# Patient Record
Sex: FEMALE | Race: WHITE | Employment: FULL TIME | URBAN - METROPOLITAN AREA
[De-identification: names, ages, dates, MRNs, and addresses within clinical notes are randomized per-mention and may not be internally consistent; named-entity substitution may affect disease eponyms.]

---

## 2018-09-21 ENCOUNTER — HOSPITAL ENCOUNTER (EMERGENCY)
Age: 48
Discharge: HOME | End: 2018-09-21
Attending: EMERGENCY MEDICINE
Payer: COMMERCIAL

## 2018-09-21 ENCOUNTER — APPOINTMENT (OUTPATIENT)
Dept: GENERAL RADIOLOGY | Age: 48
End: 2018-09-21
Payer: COMMERCIAL

## 2018-09-21 VITALS
RESPIRATION RATE: 12 BRPM | SYSTOLIC BLOOD PRESSURE: 136 MMHG | HEART RATE: 95 BPM | OXYGEN SATURATION: 100 % | HEIGHT: 62 IN | WEIGHT: 119.4 LBS | TEMPERATURE: 98.1 F | DIASTOLIC BLOOD PRESSURE: 88 MMHG | BODY MASS INDEX: 21.97 KG/M2

## 2018-09-21 DIAGNOSIS — N63.0 BREAST LUMP IN FEMALE: ICD-10-CM

## 2018-09-21 DIAGNOSIS — J20.9 ACUTE BRONCHITIS, UNSPECIFIED ORGANISM: Primary | ICD-10-CM

## 2018-09-21 PROCEDURE — 94640 AIRWAY INHALATION TREATMENT: CPT

## 2018-09-21 PROCEDURE — 71046 X-RAY EXAM CHEST 2 VIEWS: CPT

## 2018-09-21 PROCEDURE — 94664 DEMO&/EVAL PT USE INHALER: CPT

## 2018-09-21 PROCEDURE — 94761 N-INVAS EAR/PLS OXIMETRY MLT: CPT

## 2018-09-21 PROCEDURE — 99283 EMERGENCY DEPT VISIT LOW MDM: CPT

## 2018-09-21 PROCEDURE — 6370000000 HC RX 637 (ALT 250 FOR IP): Performed by: EMERGENCY MEDICINE

## 2018-09-21 RX ORDER — IPRATROPIUM BROMIDE AND ALBUTEROL SULFATE 2.5; .5 MG/3ML; MG/3ML
1 SOLUTION RESPIRATORY (INHALATION) ONCE
Status: COMPLETED | OUTPATIENT
Start: 2018-09-21 | End: 2018-09-21

## 2018-09-21 RX ORDER — AZITHROMYCIN 250 MG/1
TABLET, FILM COATED ORAL
Qty: 1 PACKET | Refills: 0 | Status: SHIPPED | OUTPATIENT
Start: 2018-09-21 | End: 2019-01-01 | Stop reason: ALTCHOICE

## 2018-09-21 RX ORDER — BENZONATATE 100 MG/1
100 CAPSULE ORAL 3 TIMES DAILY PRN
Qty: 30 CAPSULE | Refills: 0 | Status: SHIPPED | OUTPATIENT
Start: 2018-09-21 | End: 2018-09-28

## 2018-09-21 RX ORDER — PREDNISONE 20 MG/1
60 TABLET ORAL ONCE
Status: COMPLETED | OUTPATIENT
Start: 2018-09-21 | End: 2018-09-21

## 2018-09-21 RX ORDER — IPRATROPIUM BROMIDE AND ALBUTEROL SULFATE 2.5; .5 MG/3ML; MG/3ML
SOLUTION RESPIRATORY (INHALATION)
Status: DISCONTINUED
Start: 2018-09-21 | End: 2018-09-22 | Stop reason: HOSPADM

## 2018-09-21 RX ORDER — ALBUTEROL SULFATE 90 UG/1
2 AEROSOL, METERED RESPIRATORY (INHALATION) EVERY 4 HOURS PRN
Qty: 1 INHALER | Refills: 0 | Status: SHIPPED | OUTPATIENT
Start: 2018-09-21 | End: 2019-04-15

## 2018-09-21 RX ORDER — PREDNISONE 20 MG/1
60 TABLET ORAL DAILY
Qty: 12 TABLET | Refills: 0 | Status: SHIPPED | OUTPATIENT
Start: 2018-09-21 | End: 2019-01-01 | Stop reason: ALTCHOICE

## 2018-09-21 RX ADMIN — PREDNISONE 60 MG: 20 TABLET ORAL at 22:30

## 2018-09-21 RX ADMIN — IPRATROPIUM BROMIDE AND ALBUTEROL SULFATE 1 AMPULE: .5; 3 SOLUTION RESPIRATORY (INHALATION) at 22:12

## 2018-09-21 ASSESSMENT — PAIN SCALES - GENERAL: PAINLEVEL_OUTOF10: 3

## 2018-09-21 ASSESSMENT — PAIN DESCRIPTION - PAIN TYPE: TYPE: ACUTE PAIN

## 2018-09-21 ASSESSMENT — PAIN DESCRIPTION - ONSET: ONSET: ON-GOING

## 2018-09-21 ASSESSMENT — PAIN DESCRIPTION - LOCATION: LOCATION: CHEST

## 2018-09-21 ASSESSMENT — PAIN DESCRIPTION - FREQUENCY: FREQUENCY: INTERMITTENT

## 2018-09-21 ASSESSMENT — PAIN DESCRIPTION - DESCRIPTORS: DESCRIPTORS: BURNING;PRESSURE

## 2018-09-21 ASSESSMENT — PAIN DESCRIPTION - ORIENTATION: ORIENTATION: MID

## 2018-09-21 ASSESSMENT — PAIN DESCRIPTION - PROGRESSION: CLINICAL_PROGRESSION: NOT CHANGED

## 2018-09-22 NOTE — ED PROVIDER NOTES
[facility] Emergency Department    CHIEF COMPLAINT  Cough (pt states she started with what was her allergies, then it turned to a cough with sinus drainage of yellow cough, eyes are itching.)      HISTORY OF PRESENT ILLNESS  Francesco Reza is a 53 y.o. female presenting to the ER with predominant complaint of cough for the past 2 days. She says the cough is occasionally productive of yellow sputum. Patient feels slightly short of breath with coughing. Patient is a smoker. She also has some concerns about a lump on her right breast.  The lump is painful to touch No other complaints, modifying factors or associated symptoms. I have reviewed the following from the nursing documentation. Past Medical History:   Diagnosis Date    Hepatitis C without hepatic coma     Hx of blood clots     DEEP VEIN THROMBOSIS FROM IV DRUG USE    IV drug abuse     None for 5 mos    Methadone use [facility])      Past Surgical History:   Procedure Laterality Date    BREAST BIOPSY Right 2015    Excisional     SECTION      TUBAL LIGATION       History reviewed. No pertinent family history. Social History     Social History    Marital status: Single     Spouse name: N/A    Number of children: N/A    Years of education: N/A     Occupational History    Not on file. Social History Main Topics    Smoking status: Current Every Day Smoker     Packs/day: 1.00     Years: 20.00     Types: Cigarettes    Smokeless tobacco: Never Used    Alcohol use No    Drug use: No      Comment: pt states clean x 3 yrs.  Sexual activity: Not on file     Other Topics Concern    Not on file     Social History Narrative    No narrative on file     No current facility-administered medications for this encounter.       Current Outpatient Prescriptions   Medication Sig Dispense Refill    benzonatate (TESSALON PERLES) 100 MG capsule Take 1 capsule by mouth 3 times daily as needed for Cough 30 capsule 0    azithromycin

## 2018-09-22 NOTE — ED NOTES
Patient states she is worried about this lump she has noticed over the last few weeks in right axillary/breast region. Patient has a hx of benign tissue being removed in the past from the right breast. Will continue to monitor.       Neelima Graff RN  09/21/18 3536

## 2018-09-22 NOTE — ED TRIAGE NOTES
pt states she started with what was her allergies, then it turned to a cough with sinus drainage of yellow cough, eyes are itching.

## 2018-11-01 ENCOUNTER — APPOINTMENT (OUTPATIENT)
Dept: GENERAL RADIOLOGY | Age: 48
End: 2018-11-01
Payer: COMMERCIAL

## 2018-11-01 ENCOUNTER — HOSPITAL ENCOUNTER (EMERGENCY)
Age: 48
Discharge: HOME | End: 2018-11-01
Attending: EMERGENCY MEDICINE
Payer: COMMERCIAL

## 2018-11-01 ENCOUNTER — APPOINTMENT (OUTPATIENT)
Dept: CT IMAGING | Age: 48
End: 2018-11-01
Payer: COMMERCIAL

## 2018-11-01 VITALS
WEIGHT: 125.2 LBS | DIASTOLIC BLOOD PRESSURE: 100 MMHG | HEIGHT: 62 IN | HEART RATE: 76 BPM | OXYGEN SATURATION: 99 % | TEMPERATURE: 97.9 F | SYSTOLIC BLOOD PRESSURE: 135 MMHG | RESPIRATION RATE: 17 BRPM | BODY MASS INDEX: 23.04 KG/M2

## 2018-11-01 DIAGNOSIS — S20.219A CONTUSION OF CHEST WALL, UNSPECIFIED LATERALITY, INITIAL ENCOUNTER: ICD-10-CM
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
## 2018-11-01 DIAGNOSIS — S46.812A TRAPEZIUS STRAIN, LEFT, INITIAL ENCOUNTER: ICD-10-CM

## 2018-11-01 DIAGNOSIS — M54.2 NECK PAIN: ICD-10-CM

## 2018-11-01 PROCEDURE — 6370000000 HC RX 637 (ALT 250 FOR IP): Performed by: EMERGENCY MEDICINE

## 2018-11-01 PROCEDURE — 72125 CT NECK SPINE W/O DYE: CPT

## 2018-11-01 PROCEDURE — 99284 EMERGENCY DEPT VISIT MOD MDM: CPT

## 2018-11-01 PROCEDURE — 71046 X-RAY EXAM CHEST 2 VIEWS: CPT

## 2018-11-01 RX ORDER — IBUPROFEN 400 MG/1
800 TABLET ORAL ONCE
Status: COMPLETED | OUTPATIENT
Start: 2018-11-01 | End: 2018-11-01

## 2018-11-01 RX ADMIN — IBUPROFEN 800 MG: 400 TABLET, FILM COATED ORAL at 19:01

## 2018-11-01 ASSESSMENT — PAIN DESCRIPTION - LOCATION: LOCATION: BACK;NECK;CHEST

## 2018-11-01 ASSESSMENT — PAIN SCALES - GENERAL
PAINLEVEL_OUTOF10: 6
PAINLEVEL_OUTOF10: 5
PAINLEVEL_OUTOF10: 6

## 2018-11-01 ASSESSMENT — PAIN DESCRIPTION - DESCRIPTORS: DESCRIPTORS: SHARP

## 2018-11-01 ASSESSMENT — PAIN DESCRIPTION - FREQUENCY: FREQUENCY: CONTINUOUS

## 2018-11-01 ASSESSMENT — PAIN DESCRIPTION - PAIN TYPE: TYPE: ACUTE PAIN
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
## 2019-01-01 ENCOUNTER — APPOINTMENT (OUTPATIENT)
Dept: CT IMAGING | Age: 49
End: 2019-01-01
Payer: COMMERCIAL

## 2019-01-01 ENCOUNTER — APPOINTMENT (OUTPATIENT)
Dept: GENERAL RADIOLOGY | Age: 49
End: 2019-01-01
Payer: COMMERCIAL

## 2019-01-01 ENCOUNTER — HOSPITAL ENCOUNTER (EMERGENCY)
Age: 49
Discharge: HOME | End: 2019-01-02
Attending: EMERGENCY MEDICINE
Payer: COMMERCIAL

## 2019-01-01 DIAGNOSIS — R10.32 LEFT LOWER QUADRANT PAIN: Primary | ICD-10-CM

## 2019-01-01 DIAGNOSIS — N83.202 CYST OF LEFT OVARY: ICD-10-CM

## 2019-01-01 LAB
BILIRUBIN URINE: NEGATIVE
BLOOD, URINE: NEGATIVE
CLARITY: CLEAR
COLOR: YELLOW
GLUCOSE URINE: NEGATIVE MG/DL
HCG(URINE) PREGNANCY TEST: NEGATIVE
KETONES, URINE: 15 MG/DL
LEUKOCYTE ESTERASE, URINE: NEGATIVE
MICROSCOPIC EXAMINATION: ABNORMAL
NITRITE, URINE: NEGATIVE
PH UA: 5.5
PROTEIN UA: NEGATIVE MG/DL
SPECIFIC GRAVITY UA: >=1.03
URINE REFLEX TO CULTURE: ABNORMAL
URINE TYPE: ABNORMAL
UROBILINOGEN, URINE: 0.2 E.U./DL

## 2019-01-01 PROCEDURE — 99284 EMERGENCY DEPT VISIT MOD MDM: CPT

## 2019-01-01 PROCEDURE — 96372 THER/PROPH/DIAG INJ SC/IM: CPT

## 2019-01-01 PROCEDURE — 74176 CT ABD & PELVIS W/O CONTRAST: CPT

## 2019-01-01 PROCEDURE — 84703 CHORIONIC GONADOTROPIN ASSAY: CPT

## 2019-01-01 PROCEDURE — 71046 X-RAY EXAM CHEST 2 VIEWS: CPT

## 2019-01-01 PROCEDURE — 81003 URINALYSIS AUTO W/O SCOPE: CPT

## 2019-01-01 PROCEDURE — 6360000002 HC RX W HCPCS: Performed by: EMERGENCY MEDICINE

## 2019-01-01 RX ORDER — KETOROLAC TROMETHAMINE 30 MG/ML
30 INJECTION, SOLUTION INTRAMUSCULAR; INTRAVENOUS ONCE
Status: COMPLETED | OUTPATIENT
Start: 2019-01-01 | End: 2019-01-01

## 2019-01-01 RX ORDER — NAPROXEN 500 MG/1
500 TABLET ORAL 2 TIMES DAILY PRN
Qty: 20 TABLET | Refills: 0 | Status: SHIPPED | OUTPATIENT
Start: 2019-01-01 | End: 2019-01-11

## 2019-01-01 RX ADMIN — KETOROLAC TROMETHAMINE 30 MG: 30 INJECTION, SOLUTION INTRAMUSCULAR at 22:17

## 2019-01-01 ASSESSMENT — PAIN DESCRIPTION - PAIN TYPE
TYPE: ACUTE PAIN

## 2019-01-01 ASSESSMENT — PAIN SCALES - GENERAL
PAINLEVEL_OUTOF10: 9
PAINLEVEL_OUTOF10: 9
PAINLEVEL_OUTOF10: 7
PAINLEVEL_OUTOF10: 7

## 2019-01-01 ASSESSMENT — PAIN DESCRIPTION - LOCATION
LOCATION: ABDOMEN

## 2019-01-01 ASSESSMENT — PAIN DESCRIPTION - ORIENTATION
ORIENTATION: LEFT
ORIENTATION: LEFT;LOWER
ORIENTATION: LEFT

## 2019-01-01 ASSESSMENT — PAIN DESCRIPTION - FREQUENCY: FREQUENCY: CONTINUOUS

## 2019-01-01 ASSESSMENT — PAIN DESCRIPTION - DESCRIPTORS
DESCRIPTORS: SHARP
DESCRIPTORS: SHARP

## 2019-01-02 VITALS
HEART RATE: 78 BPM | DIASTOLIC BLOOD PRESSURE: 70 MMHG | SYSTOLIC BLOOD PRESSURE: 128 MMHG | RESPIRATION RATE: 16 BRPM | WEIGHT: 123.2 LBS | HEIGHT: 62 IN | TEMPERATURE: 98.2 F | BODY MASS INDEX: 22.67 KG/M2 | OXYGEN SATURATION: 98 %

## 2019-01-02 ASSESSMENT — PAIN DESCRIPTION - LOCATION: LOCATION: ABDOMEN

## 2019-01-02 ASSESSMENT — PAIN - FUNCTIONAL ASSESSMENT: PAIN_FUNCTIONAL_ASSESSMENT: 0-10

## 2019-01-02 ASSESSMENT — PAIN DESCRIPTION - FREQUENCY: FREQUENCY: CONTINUOUS

## 2019-01-02 ASSESSMENT — PAIN SCALES - GENERAL: PAINLEVEL_OUTOF10: 8

## 2019-01-02 ASSESSMENT — PAIN DESCRIPTION - DESCRIPTORS: DESCRIPTORS: ACHING

## 2019-01-02 ASSESSMENT — PAIN DESCRIPTION - PAIN TYPE: TYPE: ACUTE PAIN

## 2019-01-02 ASSESSMENT — PAIN DESCRIPTION - ORIENTATION: ORIENTATION: LEFT;LOWER

## 2019-04-15 ENCOUNTER — HOSPITAL ENCOUNTER (EMERGENCY)
Age: 49
Discharge: HOME | End: 2019-04-15
Attending: EMERGENCY MEDICINE
Payer: COMMERCIAL

## 2019-04-15 VITALS
RESPIRATION RATE: 17 BRPM | DIASTOLIC BLOOD PRESSURE: 93 MMHG | SYSTOLIC BLOOD PRESSURE: 120 MMHG | BODY MASS INDEX: 22.41 KG/M2 | TEMPERATURE: 98 F | HEIGHT: 62 IN | HEART RATE: 109 BPM | WEIGHT: 121.8 LBS | OXYGEN SATURATION: 98 %

## 2019-04-15 DIAGNOSIS — J40 BRONCHITIS: Primary | ICD-10-CM

## 2019-04-15 DIAGNOSIS — R03.0 ELEVATED BLOOD PRESSURE READING: ICD-10-CM

## 2019-04-15 PROCEDURE — 99283 EMERGENCY DEPT VISIT LOW MDM: CPT

## 2019-04-15 RX ORDER — PREDNISONE 20 MG/1
40 TABLET ORAL DAILY
Qty: 14 TABLET | Refills: 0 | Status: SHIPPED | OUTPATIENT
Start: 2019-04-15 | End: 2019-04-22

## 2019-04-15 RX ORDER — ALBUTEROL SULFATE 90 UG/1
2 AEROSOL, METERED RESPIRATORY (INHALATION) 4 TIMES DAILY PRN
Qty: 1 INHALER | Refills: 0 | Status: SHIPPED | OUTPATIENT
Start: 2019-04-15 | End: 2020-07-13

## 2019-04-15 RX ORDER — DOXYCYCLINE HYCLATE 100 MG
100 TABLET ORAL 2 TIMES DAILY
Qty: 14 TABLET | Refills: 0 | Status: SHIPPED | OUTPATIENT
Start: 2019-04-15 | End: 2019-04-22

## 2019-04-15 ASSESSMENT — PAIN SCALES - GENERAL: PAINLEVEL_OUTOF10: 6

## 2019-04-15 ASSESSMENT — PAIN DESCRIPTION - DESCRIPTORS: DESCRIPTORS: SQUEEZING

## 2019-04-15 ASSESSMENT — PAIN DESCRIPTION - PAIN TYPE: TYPE: ACUTE PAIN

## 2019-04-15 ASSESSMENT — PAIN DESCRIPTION - FREQUENCY: FREQUENCY: CONTINUOUS

## 2019-04-15 ASSESSMENT — PAIN DESCRIPTION - LOCATION: LOCATION: RIB CAGE

## 2019-04-15 NOTE — ED NOTES
Patient verbalized understanding of d/c instructions. Patient given scripts x 3. Patient left the ED and instructed on follow up.         Prince Mary Jo RN  04/15/19 9339

## 2019-04-15 NOTE — ED PROVIDER NOTES
Attends meetings of clubs or organizations: Not on file     Relationship status: Not on file    Intimate partner violence:     Fear of current or ex partner: Not on file     Emotionally abused: Not on file     Physically abused: Not on file     Forced sexual activity: Not on file   Other Topics Concern    Not on file   Social History Narrative    Not on file     No current facility-administered medications for this encounter. Current Outpatient Medications   Medication Sig Dispense Refill    doxycycline hyclate (VIBRA-TABS) 100 MG tablet Take 1 tablet by mouth 2 times daily for 7 days 14 tablet 0    albuterol sulfate  (90 Base) MCG/ACT inhaler Inhale 2 puffs into the lungs 4 times daily as needed for Wheezing 1 Inhaler 0    predniSONE (DELTASONE) 20 MG tablet Take 2 tablets by mouth daily for 7 days 14 tablet 0    naproxen (NAPROSYN) 500 MG tablet Take 1 tablet by mouth 2 times daily as needed for Pain 20 tablet 0     Allergies   Allergen Reactions    Pcn [Penicillins] Hives          PHYSICAL EXAM  BP (!) 120/93   Pulse 109   Temp 98 °F (36.7 °C) (Oral)   Resp 17   Ht 5' 2\" (1.575 m)   Wt 55.2 kg (121 lb 12.8 oz)   LMP 04/01/2019   SpO2 98%   BMI 22.28 kg/m²   Physical Exam   GENERAL APPEARANCE: Awake and alert. Cooperative. In no acute distress. EYES: PERRL. Corneas clear. Sclera non icteric. No conjunctival injection  ENT: Oropharynx clear. Airway patent. No stridor. No asymmetry. NECK: Supple  LUNGS: Clear. Equal breath sounds bilaterally. CARDIOVASCULAR: RRR. No murmurs rubs or gallops. ABDOMEN: Soft non tender. No guarding or rebound. EXTREMITIES:  Moves all extremities equally. SKIN: Warm and dry. NEURO: Alert and oriented x3. Strength 5/5 throughout.          LABORATORY STUDIES:   Labs Reviewed - No data to display     RADIOLOGY  No orders to display       If EKG done, EKG was interpreted independently by me    PROCEDURES  Procedures    EDCOURSE/MDM  Patient seen and evaluated. Old records selectively reviewed if pertinent. Labs and imaging reviewed and results discussed with patient. Patient has been treated in the past with bronchodilators and antibiotics for similar symptoms with relief. I considered influenza-like illness, reactive airway disease, upper respiratory infection, including sinusitis, bronchitis. Low likelihood for pneumonia    If Sandra Singh is discharged, I discussed with Sandra Singh and/or family the exam results, diagnosis, care, prognosis, reasons to return and the importance of follow up. Patient and/or family is in agreement with plan and all questions have been answered. Specific discharge instructions explained, including reasons to return to the emergency department. If discharged, patient was given scripts for the following medications. Discharge Medication List as of 4/15/2019  3:07 PM      START taking these medications    Details   doxycycline hyclate (VIBRA-TABS) 100 MG tablet Take 1 tablet by mouth 2 times daily for 7 days, Disp-14 tablet, R-0Print      predniSONE (DELTASONE) 20 MG tablet Take 2 tablets by mouth daily for 7 days, Disp-14 tablet, R-0Print             CLINICAL IMPRESSION  1. Bronchitis    2. Elevated blood pressure reading        BP (!) 120/93   Pulse 109   Temp 98 °F (36.7 °C) (Oral)   Resp 17   Ht 5' 2\" (1.575 m)   Wt 55.2 kg (121 lb 12.8 oz)   LMP 04/01/2019   SpO2 98%   BMI 22.28 kg/m²     DISPOSITION  Sandra Singh was discharged in stable condition.                    Giovanny Steve MD  04/18/19 Evelyn Weller [id number], MD  04/18/19 [id number]

## 2019-11-15 ENCOUNTER — HOSPITAL ENCOUNTER (EMERGENCY)
Age: 49
Discharge: HOME | End: 2019-11-16
Attending: EMERGENCY MEDICINE

## 2019-11-15 DIAGNOSIS — R11.2 NON-INTRACTABLE VOMITING WITH NAUSEA, UNSPECIFIED VOMITING TYPE: ICD-10-CM

## 2019-11-15 DIAGNOSIS — R10.12 ABDOMINAL PAIN, LEFT UPPER QUADRANT: Primary | ICD-10-CM

## 2019-11-15 PROCEDURE — 99284 EMERGENCY DEPT VISIT MOD MDM: CPT

## 2019-11-15 RX ORDER — ONDANSETRON 2 MG/ML
4 INJECTION INTRAMUSCULAR; INTRAVENOUS
Status: DISCONTINUED | OUTPATIENT
Start: 2019-11-15 | End: 2019-11-16 | Stop reason: HOSPADM

## 2019-11-15 RX ORDER — 0.9 % SODIUM CHLORIDE 0.9 %
1000 INTRAVENOUS SOLUTION INTRAVENOUS ONCE
Status: COMPLETED | OUTPATIENT
Start: 2019-11-15 | End: 2019-11-16

## 2019-11-15 ASSESSMENT — PAIN DESCRIPTION - DESCRIPTORS: DESCRIPTORS: SHARP;CONSTANT

## 2019-11-15 ASSESSMENT — PAIN SCALES - GENERAL: PAINLEVEL_OUTOF10: 7

## 2019-11-15 ASSESSMENT — PAIN DESCRIPTION - LOCATION: LOCATION: BACK;ABDOMEN

## 2019-11-15 ASSESSMENT — PAIN DESCRIPTION - FREQUENCY: FREQUENCY: CONTINUOUS

## 2019-11-15 ASSESSMENT — PAIN DESCRIPTION - PAIN TYPE: TYPE: ACUTE PAIN

## 2019-11-16 VITALS
WEIGHT: 128.31 LBS | RESPIRATION RATE: 16 BRPM | SYSTOLIC BLOOD PRESSURE: 135 MMHG | BODY MASS INDEX: 23.61 KG/M2 | HEART RATE: 92 BPM | DIASTOLIC BLOOD PRESSURE: 78 MMHG | HEIGHT: 62 IN | OXYGEN SATURATION: 98 % | TEMPERATURE: 98.1 F

## 2019-11-16 LAB
ALBUMIN SERPL-MCNC: 3.8 G/DL (ref 3.4–5)
ALP BLD-CCNC: 82 U/L (ref 40–129)
ALT SERPL-CCNC: 20 U/L (ref 10–40)
ANION GAP SERPL CALCULATED.3IONS-SCNC: 15 MMOL/L (ref 3–16)
AST SERPL-CCNC: 33 U/L (ref 15–37)
BASOPHILS ABSOLUTE: 0.1 K/UL (ref 0–0.2)
BASOPHILS RELATIVE PERCENT: 0.7 %
BILIRUB SERPL-MCNC: 0.8 MG/DL (ref 0–1)
BILIRUBIN DIRECT: 0.3 MG/DL (ref 0–0.3)
BILIRUBIN, INDIRECT: 0.5 MG/DL (ref 0–1)
BUN BLDV-MCNC: 10 MG/DL (ref 7–20)
CALCIUM SERPL-MCNC: 9.2 MG/DL (ref 8.3–10.6)
CHLORIDE BLD-SCNC: 92 MMOL/L (ref 99–110)
CO2: 24 MMOL/L (ref 21–32)
CREAT SERPL-MCNC: 1.1 MG/DL (ref 0.6–1.1)
EOSINOPHILS ABSOLUTE: 0 K/UL (ref 0–0.6)
EOSINOPHILS RELATIVE PERCENT: 0.3 %
GFR AFRICAN AMERICAN: >60
GFR NON-AFRICAN AMERICAN: 53
GLUCOSE BLD-MCNC: 123 MG/DL (ref 70–99)
HCT VFR BLD CALC: 40.1 % (ref 36–48)
HEMOGLOBIN: 14.1 G/DL (ref 12–16)
LIPASE: 39 U/L (ref 13–60)
LYMPHOCYTES ABSOLUTE: 4.4 K/UL (ref 1–5.1)
LYMPHOCYTES RELATIVE PERCENT: 42.8 %
MCH RBC QN AUTO: 31.8 PG (ref 26–34)
MCHC RBC AUTO-ENTMCNC: 35.2 G/DL (ref 31–36)
MCV RBC AUTO: 90.3 FL (ref 80–100)
MONOCYTES ABSOLUTE: 0.8 K/UL (ref 0–1.3)
MONOCYTES RELATIVE PERCENT: 7.9 %
NEUTROPHILS ABSOLUTE: 5 K/UL (ref 1.7–7.7)
NEUTROPHILS RELATIVE PERCENT: 48.3 %
PDW BLD-RTO: 12.4 % (ref 12.4–15.4)
PLATELET # BLD: 273 K/UL (ref 135–450)
PMV BLD AUTO: 8.5 FL (ref 5–10.5)
POTASSIUM REFLEX MAGNESIUM: 3.7 MMOL/L (ref 3.5–5.1)
RBC # BLD: 4.45 M/UL (ref 4–5.2)
SODIUM BLD-SCNC: 131 MMOL/L (ref 136–145)
TOTAL PROTEIN: 7.4 G/DL (ref 6.4–8.2)
WBC # BLD: 10.3 K/UL (ref 4–11)

## 2019-11-16 PROCEDURE — 80076 HEPATIC FUNCTION PANEL: CPT

## 2019-11-16 PROCEDURE — 2580000003 HC RX 258: Performed by: EMERGENCY MEDICINE

## 2019-11-16 PROCEDURE — 80048 BASIC METABOLIC PNL TOTAL CA: CPT

## 2019-11-16 PROCEDURE — 2500000003 HC RX 250 WO HCPCS: Performed by: EMERGENCY MEDICINE

## 2019-11-16 PROCEDURE — 83690 ASSAY OF LIPASE: CPT

## 2019-11-16 PROCEDURE — 96375 TX/PRO/DX INJ NEW DRUG ADDON: CPT

## 2019-11-16 PROCEDURE — 96374 THER/PROPH/DIAG INJ IV PUSH: CPT

## 2019-11-16 PROCEDURE — 96361 HYDRATE IV INFUSION ADD-ON: CPT

## 2019-11-16 PROCEDURE — 85025 COMPLETE CBC W/AUTO DIFF WBC: CPT

## 2019-11-16 PROCEDURE — 6360000002 HC RX W HCPCS: Performed by: EMERGENCY MEDICINE

## 2019-11-16 RX ORDER — ONDANSETRON 4 MG/1
4 TABLET, ORALLY DISINTEGRATING ORAL EVERY 8 HOURS PRN
Qty: 20 TABLET | Refills: 0 | Status: SHIPPED | OUTPATIENT
Start: 2019-11-16 | End: 2020-07-13

## 2019-11-16 RX ORDER — FAMOTIDINE 20 MG/1
20 TABLET, FILM COATED ORAL 2 TIMES DAILY
Qty: 30 TABLET | Refills: 1 | Status: SHIPPED | OUTPATIENT
Start: 2019-11-16 | End: 2020-07-13

## 2019-11-16 RX ADMIN — ONDANSETRON 4 MG: 2 INJECTION INTRAMUSCULAR; INTRAVENOUS at 00:45

## 2019-11-16 RX ADMIN — FAMOTIDINE 20 MG: 10 INJECTION, SOLUTION INTRAVENOUS at 00:45

## 2019-11-16 RX ADMIN — SODIUM CHLORIDE 1000 ML: 9 INJECTION, SOLUTION INTRAVENOUS at 00:45

## 2020-07-13 ENCOUNTER — HOSPITAL ENCOUNTER (EMERGENCY)
Age: 50
Discharge: HOME | End: 2020-07-13
Attending: EMERGENCY MEDICINE

## 2020-07-13 VITALS
SYSTOLIC BLOOD PRESSURE: 149 MMHG | RESPIRATION RATE: 22 BRPM | HEART RATE: 95 BPM | TEMPERATURE: 97.8 F | OXYGEN SATURATION: 99 % | WEIGHT: 118.4 LBS | BODY MASS INDEX: 21.79 KG/M2 | HEIGHT: 62 IN | DIASTOLIC BLOOD PRESSURE: 91 MMHG

## 2020-07-13 PROCEDURE — 99282 EMERGENCY DEPT VISIT SF MDM: CPT

## 2020-07-13 PROCEDURE — 90715 TDAP VACCINE 7 YRS/> IM: CPT | Performed by: EMERGENCY MEDICINE

## 2020-07-13 PROCEDURE — 90471 IMMUNIZATION ADMIN: CPT | Performed by: EMERGENCY MEDICINE

## 2020-07-13 PROCEDURE — 2500000003 HC RX 250 WO HCPCS: Performed by: EMERGENCY MEDICINE

## 2020-07-13 PROCEDURE — 6370000000 HC RX 637 (ALT 250 FOR IP): Performed by: EMERGENCY MEDICINE

## 2020-07-13 PROCEDURE — 6360000002 HC RX W HCPCS: Performed by: EMERGENCY MEDICINE

## 2020-07-13 RX ORDER — LIDOCAINE HYDROCHLORIDE AND EPINEPHRINE 10; 10 MG/ML; UG/ML
20 INJECTION, SOLUTION INFILTRATION; PERINEURAL ONCE
Status: COMPLETED | OUTPATIENT
Start: 2020-07-13 | End: 2020-07-13

## 2020-07-13 RX ADMIN — LIDOCAINE HYDROCHLORIDE,EPINEPHRINE BITARTRATE 20 ML: 10; .01 INJECTION, SOLUTION INFILTRATION; PERINEURAL at 16:34

## 2020-07-13 RX ADMIN — TETANUS TOXOID, REDUCED DIPHTHERIA TOXOID AND ACELLULAR PERTUSSIS VACCINE, ADSORBED 0.5 ML: 5; 2.5; 8; 8; 2.5 SUSPENSION INTRAMUSCULAR at 16:43

## 2020-07-13 RX ADMIN — GELATIN ABSORBABLE SPONGE 12-7 MM 1 EACH: 12-7 MISC at 16:20

## 2020-07-13 ASSESSMENT — PAIN SCALES - GENERAL
PAINLEVEL_OUTOF10: 10
PAINLEVEL_OUTOF10: 10

## 2020-07-13 ASSESSMENT — PAIN DESCRIPTION - PAIN TYPE: TYPE: ACUTE PAIN

## 2020-07-13 ASSESSMENT — PAIN DESCRIPTION - DESCRIPTORS: DESCRIPTORS: PATIENT UNABLE TO DESCRIBE

## 2020-07-13 ASSESSMENT — PAIN DESCRIPTION - ORIENTATION: ORIENTATION: LEFT

## 2020-07-13 ASSESSMENT — PAIN DESCRIPTION - LOCATION: LOCATION: HAND

## 2020-07-13 NOTE — ED NOTES
Reviewed d/c instructions with pt. Patient discharged home with scripts  X2 . Gait steady. No complications.      Earline Araya RN  07/13/20 0556

## 2020-07-13 NOTE — ED PROVIDER NOTES
EMERGENCY DEPARTMENT PHYSICIAN DOCUMENTATION      CHIEF COMPLAINT  LACERATION  Patient information was obtained from patient. History/Exam limitations: none. HISTORY OF PRESENT ILLNESS  Judah De Leon is a 53 y.o. female with complaint of LACERATION. Injured area L 2nd digit  Occurred just pta  Mechanism sharp knife. Pain is sharp, worse with touching and moving, no sig radiation. Tdap is unkown    REVIEW OF SYSTEMS  A full 10 point Review of Systems was performed and is negative aside from pertinent positives mentioned in HPI    ALLERGIES:  Allergies   Allergen Reactions    Pcn [Penicillins] Hives       PAST HISTORY  Past Medical History:   Diagnosis Date    Hepatitis C without hepatic coma     Hx of blood clots 2007    DEEP VEIN THROMBOSIS FROM IV DRUG USE    IV drug abuse ([facility] [address]     None for 5 mos    Methadone use ([facility] [address]        History reviewed. No pertinent family history. No current facility-administered medications on file prior to encounter. Current Outpatient Medications on File Prior to Encounter   Medication Sig Dispense Refill    naproxen (NAPROSYN) 500 MG tablet Take 1 tablet by mouth 2 times daily as needed for Pain 20 tablet 0       Social History     Tobacco Use    Smoking status: Current Every Day Smoker     Packs/day: 1.00     Years: 20.00     Pack years: 20.00     Types: Cigarettes    Smokeless tobacco: Never Used   Substance Use Topics    Alcohol use: Yes    Drug use: No     Comment: pt states clean x 3 yrs.          EXAM:   Presentation Vital Signs: BP (!) 149/91   Pulse 95   Temp 97.8 °F (36.6 °C) (Oral)   Resp 22   Ht 5' 2\" (1.575 m)   Wt 118 lb 6.4 oz (53.7 kg)   SpO2 99%   BMI 21.66 kg/m²     General: Well nourished, no acute distress  Head: No traumatic injury  ENT: MMM, no facial asymmetry, no nasal discharge  Eyes: EOM  Lungs: No respiratory distress  Skin: L distal phalanx avulsion approx 2-3mm on tip/volar surface, bleeding  Extremities: Normal ROM of bilateral upper extremities at shoulders, elbows, wrists; normal ROM of bilateral LE at hips and knees. Neurologic: Alert, oriented x 3. No focal deficits upon moving arms and legs  Psychiatric: Appropriate demeanor without agitation or internal stimulation      MEDICAL DECISION MAKING  Pt with clean avulsion of L finger tip, relatively superficial and down to dermis on exam but no bone protruding. Not involving nail, more on tip/volar surface than dorsal.  Injected wound with lidocaine with epi to help control bleeding, irrigated with saline by myself, and gelfoam material applied with dressing. Will observe for hemostasis and proceed from there. Admin tdap as well. 451pm: hemostatic, dc home    DISPOSITION  Home    IMPRESSION:  L 2nd digit tip avulsion    This medical chart used with aid of transcription software. As such, there may be inadvertent errors in transcription of spellings and words despite physician's attempts to correct all possible errors.          Jessica Martinez MD  07/13/20 6284

## 2022-10-29 ENCOUNTER — HOSPITAL ENCOUNTER (EMERGENCY)
Age: 52
Discharge: LEFT BEFORE DISCHARGE | End: 2022-10-29
Attending: EMERGENCY MEDICINE
Payer: COMMERCIAL

## 2022-10-29 ENCOUNTER — APPOINTMENT (OUTPATIENT)
Dept: CT IMAGING | Age: 52
End: 2022-10-29
Payer: COMMERCIAL

## 2022-10-29 VITALS
WEIGHT: 113.54 LBS | OXYGEN SATURATION: 99 % | HEIGHT: 62 IN | TEMPERATURE: 98.1 F | BODY MASS INDEX: 20.89 KG/M2 | DIASTOLIC BLOOD PRESSURE: 97 MMHG | SYSTOLIC BLOOD PRESSURE: 161 MMHG | HEART RATE: 88 BPM | RESPIRATION RATE: 18 BRPM

## 2022-10-29 DIAGNOSIS — L02.91 ABSCESS: Primary | ICD-10-CM

## 2022-10-29 DIAGNOSIS — F19.90 IV DRUG USER: ICD-10-CM

## 2022-10-29 PROCEDURE — 99281 EMR DPT VST MAYX REQ PHY/QHP: CPT

## 2022-10-29 RX ORDER — KETOROLAC TROMETHAMINE 30 MG/ML
15 INJECTION, SOLUTION INTRAMUSCULAR; INTRAVENOUS ONCE
Status: DISCONTINUED | OUTPATIENT
Start: 2022-10-29 | End: 2022-10-29 | Stop reason: HOSPADM

## 2022-10-29 ASSESSMENT — LIFESTYLE VARIABLES: HOW OFTEN DO YOU HAVE A DRINK CONTAINING ALCOHOL: NEVER

## 2022-10-29 ASSESSMENT — PAIN DESCRIPTION - FREQUENCY: FREQUENCY: CONTINUOUS

## 2022-10-29 ASSESSMENT — PAIN DESCRIPTION - LOCATION: LOCATION: GROIN

## 2022-10-29 ASSESSMENT — ENCOUNTER SYMPTOMS
ABDOMINAL PAIN: 0
COUGH: 0
TROUBLE SWALLOWING: 0
PHOTOPHOBIA: 0
SHORTNESS OF BREATH: 0
COLOR CHANGE: 1
VOMITING: 0

## 2022-10-29 ASSESSMENT — PAIN SCALES - GENERAL: PAINLEVEL_OUTOF10: 9

## 2022-10-29 ASSESSMENT — PAIN DESCRIPTION - DESCRIPTORS: DESCRIPTORS: THROBBING

## 2022-10-29 ASSESSMENT — PAIN - FUNCTIONAL ASSESSMENT: PAIN_FUNCTIONAL_ASSESSMENT: 0-10

## 2022-10-29 ASSESSMENT — PAIN DESCRIPTION - ORIENTATION: ORIENTATION: LEFT

## 2022-10-29 NOTE — ED PROVIDER NOTES
[facility]  EMERGENCY DEPARTMENTENCOUNTER      Pt Name: Abdulaziz Marie  MRN: [id number]  [place] 1970  Date ofevaluation: 10/29/2022  Provider: Kaila Nolasco MD    CHIEF COMPLAINT       Chief Complaint   Patient presents with    Abscess     To left groin         HISTORY OF PRESENT ILLNESS   (Location/Symptom, Timing/Onset,Context/Setting, Quality, Duration, Modifying Factors, Severity)  Note limiting factors. Abdulaziz Marie is a 47 y.o. female  who  has a past medical history of Hepatitis C without hepatic coma, Hx of blood clots, IV drug abuse ([facility] [address] and Methadone use. who presents to the emergency department for pain and swelling to the left inguinal region. Patient reports that she intermittently uses IV drugs and has been injecting into her left groin region. States that she uses the area infrequently but there is noted thickening of the skin in a dimpled area. She states that she been having some drainage and worsening pain and swelling to the area. States that she only injects cocaine. Denies chest pains or shortness of breath. States that she does have a friend visiting from [facility] and has been using more frequently, and that she wants to go to a concert that will be in [place] in the near future. HPI    NursingNotes were reviewed. REVIEW OF SYSTEMS    (2-9 systems for level 4, 10 or more for level 5)     Review of Systems   Constitutional:  Negative for activity change, fatigue and fever. HENT:  Negative for congestion, mouth sores and trouble swallowing. Eyes:  Negative for photophobia and visual disturbance. Respiratory:  Negative for cough and shortness of breath. Cardiovascular:  Negative for chest pain and palpitations. Gastrointestinal:  Negative for abdominal pain and vomiting. Genitourinary:  Negative for difficulty urinating and frequency. Musculoskeletal:  Negative for gait problem and neck pain.    Skin:  Positive for color change and wound. Negative for rash. Neurological:  Negative for dizziness, light-headedness and headaches. Psychiatric/Behavioral:  Negative for confusion. The patient is not nervous/anxious. All other systems reviewed and are negative. Except as noted above the remainder of the review of systems was reviewed and negative. PAST MEDICAL HISTORY     Past Medical History:   Diagnosis Date    Hepatitis C without hepatic coma     Hx of blood clots     DEEP VEIN THROMBOSIS FROM IV DRUG USE    IV drug abuse ([facility] [address]     None for 5 mos    Methadone use          SURGICALHISTORY       Past Surgical History:   Procedure Laterality Date    BREAST BIOPSY Right 2015    Excisional     SECTION      TUBAL LIGATION           CURRENT MEDICATIONS       Previous Medications    NAPROXEN (NAPROSYN) 500 MG TABLET    Take 1 tablet by mouth 2 times daily as needed for Pain            Pcn [penicillins]    FAMILY HISTORY     History reviewed. No pertinent family history.        SOCIAL HISTORY       Social History     Socioeconomic History    Marital status: Single     Spouse name: None    Number of children: None    Years of education: None    Highest education level: None   Tobacco Use    Smoking status: Every Day     Packs/day: 1.00     Years: 20.00     Pack years: 20.00     Types: Cigarettes    Smokeless tobacco: Never   Substance and Sexual Activity    Alcohol use: Yes    Drug use: Yes     Frequency: 1.0 times per week     Types: IV, Cocaine    Sexual activity: Yes     Partners: Male       SCREENINGS    [place] Coma Scale  Eye Opening: Spontaneous  Best Verbal Response: Oriented  Best Motor Response: Obeys commands  [place] Coma Scale Score: 15        PHYSICAL EXAM    (up to 7 for level 4, 8 or more for level 5)     ED Triage Vitals [10/29/22 0321]   BP Temp Temp Source Heart Rate Resp SpO2 Height Weight   (!) 160/103 98.1 °F (36.7 °C) Oral 91 16 99 % 5' 2\" (1.575 m) 113 lb 8.6 oz (51.5 kg)       Physical Exam  Vitals reviewed. Constitutional:       Appearance: She is well-developed. HENT:      Head: Normocephalic and atraumatic. Mouth/Throat:      Mouth: Mucous membranes are moist.   Eyes:      Extraocular Movements: Extraocular movements intact. Conjunctiva/sclera: Conjunctivae normal.      Pupils: Pupils are equal, round, and reactive to light. Neck:      Trachea: No tracheal deviation. Cardiovascular:      Rate and Rhythm: Normal rate and regular rhythm. Heart sounds: Normal heart sounds. Pulmonary:      Effort: Pulmonary effort is normal.      Breath sounds: Normal breath sounds. Abdominal:      General: There is no distension. Palpations: Abdomen is soft. Tenderness: There is no abdominal tenderness. Musculoskeletal:         General: Swelling and tenderness present. Normal range of motion. Cervical back: Normal range of motion. Skin:     General: Skin is warm and dry. Findings: Erythema present. Neurological:      Mental Status: She is alert. RESULTS     EKG: All EKG's are interpreted by the Emergency Department Physician who either signs or Co-signsthis chart in the absence of a cardiologist.        RADIOLOGY:   Kaa[employer] Camera such as CT, Ultrasound and MRI are read by the radiologist. Plain radiographic images are visualized and preliminarily interpreted by the emergency physician with the below findings:      Interpretation per the Radiologist below, if available at the time ofthis note:    CT ABDOMEN PELVIS W IV CONTRAST Additional Contrast? None    (Results Pending)         ED BEDSIDE ULTRASOUND:   Performed by ED Physician - none    LABS:  Labs Reviewed   CULTURE, BLOOD 1   CBC WITH AUTO DIFFERENTIAL   COMPREHENSIVE METABOLIC PANEL W/ REFLEX TO MG FOR LOW K   LIPASE       All other labs were within normal range or not returned as of this dictation.     EMERGENCY DEPARTMENT COURSE and DIFFERENTIAL DIAGNOSIS/MDM:   Vitals:    Vitals: 10/29/22 0321 10/29/22 0430 10/29/22 0500   BP: (!) 160/103 (!) 140/94 (!) 161/97   Pulse: 91 86 88   Resp: 16 16 18   Temp: 98.1 °F (36.7 °C)     TempSrc: Oral     SpO2: 99% 100% 99%   Weight: 113 lb 8.6 oz (51.5 kg)     Height: 5' 2\" (1.575 m)         Patient was given thefollowing medications:  Medications   ketorolac (TORADOL) injection 15 mg (has no administration in time range)   iopamidol (ISOVUE-370) 76 % injection 75 mL (has no administration in time range)       ED COURSE & MEDICAL DECISION MAKING    Pertinent Labs & Imaging studies reviewed. (See chart for details)   -  Patient seen and evaluated in the emergency department. -  Triage and nursing notes reviewed and incorporated. -  Old chart records reviewed and incorporated. -  Differential diagnosis includes: Differential diagnosis: necrotizing fasciitis, deep space soft tissue bacterial skin infection, viral rash, systemic infectious rash, aseptic cyst, malignancy, other    -  Work-up included:  See above  -  ED treatment included: See above  -  Results discussed with patient. Old female with a history of IV drug use presents to the ED for evaluation of pain and swelling to the left inguinal region. Reports that she has been injecting in the area for prolonged time. There appear to be calluses of the skin and there is an area of indentation which looks like it that he is chronically. Area surrounding is indurated and fluctuant concerning for abscess which may involve the vasculature. Patient informed that this require IV work-up and imaging studies and likely admission to the hospital for IV antibiotics as she appears to be injecting into the central venous system. Very somnolent and states that she is unsure if she wants to stay in the hospital.  She states that she has a friend visiting and they and she is planning on going to a concert. Patient strongly advised that she should remain in the hospital for definitive care management.   Patient states that she wanted to talk to her friend before deciding whether or not she wanted undergo work-up with for evaluation potential admission. Patient initially elected to have work-up performed. Prior to nursing staff being able to establish IV access and draw blood patient had eloped from the emergency department. She remained afebrile with normal mentation. Is this patient to be included in the SEP-1 Core Measure due to severe sepsis or septic shock? No   Exclusion criteria - the patient is NOT to be included for SEP-1 Core Measure due to:  2+ SIRS criteria are not met      REASSESSMENT          CRITICAL CARE TIME   Total Critical Care time was 20 minutes, excluding separately reportable procedures. There was a high probability of clinically significant/life threatening deterioration in the patient's condition which required my urgent intervention. CONSULTS:  None    PROCEDURES:  Unless otherwise noted below, none     Procedures    FINAL IMPRESSION      1. Abscess    2. IV drug user          DISPOSITION/PLAN   DISPOSITION        PATIENT REFERREDTO:  No follow-up provider specified.     DISCHARGEMEDICATIONS:  New Prescriptions    No medications on file          (Please note that portions of this note were completed with a voice recognition program.  Efforts were made to edit the dictations but occasionally words are mis-transcribed.)    Angela Mata MD (electronically signed)  Attending Emergency Physician          Angela Mata MD  10/31/22 2809

## 2022-11-03 PROCEDURE — 99285 EMERGENCY DEPT VISIT HI MDM: CPT

## 2022-11-04 ENCOUNTER — APPOINTMENT (OUTPATIENT)
Dept: CT IMAGING | Age: 52
DRG: 181 | End: 2022-11-04
Payer: COMMERCIAL

## 2022-11-04 ENCOUNTER — HOSPITAL ENCOUNTER (INPATIENT)
Age: 52
LOS: 10 days | Discharge: HOME | DRG: 181 | End: 2022-11-14
Attending: EMERGENCY MEDICINE | Admitting: STUDENT IN AN ORGANIZED HEALTH CARE EDUCATION/TRAINING PROGRAM
Payer: COMMERCIAL

## 2022-11-04 DIAGNOSIS — I72.4 FEMORAL ARTERY PSEUDO-ANEURYSM, LEFT (HCC): ICD-10-CM

## 2022-11-04 DIAGNOSIS — L03.90 CELLULITIS, UNSPECIFIED CELLULITIS SITE: Primary | ICD-10-CM

## 2022-11-04 PROBLEM — I72.9 PSEUDOANEURYSM (HCC): Status: ACTIVE | Noted: 2022-11-04

## 2022-11-04 LAB
A/G RATIO: 0.8 (ref 1.1–2.2)
ALBUMIN SERPL-MCNC: 3.6 G/DL (ref 3.4–5)
ALBUMIN SERPL-MCNC: 4.1 G/DL (ref 3.4–5)
ALP BLD-CCNC: 113 U/L (ref 40–129)
ALT SERPL-CCNC: 13 U/L (ref 10–40)
AMPHETAMINE SCREEN, URINE: POSITIVE
ANION GAP SERPL CALCULATED.3IONS-SCNC: 10 MMOL/L (ref 3–16)
ANION GAP SERPL CALCULATED.3IONS-SCNC: 14 MMOL/L (ref 3–16)
AST SERPL-CCNC: 21 U/L (ref 15–37)
BARBITURATE SCREEN URINE: ABNORMAL
BASOPHILS ABSOLUTE: 0.1 K/UL (ref 0–0.2)
BASOPHILS ABSOLUTE: 0.1 K/UL (ref 0–0.2)
BASOPHILS RELATIVE PERCENT: 0.9 %
BASOPHILS RELATIVE PERCENT: 1.1 %
BENZODIAZEPINE SCREEN, URINE: ABNORMAL
BILIRUB SERPL-MCNC: <0.2 MG/DL (ref 0–1)
BUN BLDV-MCNC: 11 MG/DL (ref 7–20)
BUN BLDV-MCNC: 14 MG/DL (ref 7–20)
CALCIUM SERPL-MCNC: 9.4 MG/DL (ref 8.3–10.6)
CALCIUM SERPL-MCNC: 9.9 MG/DL (ref 8.3–10.6)
CANNABINOID SCREEN URINE: ABNORMAL
CHLORIDE BLD-SCNC: 101 MMOL/L (ref 99–110)
CHLORIDE BLD-SCNC: 99 MMOL/L (ref 99–110)
CO2: 23 MMOL/L (ref 21–32)
CO2: 27 MMOL/L (ref 21–32)
COCAINE METABOLITE SCREEN URINE: POSITIVE
CREAT SERPL-MCNC: 0.7 MG/DL (ref 0.6–1.1)
CREAT SERPL-MCNC: 0.9 MG/DL (ref 0.6–1.1)
EOSINOPHILS ABSOLUTE: 0.2 K/UL (ref 0–0.6)
EOSINOPHILS ABSOLUTE: 0.3 K/UL (ref 0–0.6)
EOSINOPHILS RELATIVE PERCENT: 1.9 %
EOSINOPHILS RELATIVE PERCENT: 3.4 %
FENTANYL SCREEN, URINE: ABNORMAL
GFR SERPL CREATININE-BSD FRML MDRD: >60 ML/MIN/{1.73_M2}
GFR SERPL CREATININE-BSD FRML MDRD: >60 ML/MIN/{1.73_M2}
GLUCOSE BLD-MCNC: 116 MG/DL (ref 70–99)
GLUCOSE BLD-MCNC: 91 MG/DL (ref 70–99)
HCT VFR BLD CALC: 34.4 % (ref 36–48)
HCT VFR BLD CALC: 36.3 % (ref 36–48)
HEMOGLOBIN: 11.1 G/DL (ref 12–16)
HEMOGLOBIN: 11.7 G/DL (ref 12–16)
LACTIC ACID: 1.1 MMOL/L (ref 0.4–2)
LYMPHOCYTES ABSOLUTE: 2.2 K/UL (ref 1–5.1)
LYMPHOCYTES ABSOLUTE: 2.4 K/UL (ref 1–5.1)
LYMPHOCYTES RELATIVE PERCENT: 22.8 %
LYMPHOCYTES RELATIVE PERCENT: 26.8 %
Lab: ABNORMAL
MAGNESIUM: 2.1 MG/DL (ref 1.8–2.4)
MCH RBC QN AUTO: 28.2 PG (ref 26–34)
MCH RBC QN AUTO: 28.3 PG (ref 26–34)
MCHC RBC AUTO-ENTMCNC: 32.3 G/DL (ref 31–36)
MCHC RBC AUTO-ENTMCNC: 32.4 G/DL (ref 31–36)
MCV RBC AUTO: 87.3 FL (ref 80–100)
MCV RBC AUTO: 87.7 FL (ref 80–100)
METHADONE SCREEN, URINE: ABNORMAL
MONOCYTES ABSOLUTE: 0.5 K/UL (ref 0–1.3)
MONOCYTES ABSOLUTE: 0.6 K/UL (ref 0–1.3)
MONOCYTES RELATIVE PERCENT: 5.1 %
MONOCYTES RELATIVE PERCENT: 7.2 %
NEUTROPHILS ABSOLUTE: 5.1 K/UL (ref 1.7–7.7)
NEUTROPHILS ABSOLUTE: 7.3 K/UL (ref 1.7–7.7)
NEUTROPHILS RELATIVE PERCENT: 61.7 %
NEUTROPHILS RELATIVE PERCENT: 69.1 %
OPIATE SCREEN URINE: ABNORMAL
OXYCODONE URINE: ABNORMAL
PDW BLD-RTO: 13.8 % (ref 12.4–15.4)
PDW BLD-RTO: 14 % (ref 12.4–15.4)
PH UA: 6.5
PHENCYCLIDINE SCREEN URINE: ABNORMAL
PHOSPHORUS: 3.6 MG/DL (ref 2.5–4.9)
PLATELET # BLD: 580 K/UL (ref 135–450)
PLATELET # BLD: 595 K/UL (ref 135–450)
PMV BLD AUTO: 7.1 FL (ref 5–10.5)
PMV BLD AUTO: 7.2 FL (ref 5–10.5)
POTASSIUM SERPL-SCNC: 3.8 MMOL/L (ref 3.5–5.1)
POTASSIUM SERPL-SCNC: 4.9 MMOL/L (ref 3.5–5.1)
RBC # BLD: 3.93 M/UL (ref 4–5.2)
RBC # BLD: 4.16 M/UL (ref 4–5.2)
SODIUM BLD-SCNC: 136 MMOL/L (ref 136–145)
SODIUM BLD-SCNC: 138 MMOL/L (ref 136–145)
TOTAL PROTEIN: 9 G/DL (ref 6.4–8.2)
WBC # BLD: 10.5 K/UL (ref 4–11)
WBC # BLD: 8.3 K/UL (ref 4–11)

## 2022-11-04 PROCEDURE — 2580000003 HC RX 258: Performed by: PHYSICIAN ASSISTANT

## 2022-11-04 PROCEDURE — 85025 COMPLETE CBC W/AUTO DIFF WBC: CPT

## 2022-11-04 PROCEDURE — 02HV33Z INSERTION OF INFUSION DEVICE INTO SUPERIOR VENA CAVA, PERCUTANEOUS APPROACH: ICD-10-PCS | Performed by: INTERNAL MEDICINE

## 2022-11-04 PROCEDURE — 87040 BLOOD CULTURE FOR BACTERIA: CPT

## 2022-11-04 PROCEDURE — 99255 IP/OBS CONSLTJ NEW/EST HI 80: CPT | Performed by: INTERNAL MEDICINE

## 2022-11-04 PROCEDURE — 73706 CT ANGIO LWR EXTR W/O&W/DYE: CPT

## 2022-11-04 PROCEDURE — 36569 INSJ PICC 5 YR+ W/O IMAGING: CPT

## 2022-11-04 PROCEDURE — 2580000003 HC RX 258: Performed by: STUDENT IN AN ORGANIZED HEALTH CARE EDUCATION/TRAINING PROGRAM

## 2022-11-04 PROCEDURE — C1751 CATH, INF, PER/CENT/MIDLINE: HCPCS

## 2022-11-04 PROCEDURE — 96365 THER/PROPH/DIAG IV INF INIT: CPT

## 2022-11-04 PROCEDURE — 6370000000 HC RX 637 (ALT 250 FOR IP): Performed by: PHYSICIAN ASSISTANT

## 2022-11-04 PROCEDURE — 6370000000 HC RX 637 (ALT 250 FOR IP): Performed by: INTERNAL MEDICINE

## 2022-11-04 PROCEDURE — 83735 ASSAY OF MAGNESIUM: CPT

## 2022-11-04 PROCEDURE — 6360000002 HC RX W HCPCS: Performed by: STUDENT IN AN ORGANIZED HEALTH CARE EDUCATION/TRAINING PROGRAM

## 2022-11-04 PROCEDURE — 6360000004 HC RX CONTRAST MEDICATION: Performed by: EMERGENCY MEDICINE

## 2022-11-04 PROCEDURE — APPNB30 APP NON BILLABLE TIME 0-30 MINS: Performed by: NURSE PRACTITIONER

## 2022-11-04 PROCEDURE — 83605 ASSAY OF LACTIC ACID: CPT

## 2022-11-04 PROCEDURE — 36415 COLL VENOUS BLD VENIPUNCTURE: CPT

## 2022-11-04 PROCEDURE — 80053 COMPREHEN METABOLIC PANEL: CPT

## 2022-11-04 PROCEDURE — 86905 BLOOD TYPING RBC ANTIGENS: CPT

## 2022-11-04 PROCEDURE — 86870 RBC ANTIBODY IDENTIFICATION: CPT

## 2022-11-04 PROCEDURE — 76937 US GUIDE VASCULAR ACCESS: CPT

## 2022-11-04 PROCEDURE — 96367 TX/PROPH/DG ADDL SEQ IV INF: CPT

## 2022-11-04 PROCEDURE — 6370000000 HC RX 637 (ALT 250 FOR IP): Performed by: STUDENT IN AN ORGANIZED HEALTH CARE EDUCATION/TRAINING PROGRAM

## 2022-11-04 PROCEDURE — 1200000000 HC SEMI PRIVATE

## 2022-11-04 PROCEDURE — 86900 BLOOD TYPING SEROLOGIC ABO: CPT

## 2022-11-04 PROCEDURE — 6360000002 HC RX W HCPCS: Performed by: PHYSICIAN ASSISTANT

## 2022-11-04 PROCEDURE — 86901 BLOOD TYPING SEROLOGIC RH(D): CPT

## 2022-11-04 PROCEDURE — 80307 DRUG TEST PRSMV CHEM ANLYZR: CPT

## 2022-11-04 PROCEDURE — 86850 RBC ANTIBODY SCREEN: CPT

## 2022-11-04 PROCEDURE — 86880 COOMBS TEST DIRECT: CPT

## 2022-11-04 PROCEDURE — 94760 N-INVAS EAR/PLS OXIMETRY 1: CPT

## 2022-11-04 PROCEDURE — 99222 1ST HOSP IP/OBS MODERATE 55: CPT | Performed by: STUDENT IN AN ORGANIZED HEALTH CARE EDUCATION/TRAINING PROGRAM

## 2022-11-04 RX ORDER — LIDOCAINE HYDROCHLORIDE 10 MG/ML
5 INJECTION, SOLUTION EPIDURAL; INFILTRATION; INTRACAUDAL; PERINEURAL ONCE
Status: DISCONTINUED | OUTPATIENT
Start: 2022-11-04 | End: 2022-11-14 | Stop reason: HOSPADM

## 2022-11-04 RX ORDER — SODIUM CHLORIDE 0.9 % (FLUSH) 0.9 %
5-40 SYRINGE (ML) INJECTION PRN
Status: DISCONTINUED | OUTPATIENT
Start: 2022-11-04 | End: 2022-11-14 | Stop reason: HOSPADM

## 2022-11-04 RX ORDER — GABAPENTIN 100 MG/1
100 CAPSULE ORAL ONCE
Status: COMPLETED | OUTPATIENT
Start: 2022-11-04 | End: 2022-11-04

## 2022-11-04 RX ORDER — SODIUM CHLORIDE 0.9 % (FLUSH) 0.9 %
5-40 SYRINGE (ML) INJECTION EVERY 12 HOURS SCHEDULED
Status: DISCONTINUED | OUTPATIENT
Start: 2022-11-04 | End: 2022-11-04 | Stop reason: SDUPTHER

## 2022-11-04 RX ORDER — SODIUM CHLORIDE 9 MG/ML
INJECTION, SOLUTION INTRAVENOUS PRN
Status: DISCONTINUED | OUTPATIENT
Start: 2022-11-04 | End: 2022-11-14 | Stop reason: HOSPADM

## 2022-11-04 RX ORDER — IBUPROFEN 200 MG
800 TABLET ORAL 4 TIMES DAILY
Status: ON HOLD | COMMUNITY
End: 2022-11-14 | Stop reason: HOSPADM

## 2022-11-04 RX ORDER — NICOTINE 21 MG/24HR
1 PATCH, TRANSDERMAL 24 HOURS TRANSDERMAL DAILY
Status: DISCONTINUED | OUTPATIENT
Start: 2022-11-04 | End: 2022-11-14 | Stop reason: HOSPADM

## 2022-11-04 RX ORDER — ACETAMINOPHEN 650 MG/1
650 SUPPOSITORY RECTAL EVERY 6 HOURS PRN
Status: DISCONTINUED | OUTPATIENT
Start: 2022-11-04 | End: 2022-11-14 | Stop reason: HOSPADM

## 2022-11-04 RX ORDER — SODIUM CHLORIDE 0.9 % (FLUSH) 0.9 %
5-40 SYRINGE (ML) INJECTION EVERY 12 HOURS SCHEDULED
Status: DISCONTINUED | OUTPATIENT
Start: 2022-11-04 | End: 2022-11-14 | Stop reason: HOSPADM

## 2022-11-04 RX ORDER — ACETAMINOPHEN 500 MG
TABLET ORAL 4 TIMES DAILY
COMMUNITY

## 2022-11-04 RX ORDER — ACETAMINOPHEN 325 MG/1
650 TABLET ORAL EVERY 6 HOURS PRN
Status: DISCONTINUED | OUTPATIENT
Start: 2022-11-04 | End: 2022-11-14 | Stop reason: HOSPADM

## 2022-11-04 RX ORDER — ONDANSETRON 2 MG/ML
4 INJECTION INTRAMUSCULAR; INTRAVENOUS EVERY 6 HOURS PRN
Status: DISCONTINUED | OUTPATIENT
Start: 2022-11-04 | End: 2022-11-14 | Stop reason: HOSPADM

## 2022-11-04 RX ORDER — SODIUM CHLORIDE 0.9 % (FLUSH) 0.9 %
5-40 SYRINGE (ML) INJECTION PRN
Status: DISCONTINUED | OUTPATIENT
Start: 2022-11-04 | End: 2022-11-04 | Stop reason: SDUPTHER

## 2022-11-04 RX ORDER — POLYETHYLENE GLYCOL 3350 17 G/17G
17 POWDER, FOR SOLUTION ORAL DAILY PRN
Status: DISCONTINUED | OUTPATIENT
Start: 2022-11-04 | End: 2022-11-10

## 2022-11-04 RX ORDER — ENOXAPARIN SODIUM 100 MG/ML
30 INJECTION SUBCUTANEOUS DAILY
Status: DISCONTINUED | OUTPATIENT
Start: 2022-11-04 | End: 2022-11-08

## 2022-11-04 RX ORDER — OXYCODONE HYDROCHLORIDE 5 MG/1
5 TABLET ORAL EVERY 4 HOURS PRN
Status: DISCONTINUED | OUTPATIENT
Start: 2022-11-04 | End: 2022-11-14 | Stop reason: HOSPADM

## 2022-11-04 RX ORDER — SODIUM CHLORIDE 9 MG/ML
25 INJECTION, SOLUTION INTRAVENOUS PRN
Status: DISCONTINUED | OUTPATIENT
Start: 2022-11-04 | End: 2022-11-04 | Stop reason: SDUPTHER

## 2022-11-04 RX ORDER — ACETAMINOPHEN 325 MG/1
650 TABLET ORAL ONCE
Status: COMPLETED | OUTPATIENT
Start: 2022-11-04 | End: 2022-11-04

## 2022-11-04 RX ORDER — OXYCODONE HYDROCHLORIDE 10 MG/1
10 TABLET ORAL EVERY 4 HOURS PRN
Status: DISCONTINUED | OUTPATIENT
Start: 2022-11-04 | End: 2022-11-14 | Stop reason: HOSPADM

## 2022-11-04 RX ORDER — TRAMADOL HYDROCHLORIDE 50 MG/1
50 TABLET ORAL EVERY 6 HOURS PRN
Status: DISCONTINUED | OUTPATIENT
Start: 2022-11-04 | End: 2022-11-04

## 2022-11-04 RX ADMIN — GABAPENTIN 100 MG: 100 CAPSULE ORAL at 16:39

## 2022-11-04 RX ADMIN — ENOXAPARIN SODIUM 30 MG: 100 INJECTION SUBCUTANEOUS at 08:51

## 2022-11-04 RX ADMIN — TRAMADOL HYDROCHLORIDE 50 MG: 50 TABLET, COATED ORAL at 08:47

## 2022-11-04 RX ADMIN — OXYCODONE HYDROCHLORIDE 10 MG: 10 TABLET ORAL at 21:28

## 2022-11-04 RX ADMIN — SODIUM CHLORIDE 5 ML/HR: 9 INJECTION, SOLUTION INTRAVENOUS at 20:10

## 2022-11-04 RX ADMIN — TRAMADOL HYDROCHLORIDE 50 MG: 50 TABLET, COATED ORAL at 15:09

## 2022-11-04 RX ADMIN — ACETAMINOPHEN 650 MG: 325 TABLET ORAL at 23:04

## 2022-11-04 RX ADMIN — VANCOMYCIN HYDROCHLORIDE 1000 MG: 1 INJECTION, POWDER, LYOPHILIZED, FOR SOLUTION INTRAVENOUS at 03:37

## 2022-11-04 RX ADMIN — ACETAMINOPHEN 650 MG: 325 TABLET ORAL at 01:10

## 2022-11-04 RX ADMIN — ACETAMINOPHEN 650 MG: 325 TABLET ORAL at 16:39

## 2022-11-04 RX ADMIN — IOPAMIDOL 75 ML: 755 INJECTION, SOLUTION INTRAVENOUS at 03:16

## 2022-11-04 RX ADMIN — SODIUM CHLORIDE, PRESERVATIVE FREE 10 ML: 5 INJECTION INTRAVENOUS at 20:12

## 2022-11-04 RX ADMIN — OXYCODONE HYDROCHLORIDE 10 MG: 10 TABLET ORAL at 17:37

## 2022-11-04 RX ADMIN — VANCOMYCIN HYDROCHLORIDE 750 MG: 750 INJECTION, POWDER, LYOPHILIZED, FOR SOLUTION INTRAVENOUS at 20:12

## 2022-11-04 RX ADMIN — CEFEPIME 2000 MG: 2 INJECTION, POWDER, FOR SOLUTION INTRAVENOUS at 15:18

## 2022-11-04 RX ADMIN — CEFEPIME 2000 MG: 2 INJECTION, POWDER, FOR SOLUTION INTRAVENOUS at 02:54

## 2022-11-04 RX ADMIN — SODIUM CHLORIDE, PRESERVATIVE FREE 10 ML: 5 INJECTION INTRAVENOUS at 08:46

## 2022-11-04 ASSESSMENT — PAIN DESCRIPTION - ORIENTATION
ORIENTATION: LEFT
ORIENTATION: LEFT
ORIENTATION: LEFT;RIGHT
ORIENTATION: LEFT

## 2022-11-04 ASSESSMENT — PAIN DESCRIPTION - LOCATION
LOCATION: GENERALIZED
LOCATION: GROIN;LEG
LOCATION: GROIN
LOCATION: GROIN

## 2022-11-04 ASSESSMENT — PAIN DESCRIPTION - DESCRIPTORS
DESCRIPTORS: ACHING;DISCOMFORT
DESCRIPTORS: ACHING;DISCOMFORT;PRESSURE
DESCRIPTORS: ACHING
DESCRIPTORS: DISCOMFORT
DESCRIPTORS: ACHING;DISCOMFORT
DESCRIPTORS: ACHING

## 2022-11-04 ASSESSMENT — PAIN SCALES - GENERAL
PAINLEVEL_OUTOF10: 10
PAINLEVEL_OUTOF10: 0
PAINLEVEL_OUTOF10: 10
PAINLEVEL_OUTOF10: 0
PAINLEVEL_OUTOF10: 10

## 2022-11-04 ASSESSMENT — PAIN - FUNCTIONAL ASSESSMENT
PAIN_FUNCTIONAL_ASSESSMENT: PREVENTS OR INTERFERES SOME ACTIVE ACTIVITIES AND ADLS
PAIN_FUNCTIONAL_ASSESSMENT: PREVENTS OR INTERFERES SOME ACTIVE ACTIVITIES AND ADLS
PAIN_FUNCTIONAL_ASSESSMENT: ACTIVITIES ARE NOT PREVENTED
PAIN_FUNCTIONAL_ASSESSMENT: PREVENTS OR INTERFERES SOME ACTIVE ACTIVITIES AND ADLS

## 2022-11-04 ASSESSMENT — ENCOUNTER SYMPTOMS
SHORTNESS OF BREATH: 0
COLOR CHANGE: 1

## 2022-11-04 NOTE — CONSULTS
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 History Obtained From:  Epic and Patient        CHIEF COMPLAINT:     Chief Complaint   Patient presents with    Abscess     Started 5 days ago left side of groin         HISTORY OF PRESENT ILLNESS:  47 y.o. with a past history significant for hep C, IV drug abuse, history of DVT in the past, not on anticoagulation currently admitted to the hospital secondary to left groin pain and localized swelling with associated lymphadenopathy. Patient has been injecting drugs for a long time she been using all the veins she admits to injecting into the left groin she missed her vein and hit her artery in the past.  She was using opiates in the past and now started using cocaine and Amphetamines her last use was before coming in to  hospital.  Patient has been using profanity during consultation and very irritable. She was already evaluated by vascular surgery. Blood cultures are obtained are in process. CTA of the left lower extremity in the ED indicated left distal femoral artery pseudoaneurysm with underlying local changes also associated adenopathy likely reactive lymph nodes in the groin. She was seen in the ED on 10/1/22 for the pain in the groin but she eloped from the ED before being evaluated. Labs indicate creatinine 1.1, UDS positive for cocaine amphetamine, WBC 10.5 hemoglobin 11.1 platelets 372.   Location :   Left groin swelling pain localized adenopathy  Quality : Aching      Severity : 10/10    Duration :  1 week     Timing : constant   Context :  IVDA   Modifying factors :None   Associated signs and symptoms: no FEVERS, no chills local groin pain and swelling+         Past Medical History:    Past Medical History:   Diagnosis Date    Hepatitis C without hepatic coma     Hx of blood clots     DEEP VEIN THROMBOSIS FROM IV DRUG USE    IV drug abuse ([facility] [place] [address]     None for 5 mos    Methadone use        Past Surgical History:    Past Surgical History:   Procedure Laterality Date    BREAST BIOPSY Right 2015    Excisional     SECTION      TUBAL LIGATION         Current Medications:    Outpatient Medications Marked as Taking for the 22 encounter [facility] Encounter)   Medication Sig Dispense Refill    ibuprofen (ADVIL;MOTRIN) 200 MG tablet Take 800 mg by mouth in the morning, at noon, in the evening, and at bedtime      acetaminophen (TYLENOL) 500 MG tablet Take 1,000-2,000 mg by mouth in the morning, at noon, in the evening, and at bedtime         Allergies:  Pcn [penicillins]    Immunizations :   Immunization History   Administered Date(s) Administered    COVID-19, PFIZER PURPLE top, DILUTE for use, (age 15 y+), 30mcg/0.3mL 2021    Tdap (Boostrix, Adacel) 2020         Social History:     Social History     Tobacco Use    Smoking status: Every Day     Packs/day: 1.00     Years: 20.00     Pack years: 20.00     Types: Cigarettes    Smokeless tobacco: Never   Substance Use Topics    Alcohol use: Yes    Drug use: Yes     Frequency: 1.0 times per week     Types: IV, Cocaine     Social History     Tobacco Use   Smoking Status Every Day    Packs/day: 1.00    Years: 20.00    Pack years: 20.00    Types: Cigarettes   Smokeless Tobacco Never      Family HISTORY : NO DVT NO COPD         REVIEW OF SYSTEMS:      Constitutional:  negative for fevers, chills, night sweats  Eyes:  negative for blurred vision, eye discharge, visual disturbance   HEENT:  negative for hearing loss, ear drainage,nasal congestion  Respiratory:  negative for cough, shortness of breath or hemoptysis   Cardiovascular:  negative for chest pain, palpitations, syncope  Gastrointestinal:  negative for nausea, vomiting, diarrhea, constipation, abdominal pain  Genitourinary:  negative for frequency, dysuria, urinary incontinence, hematuria  Hematologic/Lymphatic:  negative for easy bruising, bleeding and lymphadenopathy  Allergic/Immunologic:  negative for recurrent infections, angioedema, anaphylaxis   Endocrine:  negative for weight changes, polyuria, polydipsia and polyphagia  Musculoskeletal:  Left groin pain+ swelling+ and local skin changes+ scar+   pain, swelling, decreased range of motion  Integumentary: No rashes, skin lesions  Neurological:  negative for headaches, slurred speech, unilateral weakness  Psychiatric: negative for hallucinations,confusion,agitation.      PHYSICAL EXAM:      Vitals:    BP (!) 159/72   Pulse 84   Temp 97.9 °F (36.6 °C) (Oral)   Resp 16   Ht 5' 2\" (1.575 m)   Wt 112 lb 14 oz (51.2 kg)   SpO2 93%   BMI 20.65 kg/m²     General Appearance: alert,in some  acute distress, no pallor, no icterus  needle marks+ un cooperative   Skin: warm and dry, no rash or erythema  Head: normocephalic and atraumatic  Eyes: pupils equal, round, and reactive to light, conjunctivae normal  ENT: tympanic membrane, external ear and ear canal normal bilaterally, nose without deformity, nasal mucosa and turbinates normal without polyps  Neck: supple and non-tender without mass, no thyromegaly  no cervical lymphadenopathy  Pulmonary/Chest: clear to auscultation bilaterally- no wheezes, rales or rhonchi, normal air movement, no respiratory distress  Cardiovascular: normal rate, regular rhythm, normal S1 and S2, no murmurs, rubs, clicks, or gallops, no carotid bruits  Abdomen: soft, non-tender, non-distended, normal bowel sounds, no masses or organomegaly  Extremities: no cyanosis, clubbing or edema  Musculoskeletal: normal range of motion, no joint swelling, deformity or tenderness  Integumentary: No rashes, no abnormal skin lesions, no petechiae  Neurologic: reflexes normal and symmetric, no cranial nerve deficit  Psych:  Orientation, sensorium, mood irritable    Lines: IV  Left groin aneurysm + local swelling and scar+ no sinus tract adenopathy not much local cellulitis noted     DATA:    CBC:   Lab Results   Component Value Date    WBC 10.5 11/04/2022    HGB 11.1 (L) 11/04/2022    HCT 34.4 (L) 11/04/2022    MCV 87.7 11/04/2022     (H) 11/04/2022     RENAL:   Lab Results   Component Value Date    CREATININE 0.9 11/04/2022    BUN 14 11/04/2022     11/04/2022    K 3.8 11/04/2022    CL 99 11/04/2022    CO2 23 11/04/2022     SED RATE: No results found for: SEDRATE  CK: No results found for: CKTOTAL  CRP: No results found for: CRP  Hepatic Function Panel:   Lab Results   Component Value Date/Time    ALKPHOS 113 11/04/2022 01:16 AM    ALT 13 11/04/2022 01:16 AM    AST 21 11/04/2022 01:16 AM    PROT 9.0 11/04/2022 01:16 AM    BILITOT <0.2 11/04/2022 01:16 AM    BILIDIR 0.3 11/16/2019 12:58 AM    IBILI 0.5 11/16/2019 12:58 AM    LABALBU 4.1 11/04/2022 01:16 AM     UA:  Lab Results   Component Value Date/Time    COLORU Yellow 01/01/2019 09:39 PM    CLARITYU Clear 01/01/2019 09:39 PM    GLUCOSEU Negative 01/01/2019 09:39 PM    BILIRUBINUR Negative 01/01/2019 09:39 PM    KETUA 15 01/01/2019 09:39 PM    SPECGRAV >=1.030 01/01/2019 09:39 PM    BLOODU Negative 01/01/2019 09:39 PM    PHUR 5.5 01/01/2019 09:39 PM    PROTEINU Negative 01/01/2019 09:39 PM    UROBILINOGEN 0.2 01/01/2019 09:39 PM    NITRU Negative 01/01/2019 09:39 PM    LEUKOCYTESUR Negative 01/01/2019 09:39 PM    LABMICR Not Indicated 01/01/2019 09:39 PM    URINETYPE Not Specified 01/01/2019 09:39 PM      Urine Microscopic: No results found for: LABCAST, BACTERIA, COMU, HYALCAST, WBCUA, RBCUA, EPIU  Urine Reflex to Culture:   Lab Results   Component Value Date/Time    URRFLXCULT Not Indicated 01/01/2019 09:39 PM        Ref Range & Units 11/04/22 1020   Amphetamine Screen, Urine Negative <1000ng/mL POSITIVE Abnormal     Comment: High concentrations of ephedrine/pseudoephedrine or   phenylpropanolamine may cause false positive results   for amphetamine. Therefore, confirmatory testing for   amphetamine should be considered if clinically indicated. Barbiturate Screen, Ur Negative <200 ng/mL Neg    Benzodiazepine Screen, Urine Negative <200 ng/mL Neg    Cannabinoid Scrn, Ur Negative <50 ng/mL Neg    Cocaine Metabolite Screen, Urine Negative <300 ng/mL POSITIVE Abnormal     Opiate Scrn, Ur Negative <300 ng/mL Neg    Comment: \"Therapeutic levels of pain medication, especially oxycontin and synthetic   opioids, may not be detected by this Methodology. Pain management screen   panel  Drug panel-PM-Hi Res Ur, Interp (PAIN) should be considered for drug   monitoring \". MICRO: cultures reviewed and updated by me       Blood Culture: No results found for: [facility], BLOODCULT2  Procedure Component Value Units Date/Time   Culture, Blood 1 [[id number]] Collected: 11/04/22 0250   Order Status: Sent Specimen: Blood Updated: 11/04/22 0259   Culture, Blood 2 [[id number]]    Order Status: Sent Specimen: Blood      Viral Culture:    No results found for: COVID19  Urine Culture: No results for input(s): Dee Poole in the last 72 hours. Scheduled Meds:   sodium chloride flush  5-40 mL IntraVENous 2 times per day    enoxaparin  30 mg SubCUTAneous Daily    cefepime  2,000 mg IntraVENous Q12H    vancomycin (VANCOCIN) intermittent dosing (placeholder)   Other RX Placeholder    vancomycin  750 mg IntraVENous Q12H    nicotine  1 patch TransDERmal Daily       Continuous Infusions:   sodium chloride         PRN Meds:  sodium chloride flush, sodium chloride, polyethylene glycol, acetaminophen **OR** acetaminophen, ondansetron, traMADol    Imaging:   CTA LOWER EXTREMITY LEFT W CONTRAST   Final Result   1. Large pseudoaneurysm of the distal left femoral artery likely correlating   to history of IV drug abuse. 2. There is interval thickening and inflammation along the femoral artery and   the proximal superficial femoral artery suggesting vasculitis in association   with above.    3. Cellulitis of the left groin with multiple reactive enlarged lymph nodes. All pertinent images and reports for the current Hospitalization were reviewed by me. 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+  IVDA on going  H/o Direct Needle injection into Left groin  Cocaine abuse  Amphetamine abuse  Hep C+Ve  Left inguinal adenopathy  BMI at  20   CTA very abnormal involving the Left distal femoral artery Pseudo aneurysm       Unfortunately major complication from direct needle injury to Leti femoral artery and IVDA has been using Cocaine and Amphetamine - seen by vascular team concerned about rupture and bleed - will need IV abx given the presentation concern for local infection she has no fever or reactive WBC so no direct bacterial invasion yet - possible local reaction risk for seeding and systemic spread        Pt not very cooperative during exam has been using profanity       Labs, Microbiology, Radiology and pertinent results from current hospitalization and care every where were reviewed by me as a part of the consultation. PLAN :   Cont IV Vancomycin  x 750 mg q 12 HRS  IV Cefepime x 2 gm q 12 HR  Blood cx in process  ESR, CRP  HIV screen   Vascular surgery following   Watch for Withdrawal       Discussed with patient/Family and Nursing   Risk of Complications/Morbidity: High      Illness(es)/ Infection present that pose threat to bodily function. There is potential for severe exacerbation of infection/side effects of treatment. Therapy requires intensive monitoring for antimicrobial agent toxicity. Thanks for allowing me to participate in your patient's care please call me with any questions or concerns.     Dr. Eliot Oconnell MD  [facility]  Phone: [phone]   Fax : [phone]

## 2022-11-04 NOTE — ED PROVIDER NOTES
I PERSONALLY SAW THE PATIENT AND PERFORMED A SUBSTANTIVE PORTION OF THE VISIT INCLUDING ALL ASPECTS OF THE MEDICAL DECISION MAKING PROCESS. [address]      Pt Name: Hemalatha Hardy  MRN: [id number]  Reena 1970  Date of evaluation: 11/3/2022  Provider: Deirdre Chapin MD    CHIEF COMPLAINT       Chief Complaint   Patient presents with    Abscess     Started 5 days ago left side of groin       HISTORY OF PRESENT ILLNESS    Hemalatha Hardy is a 47 y.o. female who presents to the emergency department with cellulitis. Patient with 5-day history of left groin cellulitis. Positive for infection. Saw  recently and was told she needed admitted for antibiotics. Does have history of drug abuse. No chest pain or shortness of breath. 4-10 achy pain. Worse with movement. Better with rest.  Started spontaneously. Constant in nature. No other associated symptoms. Nursing Notes were reviewed. Including nursing noted for FM, Surgical History, Past Medical History, Social History, vitals, and allergies; agree with all. REVIEW OF SYSTEMS       Review of Systems    Except as noted above the remainder of the review of systems was reviewed and negative.      PAST MEDICAL HISTORY     Past Medical History:   Diagnosis Date    Hepatitis C without hepatic coma     Hx of blood clots     DEEP VEIN THROMBOSIS FROM IV DRUG USE    IV drug abuse ([facility] [address]     None for 5 mos    Methadone use        SURGICAL HISTORY       Past Surgical History:   Procedure Laterality Date    BREAST BIOPSY Right 2015    Excisional     SECTION      TUBAL LIGATION         CURRENT MEDICATIONS       Current Discharge Medication List        CONTINUE these medications which have NOT CHANGED    Details   naproxen (NAPROSYN) 500 MG tablet Take 1 tablet by mouth 2 times daily as needed for Pain  Qty: 20 tablet, Refills: 0             ALLERGIES     Pcn [penicillins]    FAMILY HISTORY      No family history on file. SOCIAL HISTORY       Social History     Socioeconomic History    Marital status: Single   Tobacco Use    Smoking status: Every Day     Packs/day: 1.00     Years: 20.00     Pack years: 20.00     Types: Cigarettes    Smokeless tobacco: Never   Substance and Sexual Activity    Alcohol use: Yes    Drug use: Yes     Frequency: 1.0 times per week     Types: IV, Cocaine    Sexual activity: Yes     Partners: Male       PHYSICAL EXAM       ED Triage Vitals [11/04/22 0021]   BP Temp Temp Source Heart Rate Resp SpO2 Height Weight   (!) 166/103 97.2 °F (36.2 °C) Oral 97 18 100 % 5' 2\" (1.575 m) 111 lb 1.8 oz (50.4 kg)       Physical Exam  Vitals and nursing note reviewed. Constitutional:       General: She is not in acute distress. Appearance: She is well-developed. She is not ill-appearing, toxic-appearing or diaphoretic. HENT:      Head: Normocephalic and atraumatic. Right Ear: External ear normal.      Left Ear: External ear normal.   Eyes:      General:         Right eye: No discharge. Left eye: No discharge. Conjunctiva/sclera: Conjunctivae normal.      Pupils: Pupils are equal, round, and reactive to light. Cardiovascular:      Rate and Rhythm: Normal rate and regular rhythm. Heart sounds: No murmur heard. Pulmonary:      Effort: Pulmonary effort is normal. No respiratory distress. Breath sounds: Normal breath sounds. No wheezing or rales. Abdominal:      General: Bowel sounds are normal. There is no distension. Palpations: Abdomen is soft. There is no mass. Tenderness: There is no abdominal tenderness. There is no guarding or rebound. Genitourinary:     Comments: Deferred  Musculoskeletal:         General: No deformity. Normal range of motion. Cervical back: Normal range of motion and neck supple. Skin:     General: Skin is warm. Findings: Erythema present. No rash.    Neurological:      Mental Status: She is alert and oriented to person, place, and time. She is not disoriented. Cranial Nerves: No cranial nerve deficit. Motor: No atrophy or abnormal muscle tone. Coordination: Coordination normal.   Psychiatric:         Behavior: Behavior normal.         Thought Content: Thought content normal.       DIAGNOSTIC RESULTS     RADIOLOGY:   Non-plain film images such as CT, Ultrasoundand MRI are read by the radiologist. Plain radiographic images are visualized and preliminarily interpreted by the emergency physician with the below findings:    Impression   1. Large pseudoaneurysm of the distal left femoral artery likely correlating   to history of IV drug abuse. 2. There is interval thickening and inflammation along the femoral artery and   the proximal superficial femoral artery suggesting vasculitis in association   with above. 3. Cellulitis of the left groin with multiple reactive enlarged lymph nodes. ED BEDSIDE ULTRASOUND:   Performed by ED Physician - none    LABS:  Labs Reviewed   CBC WITH AUTO DIFFERENTIAL - Abnormal; Notable for the following components:       Result Value    RBC 3.93 (*)     Hemoglobin 11.1 (*)     Hematocrit 34.4 (*)     Platelets 684 (*)     All other components within normal limits   COMPREHENSIVE METABOLIC PANEL - Abnormal; Notable for the following components:    Glucose 116 (*)     Total Protein 9.0 (*)     Albumin/Globulin Ratio 0.8 (*)     All other components within normal limits   CULTURE, BLOOD 2   CULTURE, BLOOD 1   LACTIC ACID   URINALYSIS WITH REFLEX TO CULTURE   CBC WITH AUTO DIFFERENTIAL   RENAL FUNCTION PANEL   MAGNESIUM       All other labs were withinnormal range or not returned as of this dictation.     EMERGENCY DEPARTMENT COURSE and DIFFERENTIAL DIAGNOSIS/MDM:     PMH, Surgical Hx, FH, Social Hx reviewed by myself (ETOH usage, Tobacco usage, Drug usage reviewed by myself, no pertinent Hx)- No Pertinent Hx     Old records were reviewed by me     MDM 52-year-old with cellulitis from IV drug abuse. Antibiotics initiated. Also concern for inflammation to the blood vessels. No chest pain or shortness of breath. Admission. Labs-   Diagnostic findings  Medications  Consults  Disposition Admission    I PERSONALLY SAW THE PATIENT AND PERFORMED A SUBSTANTIVE PORTION OF THE VISIT INCLUDING ALL ASPECTS OF THE MEDICAL DECISION MAKING PROCESS. The primary clinician of record [employer]   Total Critical Caretime was 49 minutes, excluding separately reportable procedures. There was a high probability of clinically significant/life threatening deterioration in the patient's condition which required my urgent intervention. CRITICAL CARE  I personally saw the patient and independently provided 49 minutes of non-concurrent critical care out of the total shared critical care time provided. This excludes seperately billable procedures. Critical care time was provided for patient as above that required close evaluation and/or intervention with concern for potential patient decompensation. PROCEDURES:  Unlessotherwise noted below, none    FINAL IMPRESSION      1.  Cellulitis, unspecified cellulitis site          DISPOSITION/PLAN   DISPOSITION Admitted 11/04/2022 04:13:20 AM      (Please note that portions ofthis note were completed with a voice recognition program.  Efforts were made to edit the dictations but occasionally words are mis-transcribed.)    Julius Lugo MD(electronically signed)  Attending Emergency Physician        Julius Lugo MD  11/04/22 0012

## 2022-11-04 NOTE — PROGRESS NOTES
4 Eyes Skin Assessment     NAME:  Kenny Tate  YOB: 1970  MEDICAL RECORD NUMBER:  [id number]    The patient is being assess for  Admission    I agree that 2 RN's have performed a thorough Head to Toe Skin Assessment on the patient. ALL assessment sites listed below have been assessed. Areas assessed by both nurses:    Head, Face, Ears, Shoulders, Back, Chest, Arms, Elbows, Hands, Sacrum. Buttock, Coccyx, Ischium, and Legs. Feet and Heels        Does the Patient have a Wound?  No noted wound(s)       Herb Prevention initiated:  No   Wound Care Orders initiated:  No    Pressure Injury (Stage 3,4, Unstageable, DTI, NWPT, and Complex wounds) if present place referral/consult order under [de-identified] No    New and Established Ostomies if present place consult order under : No      Nurse 1 eSignature: Electronically signed by Gómez Mccracken RN on 11/4/22 at 5:08 AM EDT    **SHARE this note so that the co-signing nurse is able to place an eSignature**    Nurse 2 eSignature: Electronically signed by Nanic Nguyen RN on 11/4/22 at 5:09 AM EDT

## 2022-11-04 NOTE — PROGRESS NOTES
Upon arrival to place PICC line assessed chart for issues related to picc placement, check for consent, and did time out with HIGINIO Amato. Pt. Tolerated PICC placement well, no difficulty accessing basilic vein and 3CG technology used to verify PICC tip placement. Positive P wave with no negative deflection. Printed wave form and placed In chart.  Reported off to [employer]

## 2022-11-04 NOTE — PLAN OF CARE
Problem: Discharge Planning  Goal: Discharge to home or other facility with appropriate resources  Outcome: Progressing  Flowsheets (Taken 11/4/2022 9190)  Discharge to home or other facility with appropriate resources: Identify barriers to discharge with patient and caregiver     Problem: Safety - Adult  Goal: Free from fall injury  Outcome: Progressing

## 2022-11-04 NOTE — PROGRESS NOTES
Medication Reconciliation    List of medications patient is currently taking is complete. Source of information: 1. Conversation with patient at bedside                                      2. EPIC records      Allergies  Pcn [penicillins]     Notes regarding home medications:   1. Patient has been taking a significant amount of acetaminophen (1471-3315 mg QID) and ibuprofen 800 mg QID for the past week for pain  2. Patient recently purchased gabapentin from a friend and has been taking for the past week  3.  Patient obtains flurazepam from [facility] and uses daily for anxiety

## 2022-11-04 NOTE — H&P
Hospital Medicine History & Physical      PCP: No primary care provider on file. Date of Admission: 2022    Date of Service: Pt seen/examined on 2022 and Admitted to Inpatient with expected LOS greater than two midnights due to medical therapy. Chief Complaint: Groin pain      History Of Present Illness:      47 y.o. female who presented to [facility] with worsening left groin pain, patient had a 5 days history of chronic cellulitis, to emergency department she is having pain in her left groin up to 10 out of 10 intensity worse with movement sharp pressure-like, shooting to her legs, no obvious alleviating factors, no obvious trauma, came to emergency department, found to have cellulitis with possible abscess being admitted for further management and treatment. Past Medical History:          Diagnosis Date    Hepatitis C without hepatic coma     Hx of blood clots     DEEP VEIN THROMBOSIS FROM IV DRUG USE    IV drug abuse ([facility] [address]     None for 5 mos    Methadone use        Past Surgical History:          Procedure Laterality Date    BREAST BIOPSY Right 2015    Excisional     SECTION      TUBAL LIGATION         Medications Prior to Admission:      Prior to Admission medications    Medication Sig Start Date End Date Taking? Authorizing Provider   ibuprofen (ADVIL;MOTRIN) 200 MG tablet Take 800 mg by mouth in the morning, at noon, in the evening, and at bedtime   Yes Historical Provider, MD   acetaminophen (TYLENOL) 500 MG tablet Take 1,000-2,000 mg by mouth in the morning, at noon, in the evening, and at bedtime   Yes Historical Provider, MD       Allergies:  Pcn [penicillins]    Social History:      The patient currently lives     TOBACCO:   reports that she has been smoking cigarettes. She has a 20.00 pack-year smoking history. She has never used smokeless tobacco.  ETOH:   reports current alcohol use.   E-cigarette/Vaping       Questions Responses    E-cigarette/Vaping Use Start Date     Passive Exposure     Quit Date     Counseling Given     Comments               Family History:      Reviewed and negative in regards to presenting illness/complaint. No family history on file. REVIEW OF SYSTEMS COMPLETED:   Pertinent positives as noted in the HPI. All other systems reviewed and negative. PHYSICAL EXAM PERFORMED:    BP (!) 159/72   Pulse 84   Temp 97.9 °F (36.6 °C) (Oral)   Resp 16   Ht 5' 2\" (1.575 m)   Wt 112 lb 14 oz (51.2 kg)   SpO2 93%   BMI 20.65 kg/m²     General appearance:  No apparent distress  HEENT:  Normal cephalic  Neck: Supple  Respiratory:  Normal respiratory effort. Clear to auscultation, bilaterally without Rales/Wheezes/Rhonchi. Cardiovascular:  Regular rate and rhythm with normal S1/S2 without murmurs, rubs or gallops. Abdomen: Soft, non-tender, non-distended   Musculoskeletal:  No clubbing, cyanosis   Skin: Skin color, texture, turgor normal.  No rashes or lesions. Neurologic:  No focal drifting   Psychiatric:  Alert and oriented  Capillary Refill: Brisk,3 seconds, normal  Peripheral Pulses: +2 palpable, equal bilaterally   Groin patient examined in the presence of her nurse and after getting her permission, nodularity noted painful in the left groin. Labs:     Recent Labs     11/04/22 0116 11/04/22  1150   WBC 10.5 8.3   HGB 11.1* 11.7*   HCT 34.4* 36.3   * 580*     Recent Labs     11/04/22  0116      K 3.8   CL 99   CO2 23   BUN 14   CREATININE 0.9   CALCIUM 9.9     Recent Labs     11/04/22  0116   AST 21   ALT 13   BILITOT <0.2   ALKPHOS 113     No results for input(s): INR in the last 72 hours. No results for input(s): Pamla Clines in the last 72 hours.     Urinalysis:      Lab Results   Component Value Date/Time    NITRU Negative 01/01/2019 09:39 PM    BLOODU Negative 01/01/2019 09:39 PM    SPECGRAV >=1.030 01/01/2019 09:39 PM    GLUCOSEU Negative 01/01/2019 09:39 PM       Radiology:         CTA LOWER EXTREMITY LEFT W CONTRAST   Final Result   1. Large pseudoaneurysm of the distal left femoral artery likely correlating   to history of IV drug abuse. 2. There is interval thickening and inflammation along the femoral artery and   the proximal superficial femoral artery suggesting vasculitis in association   with above. 3. Cellulitis of the left groin with multiple reactive enlarged lymph nodes. Consults:    IP CONSULT TO INFECTIOUS DISEASES  IP CONSULT TO VASCULAR SURGERY  PHARMACY TO DOSE VANCOMYCIN    ASSESSMENT:    Active Hospital Problems    Diagnosis Date Noted    Cellulitis [L03.90] 11/04/2022     Priority: Medium    Pseudoaneurysm ([address] [I72.9] 11/04/2022     Priority: Medium         PLAN:    Cellulitis and possible abscess of her left groin, IV antibiotics, vascular surgery consulted, repeat labs in a.m. Large pseudoaneurysm of the distal left femoral artery likely correlating with history of IV drug abuse, vascular surgery consulted, potential surgical intervention, plan of care discussed with patient in details, all questions answered. Repeat CBC CMP in a.m. Urine positive for cocaine and amphetamine, IV drug use, counseled  IV drug use counseled      Diet: Diet NPO Exceptions are: Sips of Water with Meds  Code Status: Full Code        Dispo -inpatient 2 to 3 days       Lily Hooks MD    Thank you No primary care provider on file. for the opportunity to be involved in this patient's care. If you have any questions or concerns please feel free to contact me at [phone].

## 2022-11-04 NOTE — ED NOTES
Report given to romina SYLVESTER on 5w and patient taken to room 0680     Ravin Webb RN  11/04/22 9751

## 2022-11-04 NOTE — CONSULTS
[facility]  Consultation Note    11/4/2022 9:37 AM    Chief Complaint: Left Groin Pain     Reason for Consultation: Cellulitis with Left Distal Femoral Artery Pseudoaneurysm    History of Present Illness:  Patient is a 47 y.o. female who presented to the ED on 11/4/2022 with complaints of Left Groin Pain. She has a significant PMH of Hepatitis C, DVT d/t IV Drug Use (not taking AC) and IV Drug Abuse. The patient reports she has been using intravenous Cocaine and has been injecting \"anywhere she can find a vein\". She tells me she has a history of IV Opiate Drug abuse; however, she has not used Opiates since 2014 and is presently using intravenous Cocaine. She tells me the last time she used Cocaine was late last night/early this morning prior to presentation to the ED - she is not sure of what time exactly. She tells me her left groin has been painful for several days. She was actually seen in the ED on 10/31/2022 and apparently eloped prior to being fully treated. Admission for IV antibiotics was advised by the ED Physician prior to elopement on 10/31/2022. The patient tells me she \"thought she hit and artery\" and \"hates when she does that\" and describes a shooting pain down her leg when she \"misses\". A CTA LLE was performed in the ED and she was noted to have a Left distal Femoral Artery Pseudoaneurysm just proximal to bifurcation measuring 2.9 cm x 1.5 cm. She was also noted to have bulky reactive lymph nodes in her Left Groin as well. We have been consulted to evaluate the patient for Cellulitis with Left Distal Femoral Artery Pseudoaneurysm. At present, the patient is seen sitting up in bed, she is alert and oriented, she reports ongoing pain to her Left Groin, she appears to be in stable condition. Review of Systems  Review of Systems   Constitutional:  Positive for chills.         She reports difficulty with knowing if she has had a fever recently secondary to menopause and \"hot flashes\"   HENT: Negative. Respiratory:  Negative for shortness of breath. Cardiovascular:  Positive for leg swelling. Skin:  Positive for color change. Right Inner thigh with area of most recent injection. Scattered injection sites   Neurological: Negative. Psychiatric/Behavioral: Negative. Past Medical History:   Diagnosis Date    Hepatitis C without hepatic coma     Hx of blood clots     DEEP VEIN THROMBOSIS FROM IV DRUG USE    IV drug abuse ([facility] [address]     None for 5 mos    Methadone use        Past Surgical History:   Procedure Laterality Date    BREAST BIOPSY Right 2015    Excisional     SECTION      TUBAL LIGATION         Allergies   Allergen Reactions    Pcn [Penicillins] Hives       Social History     Socioeconomic History    Marital status: Single     Spouse name: Not on file    Number of children: Not on file    Years of education: Not on file    Highest education level: Not on file   Occupational History    Not on file   Tobacco Use    Smoking status: Every Day     Packs/day: 1.00     Years: 20.00     Pack years: 20.00     Types: Cigarettes    Smokeless tobacco: Never   Substance and Sexual Activity    Alcohol use: Yes    Drug use: Yes     Frequency: 1.0 times per week     Types: IV, Cocaine    Sexual activity: Yes     Partners: Male   Other Topics Concern    Not on file   Social History Narrative    Not on file     Social Determinants of Health     Financial Resource Strain: Not on file   Food Insecurity: Not on file   Transportation Needs: Not on file   Physical Activity: Not on file   Stress: Not on file   Social Connections: Not on file   Intimate Partner Violence: Not on file   Housing Stability: Not on file       No family history on file.     Vital Signs  Vitals:    22 0515 22 0626 22 0814 22 0815   BP:   (!) 152/106    Pulse:  87 84    Resp:  15 19    Temp:   98.7 °F (37.1 °C)    TempSrc:   Oral    SpO2:    91%   Weight: 112 lb 14 oz (51.2 kg)      Height:           I/O:  No intake or output data in the 24 hours ending 11/04/22 0937    Physical Exam:  General: no apparent distress, alert and oriented, afebrile  Psychiatric: mood and affect are within normal limits  Head/Eyes/Ears/Nose/Throat: normocephalic and atraumatic, face symmetric, normal hearing, nose - negative  Neck: normal appearance and no deformity, supple, trachea midline  Chest/Lungs: clear to auscultation bilaterally, non labored breathing no tachypnea, retractions or cyanosis  Cardiac: Heart regular rate and rhythm  Abdomen: soft, nondistended, active bowel sounds, and nontender  Extremities: BUE with scattered injection sites noted, +1/4 edema to BLE  -RLE - mild scattered erythema, inner thigh with area of discoloration - area of most recent IV drug injection pt reports, scattered injection sites noted  -LLE - mild scattered erythema, Left groin abscess with scar-tissue like feeling vs. palpable lymph nodes with palpation, left foot warm, scattered injection sites noted    Pulses:  -R Radial: +2/4 palpable   -L Radial: +2/4 palpable  -R Femoral: +2/4 palpable  -L Femoral: +2/4 palpable  -R DP: +2/4 palpable  -L DP: +2/4 palpable  -R PT: biphasic doppler signal present  -L PT: biphasic doppler signal present      Labs  Lab Results   Component Value Date/Time    WBC 10.5 11/04/2022 01:16 AM    HGB 11.1 11/04/2022 01:16 AM    HCT 34.4 11/04/2022 01:16 AM    MCV 87.7 11/04/2022 01:16 AM     11/04/2022 01:16 AM     Lab Results   Component Value Date/Time     11/04/2022 01:16 AM    K 3.8 11/04/2022 01:16 AM    K 3.7 11/16/2019 12:58 AM    CL 99 11/04/2022 01:16 AM    CO2 23 11/04/2022 01:16 AM    BUN 14 11/04/2022 01:16 AM    CREATININE 0.9 11/04/2022 01:16 AM      No results found for: GLU    Scheduled Meds:    sodium chloride flush  5-40 mL IntraVENous 2 times per day    enoxaparin  30 mg SubCUTAneous Daily    cefepime  2,000 mg IntraVENous Q12H    vancomycin [facility]) intermittent dosing (placeholder)   Other RX Placeholder    vancomycin  750 mg IntraVENous Q12H     Continuous Infusions:    sodium chloride         Imaging:   CTA LLE - 11/4/2022  Impression  1. Large pseudoaneurysm of the distal left femoral artery likely correlating to history of IV drug abuse. 2. There is interval thickening and inflammation along the femoral artery and the proximal superficial femoral artery suggesting vasculitis in association with above. 3. Cellulitis of the left groin with multiple reactive enlarged lymph nodes. Assessment:  -Left Distal Femoral Artery Pseudoaneurysm  -IV Drug use - Cocaine  -Left Groin Pain  -Left Foot DP palpable and PT with Biphasic doppler signal  -Mild scattered Erythema to BLE  -WBC 10.5  -Lactic 1.1    Plan:   -IV Antibiotics initiated by Hospitalist - Vancomycin and Cefepime  -ID Consulted by Hospitalist  -Will likely need surgical treatment in the near future - discussed with Dr. Cody Xiong and no Emergent plan for surgery  -Will keep NPO for now for possible surgical planning (pt did eat breakfast)  -Hold any further AC today - received Lovenox 30 mg this AM    All pertinent information and plan of care discussed with Dr. Elby Castleman and Dr. Fran Gee. All questions and concerns were addressed with the patient. I have discussed the above stated plan with the patient and the nurse. The patient verbalized understanding and agreed with the plan.     Thank you for allowing to us to participate in the care of Rajan Wilkes      Electronically signed by SANYA Ordoñez CNP on 11/4/2022 at 9:37 AM    VASCULAR STAFF    Patient evaluated at the same time in conjunction with the ACP or resident of which I performed greater than 50% of the history, physical exam, and/or medical decision making:    Patient presents with left groin pain  IV drug user, last use of cocaine appears to have been yesterday  Patient complains of left groin pain x 7 days, somewhat worse over the last 2 days  CTA performed given pulsatile groin mass which demonstrates femoral pseudoaneurysm with obliterated common femoral artery  No cellulitis or thinning of skin in the groin to suggest acute change in condition  Patient has been injecting into her groins for a long time  ID on board  Check HIV  Agree with IV antibiotics  Unfortunately conduit access for femoral artery repair is an issue  Extra anatomic bypass could be considered but it could also serve as an easy source for access for IV drugs  Will recommend direct left femoral artery replacement, versus ligation  Will plan to use a homograft if available (option1), versus contralateral femoral vein (option2) which is not ideal given the possibility of severe right leg edema/DVT  Last option is an ilio-SFA bypass via obturator canal  Will plan tentatively for surgery Tuesday unless patient acutely decompensates as this will allow time to acquire homograft and get IV abx into her system  Type and screen  Bedrest with bathroom privileges only  Very high risk of limb loss or catastrophic bleeding - discussed with patient and  at bedside  She understands the associated risks  Will follow closely    Daniela Jose DO, FACS, FSVS, [address]

## 2022-11-04 NOTE — CONSULTS
Clinical Pharmacy Note  Vancomycin Consult    Rosa Elena Rodriguez is a 47 y.o. female ordered Vancomycin for Abscess; consult received from Dr. Dayanara Gonsalez to manage therapy. Also receiving cefepime. Allergies:  Pcn [penicillins]     Temp max:  Temp (24hrs), Av.5 °F (36.4 °C), Min:97.2 °F (36.2 °C), Max:97.7 °F (36.5 °C)      Recent Labs     22  0116   WBC 10.5       Recent Labs     22  0116   BUN 14   CREATININE 0.9       No intake or output data in the 24 hours ending 22 0600    Culture Results:      Ht Readings from Last 1 Encounters:   22 5' 2\" (1.575 m)        Wt Readings from Last 1 Encounters:   22 112 lb 14 oz (51.2 kg)         Estimated Creatinine Clearance: 58 mL/min (based on SCr of 0.9 mg/dL). Assessment/Plan:  Day # 1 of vancomycin. Vancomycin 750 mg IV every 12 hours ordered. Goal -600  Predicted     Thank you for the consult.    Nawaf Chun, PharmD

## 2022-11-04 NOTE — PROGRESS NOTES
Patient arrived to room 5254 from ED. Patient alert and oriented x4, ambulates well. IV infusing. No pressure ulcers noted. Areas of swelling noted to left groin and redness to right inner thigh. Patient states she takes gabapentin. When asked what dose patient takes, patient stated she doesn't have a prescription for it but gets pills from a friend who is prescribed the medication. Patient states she takes 2-3 pills at a time. Patient safe with call light in reach.

## 2022-11-04 NOTE — PROGRESS NOTES
Pt remained in bed all of shift only getting up to use bathroom. Pt continues to c/o severe pain to the LLE/groin. Area noted to have large hardened areas. Pulses + in LLE. Pt reports that Dr. Joaquin Wise stated he would adjust pain meds- call placed to Dr. Joaquin Wise who stated he did not state this nor will he make changes per Geovanna Bran in his office. Perfect serve to Dr. Winston Noel notifying him of pt's pain and requests for med adjustment and to add gabapentin. 1 time dose of gabapentin ordered. Dr. Winston Noel also notified of pt's elevated BP. Pt has an US IV in E that is noted to be a hard push with some slight leaking. In addition, lab was unable to draw afternoon labs d/t the condition of pt's veins. Dr. Winston Noel notified and order for PICC line received.    Electronically signed by Medford Frankel, RN on 11/4/22 at 5:00 PM EDT

## 2022-11-04 NOTE — ED NOTES
Lab called and requested them to come draw blood for cultures so patient can have IV antibiotics     Antonieta Packer RN  11/04/22 5646

## 2022-11-04 NOTE — ED PROVIDER NOTES
[address]      Pt Name: Mikhail Benavides  MRN: [id number]  [place] 1970  Date of evaluation: 11/3/2022  Provider: DELILAH Ramsey    This patient was seen and evaluated by the attending physician Dr. Aryan Maya. CHIEF COMPLAINT       Chief Complaint   Patient presents with    Abscess     Started 5 days ago left side of groin       CRITICAL CARE TIME   I performed a total Critical Care time of 15 minutes, excluding separately reportable procedures. There was a high probability of clinically significant/life threatening deterioration in the patient's condition which required my urgent intervention. Not limited to multiple reexaminations, discussions with attending physician and consultants. HISTORY OF PRESENT ILLNESS  (Location/Symptom, Timing/Onset, Context/Setting, Quality, Duration, Modifying Factors, Severity.)   Mikhail Benavides is a 47 y.o. female who presents to the emergency department complaining of swelling in her left groin that has been present for about a week. She tells me that she has been using this area to inject drugs. She tells me she injects cocaine IV and last used earlier today on her right leg. She has pain in the area that is 10 out of 10 she does not feel like it is getting worse but its not gotten better since she was seen in the emergency department a couple of days ago and left against medical advice prior to treatment. She denies fevers. Denies chest pain or feeling short of    Nursing Notes were reviewed and I agree. REVIEW OF SYSTEMS    (2-9 systems for level 4, 10 or more for level 5)     Review of Systems   Constitutional:  Negative for fever. Musculoskeletal:  Positive for arthralgias. Skin:  Positive for color change and wound. Negative for rash. Neurological:  Negative for weakness and numbness. Psychiatric/Behavioral:  Negative for agitation, behavioral problems and confusion.       Except as noted above the remainder of the review of systems was reviewed and negative. PAST MEDICAL HISTORY         Diagnosis Date    Hepatitis C without hepatic coma     Hx of blood clots     DEEP VEIN THROMBOSIS FROM IV DRUG USE    IV drug abuse ([facility])     None for 5 mos    Methadone use        SURGICAL HISTORY           Procedure Laterality Date    BREAST BIOPSY Right 2015    Excisional     SECTION      TUBAL LIGATION         CURRENT MEDICATIONS       Current Discharge Medication List        CONTINUE these medications which have NOT CHANGED    Details   ibuprofen (ADVIL;MOTRIN) 200 MG tablet Take 800 mg by mouth in the morning, at noon, in the evening, and at bedtime      acetaminophen (TYLENOL) 500 MG tablet Take 1,000-2,000 mg by mouth in the morning, at noon, in the evening, and at bedtime             ALLERGIES     Pcn [penicillins]    FAMILY HISTORY     No family history on file. No family status information on file. SOCIAL HISTORY      reports that she has been smoking cigarettes. She has a 20.00 pack-year smoking history. She has never used smokeless tobacco. She reports current alcohol use. She reports current drug use. Frequency: 1.00 time per week. Drugs: IV and Cocaine. PHYSICAL EXAM    (up to 7 for level 4, 8 or more for level 5)     ED Triage Vitals [22 0021]   BP Temp Temp Source Heart Rate Resp SpO2 Height Weight   (!) 166/103 97.2 °F (36.2 °C) Oral 97 18 100 % 5' 2\" (1.575 m) 111 lb 1.8 oz (50.4 kg)       Physical Exam  Vitals and nursing note reviewed. Constitutional:       Appearance: Normal appearance. HENT:      Head: Normocephalic and atraumatic. Mouth/Throat:      Mouth: Mucous membranes are moist.   Eyes:      Pupils: Pupils are equal, round, and reactive to light. Cardiovascular:      Rate and Rhythm: Normal rate. Pulses: Normal pulses. Pulmonary:      Effort: Pulmonary effort is normal. No respiratory distress. Abdominal:      Tenderness:  There is no abdominal tenderness. Musculoskeletal:         General: Normal range of motion. Cervical back: Normal range of motion. Skin:     General: Skin is warm. Findings: Erythema present. Neurological:      General: No focal deficit present. Mental Status: She is alert and oriented to person, place, and time. Psychiatric:         Mood and Affect: Mood normal.         Behavior: Behavior normal.       DIAGNOSTIC RESULTS     RADIOLOGY:   Non-plain film images such as CT, Ultrasound and MRI are read by the radiologist. Plain radiographic images are visualized and preliminarily interpreted by DELILAH Farooq with the below findings:    Reviewed radiologist's interpretation. Interpretation per the Radiologist below, if available at the time of this note:    CTA LOWER EXTREMITY LEFT W CONTRAST   Final Result   1. Large pseudoaneurysm of the distal left femoral artery likely correlating   to history of IV drug abuse. 2. There is interval thickening and inflammation along the femoral artery and   the proximal superficial femoral artery suggesting vasculitis in association   with above. 3. Cellulitis of the left groin with multiple reactive enlarged lymph nodes.                LABS:  Labs Reviewed   CBC WITH AUTO DIFFERENTIAL - Abnormal; Notable for the following components:       Result Value    RBC 3.93 (*)     Hemoglobin 11.1 (*)     Hematocrit 34.4 (*)     Platelets 901 (*)     All other components within normal limits   COMPREHENSIVE METABOLIC PANEL - Abnormal; Notable for the following components:    Glucose 116 (*)     Total Protein 9.0 (*)     Albumin/Globulin Ratio 0.8 (*)     All other components within normal limits   CBC WITH AUTO DIFFERENTIAL - Abnormal; Notable for the following components:    Hemoglobin 11.7 (*)     Platelets 395 (*)     All other components within normal limits    Narrative:     Collection has been rescheduled by [facility] at 11/04/2022 06:16 Reason:   Patient refuse venipuncture  Collection has been rescheduled by [address]  [facility] at 11/04/2022 08:14 Reason:   Patient refuse venipuncture   URINE DRUG SCREEN - Abnormal; Notable for the following components:    Amphetamine Screen, Urine POSITIVE (*)     Cocaine Metabolite Screen, Urine POSITIVE (*)     All other components within normal limits   CULTURE, BLOOD 2   CULTURE, BLOOD 1   LACTIC ACID   RENAL FUNCTION PANEL    Narrative:     Collection has been rescheduled by [facility] at 11/04/2022 06:16 Reason:   Patient refuse venipuncture  Collection has been rescheduled by [facility] at 11/04/2022 08:14 Reason:   Patient refuse venipuncture   MAGNESIUM    Narrative:     Collection has been rescheduled by [facility] at 11/04/2022 06:16 Reason:   Patient refuse venipuncture  Collection has been rescheduled by [facility]  [place] [address] at 11/04/2022 08:14 Reason:   Patient refuse venipuncture   URINALYSIS WITH REFLEX TO CULTURE   TYPE AND SCREEN       All other labs were within normal range or not returned as of this dictation. EMERGENCY DEPARTMENT COURSE and DIFFERENTIAL DIAGNOSIS/MDM:   Vitals:    Vitals:    11/04/22 0917 11/04/22 1100 11/04/22 1539 11/04/22 1606   BP:  (!) 159/72  (!) 176/102   Pulse:    83   Resp: 18 16 16 17   Temp:  97.9 °F (36.6 °C)  97.5 °F (36.4 °C)   TempSrc:  Oral  Oral   SpO2:  93%  96%   Weight:       Height:         Is swelling in the left groin at site of IV drug injection. She tells me she is been injecting cocaine this area for \"a long time. \"  However she tells me for about a week she is noticed the swelling and is concerned for abscess. At the end of my shift she had a normal white blood cell count lactic acid and no fever but CT scan imaging was pending entirety the patient's care was transferred to the attending physician. CONSULTS:  IP CONSULT TO INFECTIOUS DISEASES  IP CONSULT TO VASCULAR SURGERY  PHARMACY TO DOSE VANCOMYCIN    PROCEDURES:  Procedures      FINAL IMPRESSION      1.  Cellulitis, unspecified cellulitis site DISPOSITION/PLAN   DISPOSITION Admitted 11/04/2022 04:13:20 AM      PATIENT REFERRED TO:  No follow-up provider specified.     DISCHARGE MEDICATIONS:  Current Discharge Medication List          (Please note that portions of this note were completed with a voice recognition program.  Efforts were made to edit the dictations but occasionally words are mis-transcribed.)    DELILAH [place]  11/04/22 5077

## 2022-11-05 LAB
ABO/RH: NORMAL
ALBUMIN SERPL-MCNC: 3.6 G/DL (ref 3.4–5)
AMPHETAMINE SCREEN, URINE: POSITIVE
ANION GAP SERPL CALCULATED.3IONS-SCNC: 7 MMOL/L (ref 3–16)
ANTIBODY IDENTIFICATION: NORMAL
ANTIBODY SCREEN: NORMAL
BARBITURATE SCREEN URINE: ABNORMAL
BASOPHILS ABSOLUTE: 0 K/UL (ref 0–0.2)
BASOPHILS RELATIVE PERCENT: 0.5 %
BENZODIAZEPINE SCREEN, URINE: ABNORMAL
BUN BLDV-MCNC: 9 MG/DL (ref 7–20)
C (LITTLE) ANTIGEN: NORMAL
C-REACTIVE PROTEIN: 8.8 MG/L (ref 0–5.1)
CALCIUM SERPL-MCNC: 9.2 MG/DL (ref 8.3–10.6)
CANNABINOID SCREEN URINE: ABNORMAL
CHLORIDE BLD-SCNC: 96 MMOL/L (ref 99–110)
CO2: 29 MMOL/L (ref 21–32)
COCAINE METABOLITE SCREEN URINE: POSITIVE
CREAT SERPL-MCNC: 0.9 MG/DL (ref 0.6–1.1)
DAT IGG CAPTURE: NORMAL
EOSINOPHILS ABSOLUTE: 0.2 K/UL (ref 0–0.6)
EOSINOPHILS RELATIVE PERCENT: 2.7 %
FENTANYL SCREEN, URINE: ABNORMAL
GFR SERPL CREATININE-BSD FRML MDRD: >60 ML/MIN/{1.73_M2}
GLUCOSE BLD-MCNC: 141 MG/DL (ref 70–99)
HCG(URINE) PREGNANCY TEST: NEGATIVE
HCT VFR BLD CALC: 32.8 % (ref 36–48)
HEMOGLOBIN: 10.7 G/DL (ref 12–16)
HIV AG/AB: NORMAL
HIV ANTIGEN: NORMAL
HIV-1 ANTIBODY: NORMAL
HIV-2 AB: NORMAL
LYMPHOCYTES ABSOLUTE: 1.4 K/UL (ref 1–5.1)
LYMPHOCYTES RELATIVE PERCENT: 18.3 %
Lab: ABNORMAL
MAGNESIUM: 1.9 MG/DL (ref 1.8–2.4)
MCH RBC QN AUTO: 28.3 PG (ref 26–34)
MCHC RBC AUTO-ENTMCNC: 32.6 G/DL (ref 31–36)
MCV RBC AUTO: 86.9 FL (ref 80–100)
METHADONE SCREEN, URINE: ABNORMAL
MONOCYTES ABSOLUTE: 0.6 K/UL (ref 0–1.3)
MONOCYTES RELATIVE PERCENT: 7.9 %
NEUTROPHILS ABSOLUTE: 5.3 K/UL (ref 1.7–7.7)
NEUTROPHILS RELATIVE PERCENT: 70.6 %
OPIATE SCREEN URINE: ABNORMAL
OXYCODONE URINE: POSITIVE
PDW BLD-RTO: 13.5 % (ref 12.4–15.4)
PH UA: 6.5
PHENCYCLIDINE SCREEN URINE: ABNORMAL
PHOSPHORUS: 3.5 MG/DL (ref 2.5–4.9)
PLATELET # BLD: 484 K/UL (ref 135–450)
PMV BLD AUTO: 6.8 FL (ref 5–10.5)
POTASSIUM SERPL-SCNC: 4.3 MMOL/L (ref 3.5–5.1)
RBC # BLD: 3.77 M/UL (ref 4–5.2)
SEDIMENTATION RATE, ERYTHROCYTE: 57 MM/HR (ref 0–30)
SODIUM BLD-SCNC: 132 MMOL/L (ref 136–145)
VANCOMYCIN TROUGH: 25.5 UG/ML (ref 10–20)
VANCOMYCIN TROUGH: 8.8 UG/ML (ref 10–20)
WBC # BLD: 7.5 K/UL (ref 4–11)

## 2022-11-05 PROCEDURE — 6370000000 HC RX 637 (ALT 250 FOR IP): Performed by: STUDENT IN AN ORGANIZED HEALTH CARE EDUCATION/TRAINING PROGRAM

## 2022-11-05 PROCEDURE — 2580000003 HC RX 258: Performed by: STUDENT IN AN ORGANIZED HEALTH CARE EDUCATION/TRAINING PROGRAM

## 2022-11-05 PROCEDURE — 85025 COMPLETE CBC W/AUTO DIFF WBC: CPT

## 2022-11-05 PROCEDURE — 85652 RBC SED RATE AUTOMATED: CPT

## 2022-11-05 PROCEDURE — 83735 ASSAY OF MAGNESIUM: CPT

## 2022-11-05 PROCEDURE — 36592 COLLECT BLOOD FROM PICC: CPT

## 2022-11-05 PROCEDURE — 80069 RENAL FUNCTION PANEL: CPT

## 2022-11-05 PROCEDURE — 86140 C-REACTIVE PROTEIN: CPT

## 2022-11-05 PROCEDURE — 87390 HIV-1 AG IA: CPT

## 2022-11-05 PROCEDURE — 80202 ASSAY OF VANCOMYCIN: CPT

## 2022-11-05 PROCEDURE — 86701 HIV-1ANTIBODY: CPT

## 2022-11-05 PROCEDURE — 6370000000 HC RX 637 (ALT 250 FOR IP): Performed by: INTERNAL MEDICINE

## 2022-11-05 PROCEDURE — 99231 SBSQ HOSP IP/OBS SF/LOW 25: CPT | Performed by: STUDENT IN AN ORGANIZED HEALTH CARE EDUCATION/TRAINING PROGRAM

## 2022-11-05 PROCEDURE — 84703 CHORIONIC GONADOTROPIN ASSAY: CPT

## 2022-11-05 PROCEDURE — 1200000000 HC SEMI PRIVATE

## 2022-11-05 PROCEDURE — 86702 HIV-2 ANTIBODY: CPT

## 2022-11-05 PROCEDURE — 6360000002 HC RX W HCPCS: Performed by: STUDENT IN AN ORGANIZED HEALTH CARE EDUCATION/TRAINING PROGRAM

## 2022-11-05 PROCEDURE — 80307 DRUG TEST PRSMV CHEM ANLYZR: CPT

## 2022-11-05 RX ADMIN — OXYCODONE HYDROCHLORIDE 10 MG: 10 TABLET ORAL at 20:21

## 2022-11-05 RX ADMIN — VANCOMYCIN HYDROCHLORIDE 750 MG: 750 INJECTION, POWDER, LYOPHILIZED, FOR SOLUTION INTRAVENOUS at 08:58

## 2022-11-05 RX ADMIN — OXYCODONE HYDROCHLORIDE 10 MG: 10 TABLET ORAL at 15:21

## 2022-11-05 RX ADMIN — VANCOMYCIN HYDROCHLORIDE 750 MG: 750 INJECTION, POWDER, LYOPHILIZED, FOR SOLUTION INTRAVENOUS at 21:59

## 2022-11-05 RX ADMIN — ENOXAPARIN SODIUM 30 MG: 100 INJECTION SUBCUTANEOUS at 08:54

## 2022-11-05 RX ADMIN — OXYCODONE HYDROCHLORIDE 10 MG: 10 TABLET ORAL at 11:25

## 2022-11-05 RX ADMIN — OXYCODONE HYDROCHLORIDE 10 MG: 10 TABLET ORAL at 01:35

## 2022-11-05 RX ADMIN — OXYCODONE HYDROCHLORIDE 10 MG: 10 TABLET ORAL at 06:30

## 2022-11-05 RX ADMIN — ACETAMINOPHEN 650 MG: 325 TABLET ORAL at 04:51

## 2022-11-05 RX ADMIN — CEFEPIME 2000 MG: 2 INJECTION, POWDER, FOR SOLUTION INTRAVENOUS at 03:06

## 2022-11-05 RX ADMIN — CEFEPIME 2000 MG: 2 INJECTION, POWDER, FOR SOLUTION INTRAVENOUS at 15:20

## 2022-11-05 RX ADMIN — SODIUM CHLORIDE, PRESERVATIVE FREE 10 ML: 5 INJECTION INTRAVENOUS at 08:59

## 2022-11-05 ASSESSMENT — PAIN DESCRIPTION - DESCRIPTORS
DESCRIPTORS: DISCOMFORT;ACHING
DESCRIPTORS: PRESSURE;ACHING
DESCRIPTORS: ACHING;DISCOMFORT
DESCRIPTORS: ACHING;THROBBING
DESCRIPTORS: ACHING;DISCOMFORT;PRESSURE
DESCRIPTORS: ACHING;DISCOMFORT;PRESSURE

## 2022-11-05 ASSESSMENT — PAIN DESCRIPTION - ORIENTATION
ORIENTATION: LEFT
ORIENTATION: RIGHT;LEFT
ORIENTATION: LEFT;RIGHT
ORIENTATION: LEFT;RIGHT
ORIENTATION: LEFT

## 2022-11-05 ASSESSMENT — PAIN DESCRIPTION - FREQUENCY: FREQUENCY: CONTINUOUS

## 2022-11-05 ASSESSMENT — PAIN DESCRIPTION - LOCATION
LOCATION: GROIN
LOCATION: GROIN;LEG
LOCATION: GROIN

## 2022-11-05 ASSESSMENT — PAIN SCALES - GENERAL
PAINLEVEL_OUTOF10: 9
PAINLEVEL_OUTOF10: 10
PAINLEVEL_OUTOF10: 10
PAINLEVEL_OUTOF10: 9
PAINLEVEL_OUTOF10: 10
PAINLEVEL_OUTOF10: 2
PAINLEVEL_OUTOF10: 10

## 2022-11-05 ASSESSMENT — PAIN - FUNCTIONAL ASSESSMENT
PAIN_FUNCTIONAL_ASSESSMENT: ACTIVITIES ARE NOT PREVENTED
PAIN_FUNCTIONAL_ASSESSMENT: PREVENTS OR INTERFERES SOME ACTIVE ACTIVITIES AND ADLS

## 2022-11-05 NOTE — PLAN OF CARE
Problem: Discharge Planning  Goal: Discharge to home or other facility with appropriate resources  11/4/2022 2104 by Florin Bain, RN  Outcome: Liana Lanre (Taken 11/4/2022 2013)  Discharge to home or other facility with appropriate resources: Identify barriers to discharge with patient and caregiver         Problem: Safety - Adult  Goal: Free from fall injury  11/4/2022 2104 by Florin Bain, RN  Outcome: Progressing

## 2022-11-05 NOTE — PROGRESS NOTES
Pt continues with enlarged area to L groin however it appears slightly smaller. Pt has been using ice on area throughout shift. Pt continues to c/o pain in area- PRN Oxy administered as ordered.    Electronically signed by Kamilah Velazquez RN on 11/5/22 at 7:48 PM EDT

## 2022-11-05 NOTE — PROGRESS NOTES
Hospitalist Progress Note      PCP: No primary care provider on file. Date of Admission: 11/4/2022        Subjective: Still having pain in her left groin, no fever chills nausea or vomiting. Nurse at bedside      Medications:  Reviewed    Infusion Medications    sodium chloride 5 mL/hr (11/04/22 2010)     Scheduled Medications    sodium chloride flush  5-40 mL IntraVENous 2 times per day    enoxaparin  30 mg SubCUTAneous Daily    cefepime  2,000 mg IntraVENous Q12H    vancomycin (VANCOCIN) intermittent dosing (placeholder)   Other RX Placeholder    vancomycin  750 mg IntraVENous Q12H    nicotine  1 patch TransDERmal Daily    lidocaine 1 % injection  5 mL IntraDERmal Once     PRN Meds: sodium chloride flush, sodium chloride, polyethylene glycol, acetaminophen **OR** acetaminophen, ondansetron, oxyCODONE **OR** oxyCODONE    No intake or output data in the 24 hours ending 11/05/22 0956    Physical Exam Performed:    BP (!) 177/104   Pulse (!) 102   Temp 98.3 °F (36.8 °C) (Oral)   Resp 20   Ht 5' 2\" (1.575 m)   Wt 115 lb 4.8 oz (52.3 kg)   SpO2 98%   BMI 21.09 kg/m²     General appearance: No apparent distress  Neck: Supple  Respiratory:  Normal respiratory effort. Clear to auscultation, bilaterally without Rales/Wheezes/Rhonchi. Cardiovascular: Regular rate and rhythm with normal S1/S2 without murmurs, rubs or gallops. Abdomen: Soft, non-tender, non-distended  Musculoskeletal: No clubbing, cyanosis  Skin: Skin color, texture, turgor normal.  No rashes or lesions.   Neurologic: Focal weakness  Psychiatric: Alert and oriented  Capillary Refill: Brisk, 3 seconds, normal   Peripheral Pulses: +2 palpable, equal bilaterally   Groin Examined after getting permission from the patient and in the presence of her nurse, less tender      Labs:   Recent Labs     11/04/22  0116 11/04/22  1150   WBC 10.5 8.3   HGB 11.1* 11.7*   HCT 34.4* 36.3   * 580*     Recent Labs     11/04/22  0116 11/04/22  1151    138   K 3.8 4.9   CL 99 101   CO2 23 27   BUN 14 11   CREATININE 0.9 0.7   CALCIUM 9.9 9.4   PHOS  --  3.6     Recent Labs     11/04/22  0116   AST 21   ALT 13   BILITOT <0.2   ALKPHOS 113     No results for input(s): INR in the last 72 hours. No results for input(s): [place] in the last 72 hours. Urinalysis:      Lab Results   Component Value Date/Time    NITRU Negative 01/01/2019 09:39 PM    BLOODU Negative 01/01/2019 09:39 PM    SPECGRAV >=1.030 01/01/2019 09:39 PM    GLUCOSEU Negative 01/01/2019 09:39 PM       Radiology:  CTA LOWER EXTREMITY LEFT W CONTRAST   Final Result   1. Large pseudoaneurysm of the distal left femoral artery likely correlating   to history of IV drug abuse. 2. There is interval thickening and inflammation along the femoral artery and   the proximal superficial femoral artery suggesting vasculitis in association   with above. 3. Cellulitis of the left groin with multiple reactive enlarged lymph nodes. Assessment/Plan:    Active Hospital Problems    Diagnosis     Cellulitis [L03.90]      Priority: Medium    Pseudoaneurysm ([address] [I72.9]      Priority: Medium    Pseudoaneurysm of femoral artery ([facility]) [I72.4]      Priority: Medium     Cellulitis and possible abscess of her left groin, IV antibiotics, vascular surgery consulted, continue vancomycin and cefepime for now, plan for surgical intervention Tuesday morning  Large pseudoaneurysm of the distal left femoral artery likely correlating with history of IV drug abuse, vascular surgery consulted, potential surgical intervention, plan for surgery Tuesday morning   urine positive for cocaine and amphetamine, IV drug use, counseled  IV drug use counseled      Diet: ADULT DIET;  Regular  Code Status: Full Code        Ozzy Resendez MD

## 2022-11-05 NOTE — PROGRESS NOTES
[facility] ([facility]) Vascular Surgery Progress Note      Chief Complaint:   Chief Complaint   Patient presents with    Abscess     Started 5 days ago left side of groin        : left femoral PSA    Brandon Staples is a 47 y.o. female patient. Subjective  No acute events  Stable pain and lateral thigh discomfort  No foot or calf symptoms    1. Cellulitis, unspecified cellulitis site      Past Medical History:   Diagnosis Date    Hepatitis C without hepatic coma     Hx of blood clots 2007    DEEP VEIN THROMBOSIS FROM IV DRUG USE    IV drug abuse ([facility] [address]     None for 5 mos    Methadone use      No past surgical history pertinent negatives on file. Scheduled Meds:   sodium chloride flush  5-40 mL IntraVENous 2 times per day    enoxaparin  30 mg SubCUTAneous Daily    cefepime  2,000 mg IntraVENous Q12H    vancomycin (VANCOCIN) intermittent dosing (placeholder)   Other RX Placeholder    vancomycin  750 mg IntraVENous Q12H    nicotine  1 patch TransDERmal Daily    lidocaine 1 % injection  5 mL IntraDERmal Once     Continuous Infusions:   sodium chloride 5 mL/hr (11/04/22 2010)     PRN Meds:sodium chloride flush, sodium chloride, polyethylene glycol, acetaminophen **OR** acetaminophen, ondansetron, oxyCODONE **OR** oxyCODONE    Active Problems:    Cellulitis    Pseudoaneurysm (HCC)    Pseudoaneurysm of femoral artery (HCC)  Resolved Problems:    * No resolved hospital problems. *    Blood pressure (!) 177/104, pulse (!) 102, temperature 98.3 °F (36.8 °C), temperature source Oral, resp. rate 20, height 5' 2\" (1.575 m), weight 115 lb 4.8 oz (52.3 kg), SpO2 98 %, not currently breastfeeding. Objective:  Vital signs (most recent): Blood pressure (!) 177/104, pulse (!) 102, temperature 98.3 °F (36.8 °C), temperature source Oral, resp. rate 20, height 5' 2\" (1.575 m), weight 115 lb 4.8 oz (52.3 kg), SpO2 98 %, not currently breastfeeding. General appearance: In pain. Lungs:  Normal effort. Heart: Normal rate. Extremities: There is normal range of motion. (Palpable tender left femoral pulsation).     Pulse exam: palp DP bilaterally    Assessment & Plan    No acute events overnight  HIV screen  Homograft for surgery Tuesday has been ordered  Planning surgery Tuesday morning  Pain medication added for comfort   Discussed and reiterated high risk for limb loss and wound complications  Continue bedrest with bathroom privelages      Zach Drain, DO, FACS, FSVS, [address] [facility] Vascular and Endovascular Surgery

## 2022-11-06 LAB
ALBUMIN SERPL-MCNC: 3.4 G/DL (ref 3.4–5)
ANION GAP SERPL CALCULATED.3IONS-SCNC: 8 MMOL/L (ref 3–16)
BASOPHILS ABSOLUTE: 0 K/UL (ref 0–0.2)
BASOPHILS RELATIVE PERCENT: 0.6 %
BUN BLDV-MCNC: 10 MG/DL (ref 7–20)
CALCIUM SERPL-MCNC: 8.7 MG/DL (ref 8.3–10.6)
CHLORIDE BLD-SCNC: 96 MMOL/L (ref 99–110)
CO2: 26 MMOL/L (ref 21–32)
CREAT SERPL-MCNC: 0.6 MG/DL (ref 0.6–1.1)
EOSINOPHILS ABSOLUTE: 0.1 K/UL (ref 0–0.6)
EOSINOPHILS RELATIVE PERCENT: 1.9 %
GFR SERPL CREATININE-BSD FRML MDRD: >60 ML/MIN/{1.73_M2}
GLUCOSE BLD-MCNC: 152 MG/DL (ref 70–99)
HCT VFR BLD CALC: 32.3 % (ref 36–48)
HEMOGLOBIN: 10.7 G/DL (ref 12–16)
LYMPHOCYTES ABSOLUTE: 0.9 K/UL (ref 1–5.1)
LYMPHOCYTES RELATIVE PERCENT: 11.3 %
MAGNESIUM: 1.8 MG/DL (ref 1.8–2.4)
MCH RBC QN AUTO: 27.7 PG (ref 26–34)
MCHC RBC AUTO-ENTMCNC: 33 G/DL (ref 31–36)
MCV RBC AUTO: 84 FL (ref 80–100)
MONOCYTES ABSOLUTE: 0.6 K/UL (ref 0–1.3)
MONOCYTES RELATIVE PERCENT: 7.8 %
NEUTROPHILS ABSOLUTE: 6 K/UL (ref 1.7–7.7)
NEUTROPHILS RELATIVE PERCENT: 78.4 %
PDW BLD-RTO: 14 % (ref 12.4–15.4)
PHOSPHORUS: 2.5 MG/DL (ref 2.5–4.9)
PLATELET # BLD: 466 K/UL (ref 135–450)
PMV BLD AUTO: 7.2 FL (ref 5–10.5)
POTASSIUM SERPL-SCNC: 4.2 MMOL/L (ref 3.5–5.1)
RBC # BLD: 3.84 M/UL (ref 4–5.2)
SODIUM BLD-SCNC: 130 MMOL/L (ref 136–145)
VANCOMYCIN TROUGH: 11.6 UG/ML (ref 10–20)
WBC # BLD: 7.6 K/UL (ref 4–11)

## 2022-11-06 PROCEDURE — 85025 COMPLETE CBC W/AUTO DIFF WBC: CPT

## 2022-11-06 PROCEDURE — 6360000002 HC RX W HCPCS: Performed by: INTERNAL MEDICINE

## 2022-11-06 PROCEDURE — 6370000000 HC RX 637 (ALT 250 FOR IP): Performed by: STUDENT IN AN ORGANIZED HEALTH CARE EDUCATION/TRAINING PROGRAM

## 2022-11-06 PROCEDURE — 1200000000 HC SEMI PRIVATE

## 2022-11-06 PROCEDURE — 6360000002 HC RX W HCPCS: Performed by: STUDENT IN AN ORGANIZED HEALTH CARE EDUCATION/TRAINING PROGRAM

## 2022-11-06 PROCEDURE — 80069 RENAL FUNCTION PANEL: CPT

## 2022-11-06 PROCEDURE — 2580000003 HC RX 258: Performed by: INTERNAL MEDICINE

## 2022-11-06 PROCEDURE — 80202 ASSAY OF VANCOMYCIN: CPT

## 2022-11-06 PROCEDURE — 2580000003 HC RX 258: Performed by: STUDENT IN AN ORGANIZED HEALTH CARE EDUCATION/TRAINING PROGRAM

## 2022-11-06 PROCEDURE — 6370000000 HC RX 637 (ALT 250 FOR IP): Performed by: INTERNAL MEDICINE

## 2022-11-06 PROCEDURE — 83735 ASSAY OF MAGNESIUM: CPT

## 2022-11-06 PROCEDURE — 99231 SBSQ HOSP IP/OBS SF/LOW 25: CPT | Performed by: STUDENT IN AN ORGANIZED HEALTH CARE EDUCATION/TRAINING PROGRAM

## 2022-11-06 RX ORDER — CALCIUM CARBONATE 200(500)MG
500 TABLET,CHEWABLE ORAL 3 TIMES DAILY PRN
Status: DISCONTINUED | OUTPATIENT
Start: 2022-11-06 | End: 2022-11-14 | Stop reason: HOSPADM

## 2022-11-06 RX ADMIN — POLYETHYLENE GLYCOL 3350 17 G: 17 POWDER, FOR SOLUTION ORAL at 08:54

## 2022-11-06 RX ADMIN — SODIUM CHLORIDE: 9 INJECTION, SOLUTION INTRAVENOUS at 18:38

## 2022-11-06 RX ADMIN — VANCOMYCIN HYDROCHLORIDE 1000 MG: 1 INJECTION, POWDER, LYOPHILIZED, FOR SOLUTION INTRAVENOUS at 22:29

## 2022-11-06 RX ADMIN — CEFEPIME 2000 MG: 2 INJECTION, POWDER, FOR SOLUTION INTRAVENOUS at 16:04

## 2022-11-06 RX ADMIN — OXYCODONE HYDROCHLORIDE 10 MG: 10 TABLET ORAL at 00:34

## 2022-11-06 RX ADMIN — OXYCODONE HYDROCHLORIDE 10 MG: 10 TABLET ORAL at 08:54

## 2022-11-06 RX ADMIN — ENOXAPARIN SODIUM 30 MG: 100 INJECTION SUBCUTANEOUS at 08:56

## 2022-11-06 RX ADMIN — VANCOMYCIN HYDROCHLORIDE 1000 MG: 1 INJECTION, POWDER, LYOPHILIZED, FOR SOLUTION INTRAVENOUS at 09:06

## 2022-11-06 RX ADMIN — OXYCODONE HYDROCHLORIDE 10 MG: 10 TABLET ORAL at 13:01

## 2022-11-06 RX ADMIN — OXYCODONE HYDROCHLORIDE 10 MG: 10 TABLET ORAL at 18:35

## 2022-11-06 RX ADMIN — CEFEPIME 2000 MG: 2 INJECTION, POWDER, FOR SOLUTION INTRAVENOUS at 02:24

## 2022-11-06 RX ADMIN — OXYCODONE HYDROCHLORIDE 10 MG: 10 TABLET ORAL at 04:32

## 2022-11-06 RX ADMIN — OXYCODONE HYDROCHLORIDE 10 MG: 10 TABLET ORAL at 22:29

## 2022-11-06 ASSESSMENT — PAIN DESCRIPTION - DESCRIPTORS
DESCRIPTORS: PRESSURE
DESCRIPTORS: PRESSURE;SHARP
DESCRIPTORS: PRESSURE
DESCRIPTORS: SHARP;PRESSURE
DESCRIPTORS: PRESSURE
DESCRIPTORS: SHARP;PRESSURE

## 2022-11-06 ASSESSMENT — PAIN DESCRIPTION - LOCATION
LOCATION: GROIN

## 2022-11-06 ASSESSMENT — PAIN SCALES - GENERAL
PAINLEVEL_OUTOF10: 9
PAINLEVEL_OUTOF10: 8
PAINLEVEL_OUTOF10: 7
PAINLEVEL_OUTOF10: 3
PAINLEVEL_OUTOF10: 8
PAINLEVEL_OUTOF10: 9
PAINLEVEL_OUTOF10: 7
PAINLEVEL_OUTOF10: 8

## 2022-11-06 ASSESSMENT — PAIN - FUNCTIONAL ASSESSMENT: PAIN_FUNCTIONAL_ASSESSMENT: ACTIVITIES ARE NOT PREVENTED

## 2022-11-06 ASSESSMENT — PAIN DESCRIPTION - ORIENTATION
ORIENTATION: LEFT

## 2022-11-06 ASSESSMENT — PAIN DESCRIPTION - FREQUENCY: FREQUENCY: CONTINUOUS

## 2022-11-06 ASSESSMENT — PAIN SCALES - WONG BAKER: WONGBAKER_NUMERICALRESPONSE: 2

## 2022-11-06 NOTE — PLAN OF CARE
Problem: Discharge Planning  Goal: Discharge to home or other facility with appropriate resources  Outcome: Progressing     Problem: Safety - Adult  Goal: Free from fall injury  Outcome: Progressing     Problem: Pain  Goal: Verbalizes/displays adequate comfort level or baseline comfort level  Outcome: Progressing [Follow-Up - Clinic] : a clinic follow-up of [Hypertension] : hypertension [Syncope] : syncope

## 2022-11-06 NOTE — PROGRESS NOTES
[facility] ([facility]) Vascular Surgery Progress Note      Chief Complaint:   Chief Complaint   Patient presents with    Abscess     Started 5 days ago left side of groin        : left femoral PSA    Kait Rosales is a 47 y.o. female patient. Subjective  No acute events  Stable pain states swelling feels less    1. Cellulitis, unspecified cellulitis site      Past Medical History:   Diagnosis Date    Hepatitis C without hepatic coma     Hx of blood clots 2007    DEEP VEIN THROMBOSIS FROM IV DRUG USE    IV drug abuse ([address]     None for 5 mos    Methadone use      No past surgical history pertinent negatives on file. Scheduled Meds:   vancomycin  1,000 mg IntraVENous Q12H    sodium chloride flush  5-40 mL IntraVENous 2 times per day    enoxaparin  30 mg SubCUTAneous Daily    cefepime  2,000 mg IntraVENous Q12H    vancomycin (VANCOCIN) intermittent dosing (placeholder)   Other RX Placeholder    nicotine  1 patch TransDERmal Daily    lidocaine 1 % injection  5 mL IntraDERmal Once     Continuous Infusions:   sodium chloride Stopped (11/06/22 0906)     PRN Meds:sodium chloride flush, sodium chloride, polyethylene glycol, acetaminophen **OR** acetaminophen, ondansetron, oxyCODONE **OR** oxyCODONE    Active Problems:    Cellulitis    Pseudoaneurysm (HCC)    Pseudoaneurysm of femoral artery (HCC)  Resolved Problems:    * No resolved hospital problems. *    Blood pressure (!) 138/97, pulse (!) 101, temperature 97.7 °F (36.5 °C), temperature source Oral, resp. rate 20, height 5' 2\" (1.575 m), weight 109 lb 2 oz (49.5 kg), SpO2 97 %, not currently breastfeeding. Objective:  Vital signs (most recent): Blood pressure (!) 138/97, pulse (!) 101, temperature 97.7 °F (36.5 °C), temperature source Oral, resp. rate 20, height 5' 2\" (1.575 m), weight 109 lb 2 oz (49.5 kg), SpO2 97 %, not currently breastfeeding. General appearance: In pain. Lungs:  Normal effort. Heart: Normal rate. Extremities:  There is normal range of motion. (Palpable tender left femoral pulsation).     Pulse exam: palp DP bilaterally    Assessment & Plan    HIV negative  Homograft ordered for surgery Tuesday   Reiterated high risk for limb loss and wound complications  Continue bedrest      Tristan Kimbrough, DO, FACS, FSVS, [address] [facility] Vascular and Endovascular Surgery

## 2022-11-06 NOTE — PROGRESS NOTES
Clinical Pharmacy Note  Vancomycin Consult    Hemalatha Hardy is a 47 y.o. female ordered Vancomycin for abscess; consult received from Dr. Sandy Macias to manage therapy. Also receiving cefepime. Allergies:  Pcn [penicillins]     Temp max:  Temp (24hrs), Av.4 °F (36.9 °C), Min:97.5 °F (36.4 °C), Max:99.2 °F (37.3 °C)      Recent Labs     22  1150 22  1255 22  0530   WBC 8.3 7.5 7.6         Recent Labs     22  1151 22  1255 22  0530   BUN 11 9 10   CREATININE 0.7 0.9 0.6           Intake/Output Summary (Last 24 hours) at 2022 1446  Last data filed at 2022 2038  Gross per 24 hour   Intake 480 ml   Output 0 ml   Net 480 ml         Culture Results:  pending    Ht Readings from Last 1 Encounters:   22 5' 2\" (1.575 m)        Wt Readings from Last 1 Encounters:   22 109 lb 2 oz (49.5 kg)         Estimated Creatinine Clearance: 87 mL/min (based on SCr of 0.6 mg/dL). Assessment:  Day # 3 of Vancomycin. Current regimen: 750 mg every 12 hours  Vancomycin level: 11.6 mg/L  Predicted AUC: 376    Plan:  Increase Vancomycin to 1,000 mg IVPB Q12H   Adjusted regimen predicts a Vancomycin trough of 12.6 ug/mL and AUC of 501    Thank you for the consult. Will continue to follow.     LESLIE Carpio [facility], PharmD, BCPS  2022 6:49 AM

## 2022-11-06 NOTE — PLAN OF CARE
Problem: Discharge Planning  Goal: Discharge to home or other facility with appropriate resources  Outcome: Progressing  Flowsheets (Taken 11/5/2022 2031)  Discharge to home or other facility with appropriate resources: Identify barriers to discharge with patient and caregiver     Problem: Safety - Adult  Goal: Free from fall injury  Outcome: Progressing     Problem: Pain  Goal: Verbalizes/displays adequate comfort level or baseline comfort level  Outcome: Progressing

## 2022-11-06 NOTE — PROGRESS NOTES
Hospitalist Progress Note      PCP: No primary care provider on file. Date of Admission: 11/4/2022        Subjective: Pain is better, no fever chills nausea or vomiting. No hallucination      Medications:  Reviewed    Infusion Medications    sodium chloride 5 mL/hr (11/04/22 2010)     Scheduled Medications    vancomycin  1,000 mg IntraVENous Q12H    sodium chloride flush  5-40 mL IntraVENous 2 times per day    enoxaparin  30 mg SubCUTAneous Daily    cefepime  2,000 mg IntraVENous Q12H    vancomycin (VANCOCIN) intermittent dosing (placeholder)   Other RX Placeholder    nicotine  1 patch TransDERmal Daily    lidocaine 1 % injection  5 mL IntraDERmal Once     PRN Meds: sodium chloride flush, sodium chloride, polyethylene glycol, acetaminophen **OR** acetaminophen, ondansetron, oxyCODONE **OR** oxyCODONE      Intake/Output Summary (Last 24 hours) at 11/6/2022 0859  Last data filed at 11/5/2022 2038  Gross per 24 hour   Intake 480 ml   Output 0 ml   Net 480 ml       Physical Exam Performed:    BP (!) 138/97   Pulse (!) 101   Temp 97.7 °F (36.5 °C) (Oral)   Resp 20   Ht 5' 2\" (1.575 m)   Wt 109 lb 2 oz (49.5 kg)   SpO2 97%   BMI 19.96 kg/m²     General appearance: No apparent distress  Neck: Supple  Respiratory:  Normal respiratory effort. Clear to auscultation, bilaterally without Rales/Wheezes/Rhonchi. Cardiovascular: Regular rate and rhythm with normal S1/S2 without murmurs, rubs or gallops. Abdomen: Soft, non-tender  Musculoskeletal: No clubbing, cyanosis  Skin: Skin color, texture, turgor normal.  No rashes or lesions. Neurologic:  No focal weakness.   Psychiatric: Alert and oriented  Capillary Refill: Brisk, 3 seconds, normal   Peripheral Pulses: +2 palpable, equal bilaterally       Labs:   Recent Labs     11/04/22  1150 11/05/22  1255 11/06/22  0530   WBC 8.3 7.5 7.6   HGB 11.7* 10.7* 10.7*   HCT 36.3 32.8* 32.3*   * 484* 466*     Recent Labs     11/04/22  1151 11/05/22  1255 11/06/22  0530    132* 130*   K 4.9 4.3 4.2    96* 96*   CO2 27 29 26   BUN 11 9 10   CREATININE 0.7 0.9 0.6   CALCIUM 9.4 9.2 8.7   PHOS 3.6 3.5 2.5     Recent Labs     11/04/22  0116   AST 21   ALT 13   BILITOT <0.2   ALKPHOS 113     No results for input(s): INR in the last 72 hours. No results for input(s): [place] in the last 72 hours. Urinalysis:      Lab Results   Component Value Date/Time    NITRU Negative 01/01/2019 09:39 PM    BLOODU Negative 01/01/2019 09:39 PM    SPECGRAV >=1.030 01/01/2019 09:39 PM    GLUCOSEU Negative 01/01/2019 09:39 PM       Radiology:  CTA LOWER EXTREMITY LEFT W CONTRAST   Final Result   1. Large pseudoaneurysm of the distal left femoral artery likely correlating   to history of IV drug abuse. 2. There is interval thickening and inflammation along the femoral artery and   the proximal superficial femoral artery suggesting vasculitis in association   with above. 3. Cellulitis of the left groin with multiple reactive enlarged lymph nodes. Assessment/Plan:    Active Hospital Problems    Diagnosis     Cellulitis [L03.90]      Priority: Medium    Pseudoaneurysm ([address] [I72.9]      Priority: Medium    Pseudoaneurysm of femoral artery (HCC) [I72.4]      Priority: Medium     Cellulitis and possible abscess of her left groin, IV antibiotics, vascular surgery consulted, continue vancomycin and cefepime for now, stay morning. Surgery following  Large pseudoaneurysm of the distal left femoral artery likely correlating with history of IV drug abuse, vascular surgery consulted, potential surgical intervention. urine positive for cocaine and amphetamine, IV drug use, counseled  IV drug use counseled  Hyponatremia mild, monitor  Anemia, appears chronic, follow-up as with signs of bleeding      Diet: ADULT DIET;  Regular  Code Status: Full Code      Mouna Goss MD

## 2022-11-06 NOTE — PROGRESS NOTES
Clinical Pharmacy Note  Vancomycin Consult    Sabas Alvarado is a 47 y.o. female ordered Vancomycin for abscess; consult received from Dr. Gianfranco Oconnell to manage therapy. Also receiving cefepime. Allergies:  Pcn [penicillins]     Temp max:  Temp (24hrs), Av.4 °F (36.9 °C), Min:98 °F (36.7 °C), Max:98.7 °F (37.1 °C)      Recent Labs     22  0116 22  1150 22  1255   WBC 10.5 8.3 7.5       Recent Labs     22  0116 22  1151 22  1255   BUN 14 11 9   CREATININE 0.9 0.7 0.9         Intake/Output Summary (Last 24 hours) at 2022  Last data filed at 2022  Gross per 24 hour   Intake 480 ml   Output 0 ml   Net 480 ml       Culture Results:      Ht Readings from Last 1 Encounters:   22 5' 2\" (1.575 m)        Wt Readings from Last 1 Encounters:   22 115 lb 4.8 oz (52.3 kg)         Estimated Creatinine Clearance: 58 mL/min (based on SCr of 0.9 mg/dL). Assessment:  Day # 2 of vancomycin. Current regimen: 750 mg every 12 hours  Vancomycin level: 8.8 mg/L  Predicted AUC: 565    Plan:  Continue current regimen. Thank you for the consult.    George Tong, PharmD

## 2022-11-06 NOTE — CARE COORDINATION
INITIAL CASE MANAGEMENT ASSESSMENT    Reviewed chart, met with patient to assess possible discharge needs. Explained Case Management role/services. Living Situation: Updated address, lives with son in a 2 level house, 3 steps w/ railing    ADLs: Independent     DME: None    PT/OT Recs: Not ordered     Active Services: None     Transportation: Friends or uses public transport     Medications: Would use [employer] in [place]     PCP: Prior PCP (Dr  located in [facility], wants PCP list to establish care on this side of [facility]. HD/PD: n/a    PLAN/COMMENTS: Patient wants to return home. Watch ID recs for IV antibiotics. Patient has history of drug use so can't do home care for IV. Vascular has homograft planned for Tuesday. SW/CM provided contact information for patient or family to call with any questions. SW/CM will follow and assist as needed.     Electronically signed by Kaitlin Mohr RN Case Management [phone] on 11/6/2022 at 3:38 PM

## 2022-11-07 ENCOUNTER — ANESTHESIA EVENT (OUTPATIENT)
Dept: OPERATING ROOM | Age: 52
DRG: 181 | End: 2022-11-07
Payer: COMMERCIAL

## 2022-11-07 PROBLEM — B18.2 HEP C W/O COMA, CHRONIC (HCC): Status: ACTIVE | Noted: 2022-11-07

## 2022-11-07 PROBLEM — R70.0 ELEVATED ERYTHROCYTE SEDIMENTATION RATE: Status: ACTIVE | Noted: 2022-11-07

## 2022-11-07 PROBLEM — R59.0 INGUINAL ADENOPATHY: Status: ACTIVE | Noted: 2022-11-07

## 2022-11-07 PROBLEM — R79.82 CRP ELEVATED: Status: ACTIVE | Noted: 2022-11-07

## 2022-11-07 PROBLEM — F19.10 IV DRUG ABUSE (HCC): Status: ACTIVE | Noted: 2022-11-07

## 2022-11-07 LAB
ABO/RH: NORMAL
ALBUMIN SERPL-MCNC: 3.6 G/DL (ref 3.4–5)
ANION GAP SERPL CALCULATED.3IONS-SCNC: 11 MMOL/L (ref 3–16)
ANTIBODY IDENTIFICATION: NORMAL
ANTIBODY SCREEN: NORMAL
BASOPHILS ABSOLUTE: 0.1 K/UL (ref 0–0.2)
BASOPHILS RELATIVE PERCENT: 0.9 %
BLOOD BANK DISPENSE STATUS: NORMAL
BLOOD BANK DISPENSE STATUS: NORMAL
BLOOD BANK PRODUCT CODE: NORMAL
BLOOD BANK PRODUCT CODE: NORMAL
BPU ID: NORMAL
BPU ID: NORMAL
BUN BLDV-MCNC: 7 MG/DL (ref 7–20)
CALCIUM SERPL-MCNC: 9.5 MG/DL (ref 8.3–10.6)
CHLORIDE BLD-SCNC: 99 MMOL/L (ref 99–110)
CO2: 25 MMOL/L (ref 21–32)
CREAT SERPL-MCNC: 0.7 MG/DL (ref 0.6–1.1)
DAT IGG CAPTURE: NORMAL
DESCRIPTION BLOOD BANK: NORMAL
DESCRIPTION BLOOD BANK: NORMAL
EOSINOPHILS ABSOLUTE: 0.3 K/UL (ref 0–0.6)
EOSINOPHILS RELATIVE PERCENT: 4.8 %
GFR SERPL CREATININE-BSD FRML MDRD: >60 ML/MIN/{1.73_M2}
GLUCOSE BLD-MCNC: 114 MG/DL (ref 70–99)
HCT VFR BLD CALC: 32.3 % (ref 36–48)
HEMOGLOBIN: 11.2 G/DL (ref 12–16)
LYMPHOCYTES ABSOLUTE: 1.3 K/UL (ref 1–5.1)
LYMPHOCYTES RELATIVE PERCENT: 20.5 %
MAGNESIUM: 1.8 MG/DL (ref 1.8–2.4)
MCH RBC QN AUTO: 29 PG (ref 26–34)
MCHC RBC AUTO-ENTMCNC: 34.6 G/DL (ref 31–36)
MCV RBC AUTO: 83.6 FL (ref 80–100)
MONOCYTES ABSOLUTE: 0.7 K/UL (ref 0–1.3)
MONOCYTES RELATIVE PERCENT: 10.8 %
NEUTROPHILS ABSOLUTE: 3.9 K/UL (ref 1.7–7.7)
NEUTROPHILS RELATIVE PERCENT: 63 %
PDW BLD-RTO: 13.9 % (ref 12.4–15.4)
PHOSPHORUS: 3.6 MG/DL (ref 2.5–4.9)
PLATELET # BLD: 452 K/UL (ref 135–450)
PMV BLD AUTO: 7.2 FL (ref 5–10.5)
POTASSIUM SERPL-SCNC: 4.5 MMOL/L (ref 3.5–5.1)
RBC # BLD: 3.86 M/UL (ref 4–5.2)
SODIUM BLD-SCNC: 135 MMOL/L (ref 136–145)
VANCOMYCIN RANDOM: 15.4 UG/ML
WBC # BLD: 6.2 K/UL (ref 4–11)

## 2022-11-07 PROCEDURE — APPNB30 APP NON BILLABLE TIME 0-30 MINS: Performed by: NURSE PRACTITIONER

## 2022-11-07 PROCEDURE — 86880 COOMBS TEST DIRECT: CPT

## 2022-11-07 PROCEDURE — 6370000000 HC RX 637 (ALT 250 FOR IP): Performed by: STUDENT IN AN ORGANIZED HEALTH CARE EDUCATION/TRAINING PROGRAM

## 2022-11-07 PROCEDURE — 99233 SBSQ HOSP IP/OBS HIGH 50: CPT | Performed by: INTERNAL MEDICINE

## 2022-11-07 PROCEDURE — 86870 RBC ANTIBODY IDENTIFICATION: CPT

## 2022-11-07 PROCEDURE — 85025 COMPLETE CBC W/AUTO DIFF WBC: CPT

## 2022-11-07 PROCEDURE — 86850 RBC ANTIBODY SCREEN: CPT

## 2022-11-07 PROCEDURE — 2580000003 HC RX 258: Performed by: STUDENT IN AN ORGANIZED HEALTH CARE EDUCATION/TRAINING PROGRAM

## 2022-11-07 PROCEDURE — 6360000002 HC RX W HCPCS: Performed by: INTERNAL MEDICINE

## 2022-11-07 PROCEDURE — 83735 ASSAY OF MAGNESIUM: CPT

## 2022-11-07 PROCEDURE — 2580000003 HC RX 258: Performed by: INTERNAL MEDICINE

## 2022-11-07 PROCEDURE — 86902 BLOOD TYPE ANTIGEN DONOR EA: CPT

## 2022-11-07 PROCEDURE — 80202 ASSAY OF VANCOMYCIN: CPT

## 2022-11-07 PROCEDURE — 6370000000 HC RX 637 (ALT 250 FOR IP): Performed by: INTERNAL MEDICINE

## 2022-11-07 PROCEDURE — 86901 BLOOD TYPING SEROLOGIC RH(D): CPT

## 2022-11-07 PROCEDURE — 86922 COMPATIBILITY TEST ANTIGLOB: CPT

## 2022-11-07 PROCEDURE — 80069 RENAL FUNCTION PANEL: CPT

## 2022-11-07 PROCEDURE — 86900 BLOOD TYPING SEROLOGIC ABO: CPT

## 2022-11-07 PROCEDURE — 6360000002 HC RX W HCPCS: Performed by: STUDENT IN AN ORGANIZED HEALTH CARE EDUCATION/TRAINING PROGRAM

## 2022-11-07 PROCEDURE — 1200000000 HC SEMI PRIVATE

## 2022-11-07 PROCEDURE — 86860 RBC ANTIBODY ELUTION: CPT

## 2022-11-07 RX ORDER — SODIUM CHLORIDE 9 MG/ML
INJECTION, SOLUTION INTRAVENOUS PRN
Status: DISCONTINUED | OUTPATIENT
Start: 2022-11-07 | End: 2022-11-07 | Stop reason: SDUPTHER

## 2022-11-07 RX ORDER — SODIUM CHLORIDE 0.9 % (FLUSH) 0.9 %
5-40 SYRINGE (ML) INJECTION PRN
Status: DISCONTINUED | OUTPATIENT
Start: 2022-11-07 | End: 2022-11-07 | Stop reason: SDUPTHER

## 2022-11-07 RX ORDER — SODIUM CHLORIDE 0.9 % (FLUSH) 0.9 %
5-40 SYRINGE (ML) INJECTION EVERY 12 HOURS SCHEDULED
Status: DISCONTINUED | OUTPATIENT
Start: 2022-11-07 | End: 2022-11-07 | Stop reason: SDUPTHER

## 2022-11-07 RX ADMIN — VANCOMYCIN HYDROCHLORIDE 750 MG: 750 INJECTION, POWDER, LYOPHILIZED, FOR SOLUTION INTRAVENOUS at 08:32

## 2022-11-07 RX ADMIN — CEFEPIME 2000 MG: 2 INJECTION, POWDER, FOR SOLUTION INTRAVENOUS at 03:24

## 2022-11-07 RX ADMIN — OXYCODONE HYDROCHLORIDE 10 MG: 10 TABLET ORAL at 20:12

## 2022-11-07 RX ADMIN — SODIUM CHLORIDE, PRESERVATIVE FREE 10 ML: 5 INJECTION INTRAVENOUS at 08:34

## 2022-11-07 RX ADMIN — OXYCODONE HYDROCHLORIDE 10 MG: 10 TABLET ORAL at 03:36

## 2022-11-07 RX ADMIN — ENOXAPARIN SODIUM 30 MG: 100 INJECTION SUBCUTANEOUS at 08:33

## 2022-11-07 RX ADMIN — CEFEPIME 2000 MG: 2 INJECTION, POWDER, FOR SOLUTION INTRAVENOUS at 14:27

## 2022-11-07 RX ADMIN — VANCOMYCIN HYDROCHLORIDE 750 MG: 750 INJECTION, POWDER, LYOPHILIZED, FOR SOLUTION INTRAVENOUS at 20:14

## 2022-11-07 RX ADMIN — OXYCODONE HYDROCHLORIDE 10 MG: 10 TABLET ORAL at 08:33

## 2022-11-07 RX ADMIN — OXYCODONE HYDROCHLORIDE 10 MG: 10 TABLET ORAL at 14:25

## 2022-11-07 ASSESSMENT — PAIN SCALES - GENERAL
PAINLEVEL_OUTOF10: 0
PAINLEVEL_OUTOF10: 8
PAINLEVEL_OUTOF10: 10
PAINLEVEL_OUTOF10: 8
PAINLEVEL_OUTOF10: 10
PAINLEVEL_OUTOF10: 3
PAINLEVEL_OUTOF10: 9
PAINLEVEL_OUTOF10: 8

## 2022-11-07 ASSESSMENT — PAIN DESCRIPTION - LOCATION
LOCATION: GROIN;LEG
LOCATION: GROIN;LEG
LOCATION: GROIN
LOCATION: GROIN

## 2022-11-07 ASSESSMENT — PAIN - FUNCTIONAL ASSESSMENT
PAIN_FUNCTIONAL_ASSESSMENT: ACTIVITIES ARE NOT PREVENTED
PAIN_FUNCTIONAL_ASSESSMENT: ACTIVITIES ARE NOT PREVENTED
PAIN_FUNCTIONAL_ASSESSMENT: PREVENTS OR INTERFERES SOME ACTIVE ACTIVITIES AND ADLS

## 2022-11-07 ASSESSMENT — PAIN DESCRIPTION - FREQUENCY
FREQUENCY: CONTINUOUS
FREQUENCY: CONTINUOUS

## 2022-11-07 ASSESSMENT — PAIN DESCRIPTION - ORIENTATION
ORIENTATION: LEFT

## 2022-11-07 ASSESSMENT — PAIN DESCRIPTION - DESCRIPTORS
DESCRIPTORS: ACHING;PRESSURE
DESCRIPTORS: PRESSURE
DESCRIPTORS: PRESSURE
DESCRIPTORS: ACHING;PRESSURE

## 2022-11-07 ASSESSMENT — PAIN DESCRIPTION - ONSET: ONSET: ON-GOING

## 2022-11-07 ASSESSMENT — PAIN SCALES - WONG BAKER: WONGBAKER_NUMERICALRESPONSE: 0

## 2022-11-07 ASSESSMENT — PAIN DESCRIPTION - PAIN TYPE: TYPE: ACUTE PAIN

## 2022-11-07 NOTE — PLAN OF CARE
Problem: Discharge Planning  Goal: Discharge to home or other facility with appropriate resources  11/7/2022 1206 by Angela Zuniga RN  Outcome: Progressing  11/6/2022 2315 by Sami Laughlin RN  Outcome: Progressing  Flowsheets (Taken 11/6/2022 2212)  Discharge to home or other facility with appropriate resources: Identify barriers to discharge with patient and caregiver     Problem: Safety - Adult  Goal: Free from fall injury  11/7/2022 1206 by Angela Zuniga RN  Outcome: Progressing  11/6/2022 2315 by Sami Laughlin RN  Outcome: Progressing     Problem: Pain  Goal: Verbalizes/displays adequate comfort level or baseline comfort level  11/7/2022 1206 by Angela Zuniga RN  Outcome: Progressing  11/6/2022 2315 by Sami Laughlin RN  Outcome: Progressing

## 2022-11-07 NOTE — PLAN OF CARE
Problem: Discharge Planning  Goal: Discharge to home or other facility with appropriate resources  11/6/2022 2315 by Lisa Gould RN  Outcome: Progressing  Flowsheets (Taken 11/6/2022 2212)  Discharge to home or other facility with appropriate resources: Identify barriers to discharge with patient and caregiver  11/6/2022 1548 by Lalitha Durand RN  Outcome: Progressing     Problem: Safety - Adult  Goal: Free from fall injury  11/6/2022 2315 by Lisa Gould RN  Outcome: Progressing  11/6/2022 1548 by Lalitha Durand RN  Outcome: Progressing     Problem: Pain  Goal: Verbalizes/displays adequate comfort level or baseline comfort level  11/6/2022 2315 by Lisa Gould RN  Outcome: Progressing  11/6/2022 1548 by Lalitha Durand RN  Outcome: Progressing

## 2022-11-07 NOTE — PROGRESS NOTES
Pt remained in bed this shift except when up to bathroom. Pt continues to c/o LLE pain- PRN pain meds administered as ordered. Continues with hardened swollen areas to L groin- pt using ice as needed. Consent obtained for surgery tomorrow. Pt aware of NPO status at midnight.   Electronically signed by Nydia Bruno RN on 11/7/22 at 6:29 PM EST

## 2022-11-07 NOTE — PROGRESS NOTES
Clinical Pharmacy Note  Vancomycin Consult    Alhaji Howard is a 47 y.o. female ordered Vancomycin for abscess; consult received from Dr. Alejandro De Los Santos to manage therapy. Also receiving cefepime. Allergies:  Pcn [penicillins]     Temp max:  Temp (24hrs), Av.9 °F (36.6 °C), Min:97.5 °F (36.4 °C), Max:98.4 °F (36.9 °C)      Recent Labs     22  1255 22  0530 22  0550   WBC 7.5 7.6 6.2         Recent Labs     22  1255 22  0530 22  0550   BUN 9 10 7   CREATININE 0.9 0.6 0.7           Intake/Output Summary (Last 24 hours) at 2022 0703  Last data filed at 2022 0085  Gross per 24 hour   Intake 2608.06 ml   Output 3 ml   Net 2605.06 ml         Culture Results:  pending    Ht Readings from Last 1 Encounters:   22 5' 2\" (1.575 m)        Wt Readings from Last 1 Encounters:   22 109 lb 9.1 oz (49.7 kg)         Estimated Creatinine Clearance: 75 mL/min (based on SCr of 0.7 mg/dL). Assessment:  Day # 4 of 7 of Vancomycin. Current regimen: 1000 mg every 12 hours  Vancomycin level: 15.4 mg/L  Predicted AUC: 559    Plan:  Given that 1000 mg Q12H provides 20 mg/kg/dose, will decrease dose to 750 mg Q12H  Predicted AUC at this time with reduced dose is 420    Thank you for the consult. Will continue to follow. Hoa Smith, SouravD.   2022  7:05 AM

## 2022-11-07 NOTE — PROGRESS NOTES
Infectious Disease Follow up Notes  Admit Date: 11/4/2022  Hospital Day: 4    Antibiotics :   IV Vancomycin   IV Cefepime     CHIEF COMPLAINT:     Left femoral artery Pseudoaneurysm  Left groin cellulitis   IVDA    Subjective interval History :  47 y.o. woman with a past history significant for hep C, IV drug abuse, history of DVT in the past, not on anticoagulation currently admitted to the hospital secondary to left groin pain and localized swelling with associated lymphadenopathy. Patient has been injecting drugs for a long time she been using all the veins she admits to injecting into the left groin she missed her vein and hit her artery in the past.  She was using opiates in the past and now started using cocaine and Amphetamines her last use was before coming in to  hospital.  Patient has been using profanity during consultation and very irritable. She was already evaluated by vascular surgery. Blood cultures are obtained are in process. CTA of the left lower extremity in the ED indicated left distal femoral artery pseudoaneurysm with underlying local changes also associated adenopathy likely reactive lymph nodes in the groin. She was seen in the ED on 10/1/22 for the pain in the groin but she eloped from the ED before being evaluated. Labs indicate creatinine 1.1, UDS positive for cocaine amphetamine, WBC 10.5 hemoglobin 11.1 platelets 610.   Location :   Left groin swelling pain localized adenopathy  Quality : Aching      Severity : 10/10    Duration :  1 week     Timing : constant   Context :  IVDA   Modifying factors :None   Associated signs and symptoms: no FEVERS, no chills local groin pain and swelling+           Interval History : no FEVER left groin pain going down and tolerating IV abx ok HIV -ve       Past Medical History:    Past Medical History:   Diagnosis Date    Hepatitis C without hepatic coma     Hx of blood clots     DEEP VEIN THROMBOSIS FROM IV DRUG USE    IV drug abuse ([facility] [address]     None for 5 mos    Methadone use        Past Surgical History:    Past Surgical History:   Procedure Laterality Date    BREAST BIOPSY Right 2015    Excisional     SECTION      TUBAL LIGATION         Current Medications:    Outpatient Medications Marked as Taking for the 22 encounter [facility] Encounter)   Medication Sig Dispense Refill    ibuprofen (ADVIL;MOTRIN) 200 MG tablet Take 800 mg by mouth in the morning, at noon, in the evening, and at bedtime      acetaminophen (TYLENOL) 500 MG tablet Take 1,000-2,000 mg by mouth in the morning, at noon, in the evening, and at bedtime         Allergies:  Pcn [penicillins]    Immunizations :   Immunization History   Administered Date(s) Administered    COVID-19, PFIZER PURPLE top, DILUTE for use, (age 15 y+), 30mcg/0.3mL 2021    Tdap (Boostrix, Adacel) 2020       Social History:      Social History     Tobacco Use    Smoking status: Every Day     Packs/day: 1.00     Years: 20.00     Pack years: 20.00     Types: Cigarettes    Smokeless tobacco: Never   Substance Use Topics    Alcohol use: Yes    Drug use: Yes     Frequency: 1.0 times per week     Types: IV, Cocaine     Social History     Tobacco Use   Smoking Status Every Day    Packs/day: 1.00    Years: 20.00    Pack years: 20.00    Types: Cigarettes   Smokeless Tobacco Never      Family History  : no DVT no COPD        REVIEW OF SYSTEMS:      Constitutional:  negative for fevers, chills, night sweats  Eyes:  negative for blurred vision, eye discharge, visual disturbance   HEENT:  negative for hearing loss, ear drainage,nasal congestion  Respiratory:  negative for cough, shortness of breath or hemoptysis   Cardiovascular:  negative for chest pain, palpitations, syncope  Gastrointestinal:  negative for nausea, vomiting, diarrhea, constipation, abdominal pain  Genitourinary:  negative for frequency, dysuria, urinary tract adenopathy not much local cellulitis noted      Data Review:    CBC:   Lab Results   Component Value Date    WBC 6.2 11/07/2022    HGB 11.2 (L) 11/07/2022    HCT 32.3 (L) 11/07/2022    MCV 83.6 11/07/2022     (H) 11/07/2022     RENAL:   Lab Results   Component Value Date    CREATININE 0.7 11/07/2022    BUN 7 11/07/2022     (L) 11/07/2022    K 4.5 11/07/2022    CL 99 11/07/2022    CO2 25 11/07/2022     SED RATE:   Lab Results   Component Value Date/Time    SEDRATE 57 11/05/2022 04:03 AM     CK: No results found for: CKTOTAL  CRP:   Lab Results   Component Value Date/Time    CRP 8.8 11/05/2022 04:03 AM     Hepatic Function Panel:   Lab Results   Component Value Date/Time    ALKPHOS 113 11/04/2022 01:16 AM    ALT 13 11/04/2022 01:16 AM    AST 21 11/04/2022 01:16 AM    PROT 9.0 11/04/2022 01:16 AM    BILITOT <0.2 11/04/2022 01:16 AM    BILIDIR 0.3 11/16/2019 12:58 AM    IBILI 0.5 11/16/2019 12:58 AM    LABALBU 3.6 11/07/2022 05:50 AM     UA:  Lab Results   Component Value Date/Time    COLORU Yellow 01/01/2019 09:39 PM    CLARITYU Clear 01/01/2019 09:39 PM    GLUCOSEU Negative 01/01/2019 09:39 PM    BILIRUBINUR Negative 01/01/2019 09:39 PM    KETUA 15 01/01/2019 09:39 PM    SPECGRAV >=1.030 01/01/2019 09:39 PM    BLOODU Negative 01/01/2019 09:39 PM    PHUR 6.5 11/05/2022 12:09 PM    PROTEINU Negative 01/01/2019 09:39 PM    UROBILINOGEN 0.2 01/01/2019 09:39 PM    NITRU Negative 01/01/2019 09:39 PM    LEUKOCYTESUR Negative 01/01/2019 09:39 PM    LABMICR Not Indicated 01/01/2019 09:39 PM    Robertha Bibles Not Specified 01/01/2019 09:39 PM      Urine Microscopic: No results found for: LABCAST, BACTERIA, COMU, HYALCAST, WBCUA, RBCUA, EPIU  Urine Reflex to Culture:   Lab Results   Component Value Date/Time    URRFLXCULT Not Indicated 01/01/2019 09:39 PM         Ref Range & Units 11/04/22 1020   Amphetamine Screen, Urine Negative <1000ng/mL POSITIVE Abnormal     Comment: High concentrations of ephedrine/pseudoephedrine or   phenylpropanolamine may cause false positive results   for amphetamine. Therefore, confirmatory testing for   amphetamine should be considered if clinically indicated. Barbiturate Screen, Ur Negative <200 ng/mL Neg    Benzodiazepine Screen, Urine Negative <200 ng/mL Neg    Cannabinoid Scrn, Ur Negative <50 ng/mL Neg    Cocaine Metabolite Screen, Urine Negative <300 ng/mL POSITIVE Abnormal       MICRO: cultures reviewed and updated by me   Blood Culture:   Lab Results   Component Value Date/Time    [facility]  11/04/2022 02:50 AM     No Growth to date. Any change in status will be called. Respiratory Culture:  No results found for: Miles Courts  AFB:No results found for: AFBSMEAR  Viral Culture:  No results found for: COVID19  Urine Culture: No results for input(s): Kellie Lucas in the last 72 hours. IMAGING:    CTA LOWER EXTREMITY LEFT W CONTRAST   Final Result   1. Large pseudoaneurysm of the distal left femoral artery likely correlating   to history of IV drug abuse. 2. There is interval thickening and inflammation along the femoral artery and   the proximal superficial femoral artery suggesting vasculitis in association   with above. 3. Cellulitis of the left groin with multiple reactive enlarged lymph nodes.            Component Ref Range & Units 11/05/22 1255   HIV Ag/Ab Non-reactive Non-Reactive    HIV-1 Antibody Non-reactive Non-Reactive    HIV ANTIGEN Non-reactive Non-Reactive    HIV-2 Ab Non-reactive Non-Reactive    Resulting Agency  [address] [facility]        Specimen Collected: 11/05/22 1255 Last Resulted: 11/05/22 1446 View Other Order Details          All the pertinent images and reports for the current Hospitalization were reviewed by me     Scheduled Meds:   vancomycin  750 mg IntraVENous Q12H    sodium chloride flush  5-40 mL IntraVENous 2 times per day    enoxaparin  30 mg SubCUTAneous Daily    cefepime  2,000 mg IntraVENous Q12H    vancomycin (VANCOCIN) intermittent dosing (placeholder)   Other RX Placeholder    nicotine  1 patch TransDERmal Daily    lidocaine 1 % injection  5 mL IntraDERmal Once       Continuous Infusions:   sodium chloride Stopped (11/06/22 1839)       PRN Meds:  calcium carbonate, sodium chloride flush, sodium chloride, polyethylene glycol, acetaminophen **OR** acetaminophen, ondansetron, oxyCODONE **OR** oxyCODONE      Assessment:     Patient Active Problem List   Diagnosis    Breast mass, right    Cellulitis    Pseudoaneurysm (HCC)    Pseudoaneurysm of femoral artery (HCC)     Left Distal Femoral artery large Pseudo aneurysm+  IVDA on going  H/o Direct Needle injection into Left groin  Cocaine abuse  Amphetamine abuse  Hep C+Ve  Left inguinal adenopathy  BMI at  21   CTA very abnormal involving the Left distal femoral artery Pseudo aneurysm         Unfortunately major complication from direct needle injury to Leti femoral artery and IVDA has been using Cocaine and Amphetamine - seen by vascular team concerned about rupture and bleed - will need IV abx given the presentation concern for local infection she has no fever or reactive WBC so no direct bacterial invasion yet - possible local reaction risk for seeding and systemic spread       Blood cx -ve and HIV screen -ve      Labs, Microbiology, Radiology and all the pertinent results from current hospitalization and  care every where were reviewed  by me as a part of the evaluation   Plan:   Cont IV Vancomycin  x 750 mg q 12 HRS  IV Cefepime x 2 gm q 12 HR  Blood cx NGTD  ESR 57 , CRP 8.8   HIV screen  -ve  Vascular surgery following   Watch for Withdrawal   Plans for surgery noted          Discussed with patient/Family and Nursing   Risk of Complications/Morbidity: High      Illness(es)/ Infection present that pose threat to bodily function. There is potential for severe exacerbation of infection/side effects of treatment.   Therapy requires intensive monitoring for antimicrobial agent toxicity. Discussed with patient/Family and Nursing staff     Thanks for allowing me to participate in your patient's care and please call me with any questions or concerns.     Sheree Rao MD  Infectious Disease  [facility] Physician  Phone: [phone]   Fax : [phone]

## 2022-11-07 NOTE — PROGRESS NOTES
[facility]  Progress Note    11/7/2022 9:47 AM    Chief Complaint: Left Groin Pain    Reason for Consult: Left Groin Abscess    Subjective: Pt seen resting in bed, pain and swelling stable, no complaints, planning for surgery tomorrow     Vital Signs:  Vitals:    11/07/22 0044 11/07/22 0330 11/07/22 0406 11/07/22 0815   BP: 136/83 (!) 170/99  (!) 154/94   Pulse: 93   (!) 101   Resp: 20  20 20   Temp: 97.7 °F (36.5 °C) 97.9 °F (36.6 °C)  98 °F (36.7 °C)   TempSrc: Oral   Oral   SpO2: 99% 100%  100%   Weight: 109 lb 9.1 oz (49.7 kg)      Height:           I/O:    Intake/Output Summary (Last 24 hours) at 11/7/2022 0947  Last data filed at 11/7/2022 0332  Gross per 24 hour   Intake 1505.99 ml   Output 3 ml   Net 1502.99 ml       Physical Exam:   General: no apparent distress, alert and oriented, afebrile  Chest/Lungs: non labored breathing no tachypnea, retractions or cyanosis  Extremities: normal ROM, Left femoral area tender, Left groin appears unchanged  Vascular: extremities warm and well perfused, no signs of cyanosis or ischemia    Pulses:  -R DP: +2/4 palpable  -L DP: +2/4 palpable    Labs:   Lab Results   Component Value Date/Time     11/07/2022 05:50 AM    K 4.5 11/07/2022 05:50 AM    K 3.7 11/16/2019 12:58 AM    CL 99 11/07/2022 05:50 AM    CO2 25 11/07/2022 05:50 AM    BUN 7 11/07/2022 05:50 AM    CREATININE 0.7 11/07/2022 05:50 AM    GFRAA >60 11/16/2019 12:58 AM    LABGLOM >60 11/07/2022 05:50 AM    GLUCOSE 114 11/07/2022 05:50 AM    PHOS 3.6 11/07/2022 05:50 AM    MG 1.80 11/07/2022 05:50 AM    CALCIUM 9.5 11/07/2022 05:50 AM     Lab Results   Component Value Date/Time    WBC 6.2 11/07/2022 05:50 AM    RBC 3.86 11/07/2022 05:50 AM    HGB 11.2 11/07/2022 05:50 AM    HCT 32.3 11/07/2022 05:50 AM    MCV 83.6 11/07/2022 05:50 AM    RDW 13.9 11/07/2022 05:50 AM     11/07/2022 05:50 AM   No results found for: INR, PROTIME     Scheduled Meds:    vancomycin  750 mg IntraVENous Q12H    sodium chloride flush  5-40 mL IntraVENous 2 times per day    enoxaparin  30 mg SubCUTAneous Daily    cefepime  2,000 mg IntraVENous Q12H    vancomycin (VANCOCIN) intermittent dosing (placeholder)   Other RX Placeholder    nicotine  1 patch TransDERmal Daily    lidocaine 1 % injection  5 mL IntraDERmal Once     Continuous Infusions:    sodium chloride Stopped (11/06/22 1839)       Imaging:   CTA LLE - 11/4/2022  Impression  1. Large pseudoaneurysm of the distal left femoral artery likely correlating to history of IV drug abuse. 2. There is interval thickening and inflammation along the femoral artery and the proximal superficial femoral artery suggesting vasculitis in association with above. 3. Cellulitis of the left groin with multiple reactive enlarged lymph nodes. Assessment:   -Left Distal Femoral Artery Pseudoaneurysm  -IV Drug use - Cocaine  -Left Groin Pain  -Left Foot DP palpable  -Left Groin Pain and Swelling stable    Plan:  -OR tomorrow for Left Femoral Artery Replacement with Cadaver Vein and Sartorious Muscle Flap with Dr. Ritu Tomas  -NPO after midnight  -Hold Lovenox tomorrow AM  -IV Vancomycin and Cefepime - ID Following    All pertinent information and plan of care discussed with Dr. Rachel Adams. All questions and concerns were addressed with the patient. I have discussed the above stated plan with the patient and the nurse. The patient verbalized understanding and agreed with the plan.     Thank you for allowing to us to participate in the care of Brandon Staples      Electronically signed by SANYA Heck CNP on 11/7/2022 at 9:47 AM       VASCULAR STAFF    Patient evaluated at the same time in conjunction with the ACP or resident of which I performed greater than 50% of the history, physical exam, and/or medical decision making:    Discussed again with patient plans for left femoral artery replacement tomorrow with likely sartorius muscle flap  Risks/benefits/alternatives reviewed  Patient understands the risks and is willing to proceed  NPO after midnight  Type and screen repeat tomorrow morning    Nicky Mcclellan DO, FACS, FSVS, [address] [facility]

## 2022-11-07 NOTE — PROGRESS NOTES
Hospitalist Progress Note      PCP: Raul Briones MD    Date of Admission: 11/4/2022      Hospital Course: 52-year-old female admitted to the hospital with 5 days history of worsening groin pain on the left, vascular surgery consulted being treated for cellulitis. Diagnosed with large pseudoaneurysm, plan for surgical intervention in a.m. on Tuesday, November 8    Subjective: Is better, still having pain though no fever chills nausea vomiting no leg cramps. Medications:  Reviewed    Infusion Medications    sodium chloride Stopped (11/06/22 1839)     Scheduled Medications    vancomycin  750 mg IntraVENous Q12H    sodium chloride flush  5-40 mL IntraVENous 2 times per day    enoxaparin  30 mg SubCUTAneous Daily    cefepime  2,000 mg IntraVENous Q12H    vancomycin (VANCOCIN) intermittent dosing (placeholder)   Other RX Placeholder    nicotine  1 patch TransDERmal Daily    lidocaine 1 % injection  5 mL IntraDERmal Once     PRN Meds: calcium carbonate, sodium chloride flush, sodium chloride, polyethylene glycol, acetaminophen **OR** acetaminophen, ondansetron, oxyCODONE **OR** oxyCODONE      Intake/Output Summary (Last 24 hours) at 11/7/2022 0921  Last data filed at 11/7/2022 0332  Gross per 24 hour   Intake 1505.99 ml   Output 3 ml   Net 1502.99 ml       Physical Exam Performed:    BP (!) 154/94   Pulse (!) 101   Temp 98 °F (36.7 °C) (Oral)   Resp 20   Ht 5' 2\" (1.575 m)   Wt 109 lb 9.1 oz (49.7 kg)   SpO2 100%   BMI 20.04 kg/m²     General appearance: No apparent distress  Neck: Supple  Respiratory:  Normal respiratory effort. Clear to auscultation, bilaterally without Rales/Wheezes/Rhonchi. Cardiovascular: Regular rate and rhythm with normal S1/S2 without murmurs, rubs or gallops. Abdomen: Soft, non-tender, non-distended  Musculoskeletal: No clubbing, cyanosis   Skin: Skin color, texture, turgor normal.  No rashes or lesions.   Neurologic:  No focal weakness  Psychiatric: Alert and oriented  Capillary Refill: Brisk, 3 seconds, normal   Peripheral Pulses: +2 palpable, equal bilaterally       Labs:   Recent Labs     11/05/22  1255 11/06/22  0530 11/07/22  0550   WBC 7.5 7.6 6.2   HGB 10.7* 10.7* 11.2*   HCT 32.8* 32.3* 32.3*   * 466* 452*     Recent Labs     11/05/22  1255 11/06/22  0530 11/07/22  0550   * 130* 135*   K 4.3 4.2 4.5   CL 96* 96* 99   CO2 29 26 25   BUN 9 10 7   CREATININE 0.9 0.6 0.7   CALCIUM 9.2 8.7 9.5   PHOS 3.5 2.5 3.6     No results for input(s): AST, ALT, BILIDIR, BILITOT, ALKPHOS in the last 72 hours. No results for input(s): INR in the last 72 hours. No results for input(s): [employer] Medal in the last 72 hours. Urinalysis:      Lab Results   Component Value Date/Time    NITRU Negative 01/01/2019 09:39 PM    BLOODU Negative 01/01/2019 09:39 PM    SPECGRAV >=1.030 01/01/2019 09:39 PM    GLUCOSEU Negative 01/01/2019 09:39 PM       Radiology:  CTA LOWER EXTREMITY LEFT W CONTRAST   Final Result   1. Large pseudoaneurysm of the distal left femoral artery likely correlating   to history of IV drug abuse. 2. There is interval thickening and inflammation along the femoral artery and   the proximal superficial femoral artery suggesting vasculitis in association   with above. 3. Cellulitis of the left groin with multiple reactive enlarged lymph nodes. Assessment/Plan:    Active Hospital Problems    Diagnosis     Cellulitis [L03.90]      Priority: Medium    Pseudoaneurysm ([address] [I72.9]      Priority: Medium    Pseudoaneurysm of femoral artery ([facility]) [I72.4]      Priority: Medium     Cellulitis and possible abscess of her left groin, IV antibiotics, vascular surgery consulted, continue vancomycin and cefepime for now, stay morning. Vascular surgery following and 4 OR in a.m.   Large pseudoaneurysm of the distal left femoral artery likely correlating with history of IV drug abuse, vascular surgery consulted, potential surgical intervention. urine positive for cocaine and amphetamine, IV drug use, counseled  IV drug use counseled  Hyponatremia mild, monitor  Anemia, appears chronic, follow-up as with signs of bleeding      Diet: ADULT DIET;  Regular  Code Status: Full Code      Candance Mess, MD

## 2022-11-08 ENCOUNTER — ANESTHESIA (OUTPATIENT)
Dept: OPERATING ROOM | Age: 52
DRG: 181 | End: 2022-11-08
Payer: COMMERCIAL

## 2022-11-08 LAB
ALBUMIN SERPL-MCNC: 3.7 G/DL (ref 3.4–5)
ANION GAP SERPL CALCULATED.3IONS-SCNC: 7 MMOL/L (ref 3–16)
BASOPHILS ABSOLUTE: 0.1 K/UL (ref 0–0.2)
BASOPHILS RELATIVE PERCENT: 1.2 %
BLOOD CULTURE, ROUTINE: NORMAL
BUN BLDV-MCNC: 11 MG/DL (ref 7–20)
CALCIUM SERPL-MCNC: 9.8 MG/DL (ref 8.3–10.6)
CHLORIDE BLD-SCNC: 95 MMOL/L (ref 99–110)
CO2: 28 MMOL/L (ref 21–32)
CREAT SERPL-MCNC: 0.8 MG/DL (ref 0.6–1.1)
EOSINOPHILS ABSOLUTE: 0.1 K/UL (ref 0–0.6)
EOSINOPHILS RELATIVE PERCENT: 1.1 %
GFR SERPL CREATININE-BSD FRML MDRD: >60 ML/MIN/{1.73_M2}
GLUCOSE BLD-MCNC: 113 MG/DL (ref 70–99)
HCT VFR BLD CALC: 35.1 % (ref 36–48)
HEMOGLOBIN: 11.8 G/DL (ref 12–16)
LYMPHOCYTES ABSOLUTE: 1.5 K/UL (ref 1–5.1)
LYMPHOCYTES RELATIVE PERCENT: 18.3 %
MAGNESIUM: 2 MG/DL (ref 1.8–2.4)
MCH RBC QN AUTO: 28.2 PG (ref 26–34)
MCHC RBC AUTO-ENTMCNC: 33.5 G/DL (ref 31–36)
MCV RBC AUTO: 84 FL (ref 80–100)
MONOCYTES ABSOLUTE: 0.8 K/UL (ref 0–1.3)
MONOCYTES RELATIVE PERCENT: 9.8 %
NEUTROPHILS ABSOLUTE: 5.9 K/UL (ref 1.7–7.7)
NEUTROPHILS RELATIVE PERCENT: 69.6 %
PDW BLD-RTO: 13.9 % (ref 12.4–15.4)
PHOSPHORUS: 3.5 MG/DL (ref 2.5–4.9)
PLATELET # BLD: 512 K/UL (ref 135–450)
PMV BLD AUTO: 7.8 FL (ref 5–10.5)
POTASSIUM SERPL-SCNC: 5.1 MMOL/L (ref 3.5–5.1)
RBC # BLD: 4.18 M/UL (ref 4–5.2)
SODIUM BLD-SCNC: 130 MMOL/L (ref 136–145)
WBC # BLD: 8.5 K/UL (ref 4–11)

## 2022-11-08 PROCEDURE — 6360000002 HC RX W HCPCS: Performed by: NURSE ANESTHETIST, CERTIFIED REGISTERED

## 2022-11-08 PROCEDURE — 6360000002 HC RX W HCPCS: Performed by: STUDENT IN AN ORGANIZED HEALTH CARE EDUCATION/TRAINING PROGRAM

## 2022-11-08 PROCEDURE — 3600000014 HC SURGERY LEVEL 4 ADDTL 15MIN: Performed by: STUDENT IN AN ORGANIZED HEALTH CARE EDUCATION/TRAINING PROGRAM

## 2022-11-08 PROCEDURE — 99232 SBSQ HOSP IP/OBS MODERATE 35: CPT | Performed by: INTERNAL MEDICINE

## 2022-11-08 PROCEDURE — 87176 TISSUE HOMOGENIZATION CULTR: CPT

## 2022-11-08 PROCEDURE — 94760 N-INVAS EAR/PLS OXIMETRY 1: CPT

## 2022-11-08 PROCEDURE — 83735 ASSAY OF MAGNESIUM: CPT

## 2022-11-08 PROCEDURE — 80069 RENAL FUNCTION PANEL: CPT

## 2022-11-08 PROCEDURE — 6360000002 HC RX W HCPCS: Performed by: INTERNAL MEDICINE

## 2022-11-08 PROCEDURE — 87205 SMEAR GRAM STAIN: CPT

## 2022-11-08 PROCEDURE — 6370000000 HC RX 637 (ALT 250 FOR IP): Performed by: STUDENT IN AN ORGANIZED HEALTH CARE EDUCATION/TRAINING PROGRAM

## 2022-11-08 PROCEDURE — 87070 CULTURE OTHR SPECIMN AEROBIC: CPT

## 2022-11-08 PROCEDURE — 85025 COMPLETE CBC W/AUTO DIFF WBC: CPT

## 2022-11-08 PROCEDURE — 2580000003 HC RX 258: Performed by: STUDENT IN AN ORGANIZED HEALTH CARE EDUCATION/TRAINING PROGRAM

## 2022-11-08 PROCEDURE — 0KXR0ZZ TRANSFER LEFT UPPER LEG MUSCLE, OPEN APPROACH: ICD-10-PCS | Performed by: STUDENT IN AN ORGANIZED HEALTH CARE EDUCATION/TRAINING PROGRAM

## 2022-11-08 PROCEDURE — A4217 STERILE WATER/SALINE, 500 ML: HCPCS | Performed by: STUDENT IN AN ORGANIZED HEALTH CARE EDUCATION/TRAINING PROGRAM

## 2022-11-08 PROCEDURE — 7100000000 HC PACU RECOVERY - FIRST 15 MIN

## 2022-11-08 PROCEDURE — 3700000000 HC ANESTHESIA ATTENDED CARE: Performed by: STUDENT IN AN ORGANIZED HEALTH CARE EDUCATION/TRAINING PROGRAM

## 2022-11-08 PROCEDURE — 2720000010 HC SURG SUPPLY STERILE: Performed by: STUDENT IN AN ORGANIZED HEALTH CARE EDUCATION/TRAINING PROGRAM

## 2022-11-08 PROCEDURE — 2100000000 HC CCU R&B

## 2022-11-08 PROCEDURE — 2580000003 HC RX 258: Performed by: INTERNAL MEDICINE

## 2022-11-08 PROCEDURE — 2500000003 HC RX 250 WO HCPCS: Performed by: NURSE ANESTHETIST, CERTIFIED REGISTERED

## 2022-11-08 PROCEDURE — 15738 MUSCLE-SKIN GRAFT LEG: CPT | Performed by: STUDENT IN AN ORGANIZED HEALTH CARE EDUCATION/TRAINING PROGRAM

## 2022-11-08 PROCEDURE — 04RL07Z REPLACEMENT OF LEFT FEMORAL ARTERY WITH AUTOLOGOUS TISSUE SUBSTITUTE, OPEN APPROACH: ICD-10-PCS | Performed by: STUDENT IN AN ORGANIZED HEALTH CARE EDUCATION/TRAINING PROGRAM

## 2022-11-08 PROCEDURE — 2709999900 HC NON-CHARGEABLE SUPPLY: Performed by: STUDENT IN AN ORGANIZED HEALTH CARE EDUCATION/TRAINING PROGRAM

## 2022-11-08 PROCEDURE — 87075 CULTR BACTERIA EXCEPT BLOOD: CPT

## 2022-11-08 PROCEDURE — 3600000004 HC SURGERY LEVEL 4 BASE: Performed by: STUDENT IN AN ORGANIZED HEALTH CARE EDUCATION/TRAINING PROGRAM

## 2022-11-08 PROCEDURE — 7100000011 HC PHASE II RECOVERY - ADDTL 15 MIN

## 2022-11-08 PROCEDURE — 35286 RPR BLVSL GRF OTH/TH VN LXTR: CPT | Performed by: STUDENT IN AN ORGANIZED HEALTH CARE EDUCATION/TRAINING PROGRAM

## 2022-11-08 PROCEDURE — C1768 GRAFT, VASCULAR: HCPCS | Performed by: STUDENT IN AN ORGANIZED HEALTH CARE EDUCATION/TRAINING PROGRAM

## 2022-11-08 PROCEDURE — 3700000001 HC ADD 15 MINUTES (ANESTHESIA): Performed by: STUDENT IN AN ORGANIZED HEALTH CARE EDUCATION/TRAINING PROGRAM

## 2022-11-08 RX ORDER — MIDAZOLAM HYDROCHLORIDE 1 MG/ML
INJECTION INTRAMUSCULAR; INTRAVENOUS PRN
Status: DISCONTINUED | OUTPATIENT
Start: 2022-11-08 | End: 2022-11-08 | Stop reason: SDUPTHER

## 2022-11-08 RX ORDER — HEPARIN SODIUM 10000 [USP'U]/ML
INJECTION, SOLUTION INTRAVENOUS; SUBCUTANEOUS PRN
Status: DISCONTINUED | OUTPATIENT
Start: 2022-11-08 | End: 2022-11-08 | Stop reason: SDUPTHER

## 2022-11-08 RX ORDER — PHENYLEPHRINE HCL IN 0.9% NACL 1 MG/10 ML
SYRINGE (ML) INTRAVENOUS PRN
Status: DISCONTINUED | OUTPATIENT
Start: 2022-11-08 | End: 2022-11-08 | Stop reason: SDUPTHER

## 2022-11-08 RX ORDER — SODIUM CHLORIDE 9 MG/ML
INJECTION, SOLUTION INTRAVENOUS PRN
Status: DISCONTINUED | OUTPATIENT
Start: 2022-11-08 | End: 2022-11-14 | Stop reason: HOSPADM

## 2022-11-08 RX ORDER — DEXMEDETOMIDINE HYDROCHLORIDE 100 UG/ML
INJECTION, SOLUTION INTRAVENOUS PRN
Status: DISCONTINUED | OUTPATIENT
Start: 2022-11-08 | End: 2022-11-08 | Stop reason: SDUPTHER

## 2022-11-08 RX ORDER — LIDOCAINE HYDROCHLORIDE 20 MG/ML
INJECTION, SOLUTION EPIDURAL; INFILTRATION; INTRACAUDAL; PERINEURAL PRN
Status: DISCONTINUED | OUTPATIENT
Start: 2022-11-08 | End: 2022-11-08 | Stop reason: SDUPTHER

## 2022-11-08 RX ORDER — ROCURONIUM BROMIDE 10 MG/ML
INJECTION, SOLUTION INTRAVENOUS PRN
Status: DISCONTINUED | OUTPATIENT
Start: 2022-11-08 | End: 2022-11-08 | Stop reason: SDUPTHER

## 2022-11-08 RX ORDER — SODIUM CHLORIDE 0.9 % (FLUSH) 0.9 %
5-40 SYRINGE (ML) INJECTION PRN
Status: DISCONTINUED | OUTPATIENT
Start: 2022-11-08 | End: 2022-11-08

## 2022-11-08 RX ORDER — PROPOFOL 10 MG/ML
INJECTION, EMULSION INTRAVENOUS PRN
Status: DISCONTINUED | OUTPATIENT
Start: 2022-11-08 | End: 2022-11-08 | Stop reason: SDUPTHER

## 2022-11-08 RX ORDER — SODIUM CHLORIDE 0.9 % (FLUSH) 0.9 %
5-40 SYRINGE (ML) INJECTION PRN
Status: DISCONTINUED | OUTPATIENT
Start: 2022-11-08 | End: 2022-11-14 | Stop reason: HOSPADM

## 2022-11-08 RX ORDER — LORAZEPAM 2 MG/ML
1 INJECTION INTRAMUSCULAR
Status: DISCONTINUED | OUTPATIENT
Start: 2022-11-08 | End: 2022-11-08

## 2022-11-08 RX ORDER — ASPIRIN 81 MG/1
81 TABLET, CHEWABLE ORAL DAILY
Status: DISCONTINUED | OUTPATIENT
Start: 2022-11-08 | End: 2022-11-14 | Stop reason: HOSPADM

## 2022-11-08 RX ORDER — FENTANYL CITRATE 50 UG/ML
INJECTION, SOLUTION INTRAMUSCULAR; INTRAVENOUS PRN
Status: DISCONTINUED | OUTPATIENT
Start: 2022-11-08 | End: 2022-11-08 | Stop reason: SDUPTHER

## 2022-11-08 RX ORDER — ENOXAPARIN SODIUM 100 MG/ML
30 INJECTION SUBCUTANEOUS DAILY
Status: DISCONTINUED | OUTPATIENT
Start: 2022-11-09 | End: 2022-11-14 | Stop reason: HOSPADM

## 2022-11-08 RX ORDER — IPRATROPIUM BROMIDE AND ALBUTEROL SULFATE 2.5; .5 MG/3ML; MG/3ML
1 SOLUTION RESPIRATORY (INHALATION)
Status: DISCONTINUED | OUTPATIENT
Start: 2022-11-08 | End: 2022-11-08

## 2022-11-08 RX ORDER — SODIUM CHLORIDE 0.9 % (FLUSH) 0.9 %
5-40 SYRINGE (ML) INJECTION EVERY 12 HOURS SCHEDULED
Status: DISCONTINUED | OUTPATIENT
Start: 2022-11-08 | End: 2022-11-08

## 2022-11-08 RX ORDER — ONDANSETRON 2 MG/ML
INJECTION INTRAMUSCULAR; INTRAVENOUS PRN
Status: DISCONTINUED | OUTPATIENT
Start: 2022-11-08 | End: 2022-11-08 | Stop reason: SDUPTHER

## 2022-11-08 RX ORDER — KETOROLAC TROMETHAMINE 30 MG/ML
INJECTION, SOLUTION INTRAMUSCULAR; INTRAVENOUS PRN
Status: DISCONTINUED | OUTPATIENT
Start: 2022-11-08 | End: 2022-11-08 | Stop reason: SDUPTHER

## 2022-11-08 RX ORDER — LABETALOL HYDROCHLORIDE 5 MG/ML
10 INJECTION, SOLUTION INTRAVENOUS
Status: DISCONTINUED | OUTPATIENT
Start: 2022-11-08 | End: 2022-11-08

## 2022-11-08 RX ORDER — KETAMINE HYDROCHLORIDE 10 MG/ML
INJECTION, SOLUTION INTRAMUSCULAR; INTRAVENOUS PRN
Status: DISCONTINUED | OUTPATIENT
Start: 2022-11-08 | End: 2022-11-08 | Stop reason: SDUPTHER

## 2022-11-08 RX ORDER — ONDANSETRON 2 MG/ML
4 INJECTION INTRAMUSCULAR; INTRAVENOUS
Status: COMPLETED | OUTPATIENT
Start: 2022-11-08 | End: 2022-11-08

## 2022-11-08 RX ORDER — PROCHLORPERAZINE EDISYLATE 5 MG/ML
5 INJECTION INTRAMUSCULAR; INTRAVENOUS
Status: DISCONTINUED | OUTPATIENT
Start: 2022-11-08 | End: 2022-11-08

## 2022-11-08 RX ORDER — HYDRALAZINE HYDROCHLORIDE 20 MG/ML
10 INJECTION INTRAMUSCULAR; INTRAVENOUS
Status: DISCONTINUED | OUTPATIENT
Start: 2022-11-08 | End: 2022-11-08

## 2022-11-08 RX ORDER — KETOROLAC TROMETHAMINE 15 MG/ML
15 INJECTION, SOLUTION INTRAMUSCULAR; INTRAVENOUS EVERY 6 HOURS
Status: COMPLETED | OUTPATIENT
Start: 2022-11-08 | End: 2022-11-10

## 2022-11-08 RX ORDER — DEXAMETHASONE SODIUM PHOSPHATE 4 MG/ML
INJECTION, SOLUTION INTRA-ARTICULAR; INTRALESIONAL; INTRAMUSCULAR; INTRAVENOUS; SOFT TISSUE PRN
Status: DISCONTINUED | OUTPATIENT
Start: 2022-11-08 | End: 2022-11-08 | Stop reason: SDUPTHER

## 2022-11-08 RX ORDER — SODIUM CHLORIDE 9 MG/ML
INJECTION, SOLUTION INTRAVENOUS PRN
Status: DISCONTINUED | OUTPATIENT
Start: 2022-11-08 | End: 2022-11-08

## 2022-11-08 RX ORDER — SODIUM CHLORIDE 9 MG/ML
INJECTION, SOLUTION INTRAVENOUS CONTINUOUS
Status: DISCONTINUED | OUTPATIENT
Start: 2022-11-08 | End: 2022-11-09

## 2022-11-08 RX ORDER — SODIUM CHLORIDE 0.9 % (FLUSH) 0.9 %
5-40 SYRINGE (ML) INJECTION EVERY 12 HOURS SCHEDULED
Status: DISCONTINUED | OUTPATIENT
Start: 2022-11-08 | End: 2022-11-14 | Stop reason: HOSPADM

## 2022-11-08 RX ADMIN — DEXMEDETOMIDINE HCL 4 MCG: 100 INJECTION INTRAVENOUS at 10:20

## 2022-11-08 RX ADMIN — OXYCODONE HYDROCHLORIDE 10 MG: 10 TABLET ORAL at 20:15

## 2022-11-08 RX ADMIN — DEXMEDETOMIDINE HCL 4 MCG: 100 INJECTION INTRAVENOUS at 09:10

## 2022-11-08 RX ADMIN — OXYCODONE HYDROCHLORIDE 10 MG: 10 TABLET ORAL at 14:55

## 2022-11-08 RX ADMIN — KETOROLAC TROMETHAMINE 15 MG: 15 INJECTION, SOLUTION INTRAMUSCULAR; INTRAVENOUS at 17:37

## 2022-11-08 RX ADMIN — ONDANSETRON 4 MG: 2 INJECTION INTRAMUSCULAR; INTRAVENOUS at 12:17

## 2022-11-08 RX ADMIN — ONDANSETRON 4 MG: 2 INJECTION INTRAMUSCULAR; INTRAVENOUS at 10:48

## 2022-11-08 RX ADMIN — DEXMEDETOMIDINE HCL 4 MCG: 100 INJECTION INTRAVENOUS at 08:29

## 2022-11-08 RX ADMIN — SUGAMMADEX 200 MG: 100 INJECTION, SOLUTION INTRAVENOUS at 11:05

## 2022-11-08 RX ADMIN — Medication 100 MCG: at 08:15

## 2022-11-08 RX ADMIN — ASPIRIN 81 MG 81 MG: 81 TABLET ORAL at 20:15

## 2022-11-08 RX ADMIN — ROCURONIUM BROMIDE 50 MG: 10 INJECTION, SOLUTION INTRAVENOUS at 07:54

## 2022-11-08 RX ADMIN — Medication 20 MG: at 07:50

## 2022-11-08 RX ADMIN — DEXMEDETOMIDINE HCL 4 MCG: 100 INJECTION INTRAVENOUS at 09:23

## 2022-11-08 RX ADMIN — Medication 100 MCG: at 10:56

## 2022-11-08 RX ADMIN — HYDROMORPHONE HYDROCHLORIDE 0.5 MG: 1 INJECTION, SOLUTION INTRAMUSCULAR; INTRAVENOUS; SUBCUTANEOUS at 13:28

## 2022-11-08 RX ADMIN — Medication 10 MG: at 11:40

## 2022-11-08 RX ADMIN — ROCURONIUM BROMIDE 20 MG: 10 INJECTION, SOLUTION INTRAVENOUS at 08:17

## 2022-11-08 RX ADMIN — CEFEPIME 2000 MG: 2 INJECTION, POWDER, FOR SOLUTION INTRAVENOUS at 14:58

## 2022-11-08 RX ADMIN — Medication 100 MCG: at 11:05

## 2022-11-08 RX ADMIN — SODIUM CHLORIDE: 9 INJECTION, SOLUTION INTRAVENOUS at 13:51

## 2022-11-08 RX ADMIN — Medication 10 MG: at 11:01

## 2022-11-08 RX ADMIN — CEFEPIME 2000 MG: 2 INJECTION, POWDER, FOR SOLUTION INTRAVENOUS at 03:36

## 2022-11-08 RX ADMIN — DEXAMETHASONE SODIUM PHOSPHATE 8 MG: 4 INJECTION, SOLUTION INTRAMUSCULAR; INTRAVENOUS at 08:13

## 2022-11-08 RX ADMIN — DEXMEDETOMIDINE HCL 4 MCG: 100 INJECTION INTRAVENOUS at 08:52

## 2022-11-08 RX ADMIN — Medication 10 MG: at 07:53

## 2022-11-08 RX ADMIN — ROCURONIUM BROMIDE 20 MG: 10 INJECTION, SOLUTION INTRAVENOUS at 09:32

## 2022-11-08 RX ADMIN — HYDROMORPHONE HYDROCHLORIDE 0.5 MG: 1 INJECTION, SOLUTION INTRAMUSCULAR; INTRAVENOUS; SUBCUTANEOUS at 12:34

## 2022-11-08 RX ADMIN — MIDAZOLAM 2 MG: 1 INJECTION INTRAMUSCULAR; INTRAVENOUS at 07:44

## 2022-11-08 RX ADMIN — PROPOFOL 150 MG: 10 INJECTION, EMULSION INTRAVENOUS at 07:54

## 2022-11-08 RX ADMIN — VANCOMYCIN HYDROCHLORIDE 750 MG: 750 INJECTION, POWDER, LYOPHILIZED, FOR SOLUTION INTRAVENOUS at 08:03

## 2022-11-08 RX ADMIN — ROCURONIUM BROMIDE 10 MG: 10 INJECTION, SOLUTION INTRAVENOUS at 10:42

## 2022-11-08 RX ADMIN — SODIUM CHLORIDE: 9 INJECTION, SOLUTION INTRAVENOUS at 11:55

## 2022-11-08 RX ADMIN — LIDOCAINE HYDROCHLORIDE 100 MG: 20 INJECTION, SOLUTION EPIDURAL; INFILTRATION; INTRACAUDAL; PERINEURAL at 07:54

## 2022-11-08 RX ADMIN — HEPARIN SODIUM 2000 UNITS: 10000 INJECTION INTRAVENOUS; SUBCUTANEOUS at 09:31

## 2022-11-08 RX ADMIN — KETOROLAC TROMETHAMINE 30 MG: 30 INJECTION, SOLUTION INTRAMUSCULAR at 11:11

## 2022-11-08 RX ADMIN — Medication 10 MG: at 11:08

## 2022-11-08 RX ADMIN — VANCOMYCIN HYDROCHLORIDE 750 MG: 750 INJECTION, POWDER, LYOPHILIZED, FOR SOLUTION INTRAVENOUS at 20:27

## 2022-11-08 RX ADMIN — DEXMEDETOMIDINE HCL 4 MCG: 100 INJECTION INTRAVENOUS at 10:38

## 2022-11-08 RX ADMIN — FENTANYL CITRATE 100 MCG: 50 INJECTION INTRAMUSCULAR; INTRAVENOUS at 11:09

## 2022-11-08 RX ADMIN — HEPARIN SODIUM 3000 UNITS: 10000 INJECTION INTRAVENOUS; SUBCUTANEOUS at 08:46

## 2022-11-08 ASSESSMENT — PAIN DESCRIPTION - ORIENTATION
ORIENTATION: LEFT
ORIENTATION: LEFT

## 2022-11-08 ASSESSMENT — PAIN DESCRIPTION - LOCATION
LOCATION: GROIN;LEG
LOCATION: GROIN

## 2022-11-08 ASSESSMENT — PAIN SCALES - GENERAL
PAINLEVEL_OUTOF10: 9
PAINLEVEL_OUTOF10: 10
PAINLEVEL_OUTOF10: 8
PAINLEVEL_OUTOF10: 8

## 2022-11-08 ASSESSMENT — PAIN DESCRIPTION - ONSET: ONSET: ON-GOING

## 2022-11-08 ASSESSMENT — LIFESTYLE VARIABLES: SMOKING_STATUS: 1

## 2022-11-08 ASSESSMENT — PAIN DESCRIPTION - FREQUENCY: FREQUENCY: CONTINUOUS

## 2022-11-08 ASSESSMENT — PAIN DESCRIPTION - DESCRIPTORS
DESCRIPTORS: ACHING;STABBING
DESCRIPTORS: ACHING;PRESSURE;THROBBING

## 2022-11-08 ASSESSMENT — PAIN - FUNCTIONAL ASSESSMENT
PAIN_FUNCTIONAL_ASSESSMENT: 0-10
PAIN_FUNCTIONAL_ASSESSMENT: PREVENTS OR INTERFERES SOME ACTIVE ACTIVITIES AND ADLS
PAIN_FUNCTIONAL_ASSESSMENT: PREVENTS OR INTERFERES SOME ACTIVE ACTIVITIES AND ADLS

## 2022-11-08 ASSESSMENT — PAIN DESCRIPTION - PAIN TYPE: TYPE: SURGICAL PAIN

## 2022-11-08 NOTE — OP NOTE
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 OPERATION PERFORMED:  1. Left common femoral artery replacement using a arterial homograft graft. 2.  Sartorius muscle flap. SURGEON:  Uzma Gaitan DO    ASSISTANT:  Timi Ovalle. ESTIMATED BLOOD LOSS:  300 mL. COMPLICATIONS:  None apparent. ANESTHESIA:  General.    INDICATIONS:  This is a 59-year-old female patient who injects IV  medicine, narcotics, and also cocaine into her veins. She has used up  most of her veins in her lower and upper extremities and therefore, she  was injected into her groin. She has now developed a left femoral  pseudoaneurysm with cellulitis. She went to the hospital where a CT  angiogram demonstrated that she had an obliterated common femoral  artery. She had no demonstrable saphenous vein on bedside ultrasound. The options were placed cadaveric vessel versus an extra-anatomic bypass  iliac bypass. She opted for cadaveric vein placement and we discussed  this. This would hopefully mitigate against infection if there was  Active infection of the pseudoaneurysm. The patient had some cellulitis, but had no evidence of  septicemia. All the risks, benefits, and alternatives to surgical  replacement were clearly reviewed with the patient. The risks include  pain, infection, bleeding, embolization, thrombosis, and pseudoaneurysm.   The risks of anesthesia include MI, stroke, death, respiratory failure,  renal failure. She understood and gave written informed consent. OPERATIVE PROCEDURE:  The patient was brought into the operating room  and placed supine on the table. She underwent general anesthesia. She  underwent preoperative antibiotic placement. All bony prominences were  appropriately padded. The left leg as well as the right groin were  prepped and draped in sterile fashion using chlorhexidine based  solution. Time-out was called for indication, patient, and laterality. I began by making an incision cephalad to the actual pulsatile mass  which was in her left groin. I dissected down through skin and  subcutaneous tissue. I identified the inguinal ligament. I went  cephalad to this and released the actual fascia of the external oblique  and identified the retroperitoneal space and I migrated my finger down  until I mobilized the round ligament to the patient's medial aspect. I  identified the external iliac artery and placed a vessel loop around it  for control. I then made an additional incision inferior to the actual  pulsatile mass below the groin crease. I dissected down through skin  and subcutaneous tissue. I identified the actual superficial femoral  artery near its base after rotating the sartorius laterally. After I  isolated the superficial femoral artery, I then dissected down  posteriorly and identified the actual profunda femoris, one of the main  branches, knowing full well there was actually going to be an additional  branch that could not be readily controlled. After this, I then  proceeded to connect the incisions in a longitudinal fashion and  identified the pseudoaneurysm capsule. I went ahead and gave the  patient 3000 units of heparin and additional 2000 for the anastomoses and  I went ahead and occluded the vessels that I had controlled. I then  proceeded to enter the actual aneurysm capsule.   There was a significant  amount of inflammatory rind with significant dented tissue to the  artery. This suggests this has been going on for a long time. She had  originally said it was only going on for seven days, but this likely has  been going on for months, maybe even a year or so. After meticulous and  careful dissection, which took about an hour, I identified the actual  arterial wall. It was completely obliterated. There was an additional  hole down the actual proximal SFA. We did select a matched 8-mm  cadaveric artery graft. It was O positive and she is O positive as well  in terms of blood types. After we had obtained reasonable control, I  did have to use balloon occlusion catheters on occasion as well as the  actual clamps. I then proceeded to bring up the cadaveric vein graft. I performed a proximal anastomosis to the distal external iliac artery  using a running 5-0 Prolene suture and de-aired it before tying it down. I flushed the graft. I placed additional sutures necessary for  hemostasis. After this, I then proceeded to distend it on the field to  decide which was best to either perform a direct anastomosis to the  profunda and a jump graft to the SFA or perform the SFA anastomosis  first.  Because I had to take out a significant portion of the SFA as  well, I then proceeded to go ahead and perform the distal anastomosis. I had enough mobility of the actual profunda femoris. I did have to  ligate an additional branch of the profunda in order to actually gain  mobilization, but I had enough mobilization that I could re-implant it  back onto the actual cadaveric artery graft. I isolated the  SFA and performed the distal anastomosis using running 5-0 Prolene  suture after tapering the graft and the artery accordingly. I then  proceeded to re-implant the profunda femoris.   I had to rotate the graft  a little bit using the clamps in order to actually place it on the more  posterolateral aspect of the actual cadaveric artery graft. I performed  an arteriotomy and spatulated the actual vessel and reimplanted the  profunda femoris using a running 6-0 Prolene suture. I deaired this  also before tying it down. After hemostasis was achieved, the graft  looked to be more or less native reconstructed arterial vessel. I  therefore proceeded to mobilize the sartorius. I mobilized the lateral  surface in order to avoid injuring the blood supply. I isolated  throughout the entire length of the actual wound itself. Once I  released it from the lateral aspect, I then released its attachments to  the ASIS. I was able to fold over the sartorius muscle onto the  inguinal ligament covering the femoral vessels using interrupted 2-0 PDS  suture. After this was completed, we then reapproximated the deep  tissues with interrupted Vicryl suture. A 15-Kiswahili John drain was  placed in the actual sartorius mobilization space. The skin was  reapproximated using interrupted nylon suture. A KALYN dressing was  placed in the groin. The patient will remain on bedrest for three days. There were no immediate complications. She was taken to the ICU in  stable condition. I was present for all critical aspects of this  procedure. Sponge and needle counts were correct x2.         Roberto Guerrier DO    D: 11/08/2022 11:29:55       T: 11/08/2022 13:52:15     [email or web address]  Job#: [id number]     Doc#: [id number]    CC:

## 2022-11-08 NOTE — PLAN OF CARE
Problem: Discharge Planning  Goal: Discharge to home or other facility with appropriate resources  11/8/2022 0048 by Lizandro Rivers RN  Outcome: Progressing  Flowsheets (Taken 11/8/2022 0020)  Discharge to home or other facility with appropriate resources: Identify barriers to discharge with patient and caregiver  11/7/2022 1206 by Carmen Chaudhari RN  Outcome: Progressing     Problem: Safety - Adult  Goal: Free from fall injury  11/8/2022 0048 by Lizandro Rivers RN  Outcome: Progressing  11/7/2022 1206 by Carmen Chaudhari RN  Outcome: Progressing     Problem: Pain  Goal: Verbalizes/displays adequate comfort level or baseline comfort level  11/8/2022 0048 by Lizandro Rivers RN  Outcome: Progressing  11/7/2022 1206 by Carmen Chaudhari RN  Outcome: Progressing     Problem: Respiratory - Adult  Goal: Achieves optimal ventilation and oxygenation  Outcome: Progressing  Flowsheets (Taken 11/8/2022 0020)  Achieves optimal ventilation and oxygenation:   Assess for changes in respiratory status   Assess for changes in mentation and behavior   Position to facilitate oxygenation and minimize respiratory effort     Problem: Cardiovascular - Adult  Goal: Maintains optimal cardiac output and hemodynamic stability  Outcome: Progressing  Flowsheets (Taken 11/8/2022 0020)  Maintains optimal cardiac output and hemodynamic stability:   Monitor blood pressure and heart rate   Monitor urine output and notify Licensed Independent Practitioner for values outside of normal range     Problem: Infection - Adult  Goal: Absence of infection at discharge  Outcome: Progressing  Flowsheets (Taken 11/8/2022 0020)  Absence of infection at discharge:   Assess and monitor for signs and symptoms of infection   Monitor lab/diagnostic results   Monitor all insertion sites i.e., indwelling lines, tubes and drains     Problem: Hematologic - Adult  Goal: Maintains hematologic stability  Outcome: Progressing  Flowsheets (Taken 11/8/2022 0020)  Maintains hematologic stability: Assess for signs and symptoms of bleeding or hemorrhage

## 2022-11-08 NOTE — PROGRESS NOTES
Infectious Disease Follow up Notes  Admit Date: 11/4/2022  Hospital Day: 5    Antibiotics :   IV Vancomycin   IV Cefepime     CHIEF COMPLAINT:     Left femoral artery Pseudoaneurysm  Left groin cellulitis   IVDA    Subjective interval History :  47 y.o. woman with a past history significant for hep C, IV drug abuse, history of DVT in the past, not on anticoagulation currently admitted to the hospital secondary to left groin pain and localized swelling with associated lymphadenopathy. Patient has been injecting drugs for a long time she been using all the veins she admits to injecting into the left groin she missed her vein and hit her artery in the past.  She was using opiates in the past and now started using cocaine and Amphetamines her last use was before coming in to  hospital.  Patient has been using profanity during consultation and very irritable. She was already evaluated by vascular surgery. Blood cultures are obtained are in process. CTA of the left lower extremity in the ED indicated left distal femoral artery pseudoaneurysm with underlying local changes also associated adenopathy likely reactive lymph nodes in the groin. She was seen in the ED on 10/1/22 for the pain in the groin but she eloped from the ED before being evaluated. Labs indicate creatinine 1.1, UDS positive for cocaine amphetamine, WBC 10.5 hemoglobin 11.1 platelets 428.   Location :   Left groin swelling pain localized adenopathy  Quality : Aching      Severity : 10/10    Duration :  1 week     Timing : constant   Context :  IVDA   Modifying factors :None   Associated signs and symptoms: no FEVERS, no chills local groin pain and swelling+           Interval History : seen in CVICU s/p Left femoral artery Pseudoaneurysm repair with Homograft and sartorius flap OR cx in process - now has drain in place and tolerating IV abx ok        Past Medical History: Past Medical History:   Diagnosis Date    Hepatitis C without hepatic coma     Hx of blood clots     DEEP VEIN THROMBOSIS FROM IV DRUG USE    IV drug abuse ([facility] [address]     None for 5 mos    Methadone use        Past Surgical History:    Past Surgical History:   Procedure Laterality Date    BREAST BIOPSY Right 2015    Excisional     SECTION      TUBAL LIGATION         Current Medications:    Outpatient Medications Marked as Taking for the 22 encounter [facility] HOSPITAL Encounter)   Medication Sig Dispense Refill    ibuprofen (ADVIL;MOTRIN) 200 MG tablet Take 800 mg by mouth in the morning, at noon, in the evening, and at bedtime      acetaminophen (TYLENOL) 500 MG tablet Take 1,000-2,000 mg by mouth in the morning, at noon, in the evening, and at bedtime         Allergies:  Pcn [penicillins]    Immunizations :   Immunization History   Administered Date(s) Administered    COVID-19, PFIZER PURPLE top, DILUTE for use, (age 15 y+), 30mcg/0.3mL 2021    Tdap (Boostrix, Adacel) 2020       Social History:      Social History     Tobacco Use    Smoking status: Every Day     Packs/day: 1.00     Years: 20.00     Pack years: 20.00     Types: Cigarettes    Smokeless tobacco: Never   Substance Use Topics    Alcohol use: Yes    Drug use: Yes     Frequency: 1.0 times per week     Types: IV, Cocaine     Social History     Tobacco Use   Smoking Status Every Day    Packs/day: 1.00    Years: 20.00    Pack years: 20.00    Types: Cigarettes   Smokeless Tobacco Never      Family History  : no DVT no COPD        REVIEW OF SYSTEMS:      Constitutional:  negative for fevers, chills, night sweats  Eyes:  negative for blurred vision, eye discharge, visual disturbance   HEENT:  negative for hearing loss, ear drainage,nasal congestion  Respiratory:  negative for cough, shortness of breath or hemoptysis   Cardiovascular:  negative for chest pain, palpitations, syncope  Gastrointestinal:  negative for nausea, vomiting, diarrhea, constipation, abdominal pain  Genitourinary:  negative for frequency, dysuria, urinary incontinence, hematuria  Hematologic/Lymphatic:  negative for easy bruising, bleeding and lymphadenopathy  Allergic/Immunologic:  negative for recurrent infections, angioedema, anaphylaxis   Endocrine:  negative for weight changes, polyuria, polydipsia and polyphagia  Musculoskeletal:  lEFT GROIN  pain + , swelling+ , decreased range of motion  Integumentary: No rashes, skin lesions  Neurological:  negative for headaches, slurred speech, unilateral weakness  Psychiatric: negative for hallucinations,confusion,agitation.                 PHYSICAL EXAM:      Vitals:    BP (!) 155/94   Pulse (!) 105   Temp 97 °F (36.1 °C) (Temporal)   Resp 18   Ht 5' 2\" (1.575 m)   Wt 107 lb 12.9 oz (48.9 kg)   SpO2 97%   BMI 19.72 kg/m²     General Appearance: alert,in some  acute distress, no pallor, no icterus  needle marks+ un cooperative   Skin: warm and dry, no rash or erythema  Head: normocephalic and atraumatic  Eyes: pupils equal, round, and reactive to light, conjunctivae normal  ENT: tympanic membrane, external ear and ear canal normal bilaterally, nose without deformity, nasal mucosa and turbinates normal without polyps  Neck: supple and non-tender without mass, no thyromegaly  no cervical lymphadenopathy  Pulmonary/Chest: clear to auscultation bilaterally- no wheezes, rales or rhonchi, normal air movement, no respiratory distress  Cardiovascular: normal rate, regular rhythm, normal S1 and S2, no murmurs, rubs, clicks, or gallops, no carotid bruits  Abdomen: soft, non-tender, non-distended, normal bowel sounds, no masses or organomegaly  Extremities: no cyanosis, clubbing or edema  Musculoskeletal: normal range of motion, no joint swelling, deformity or tenderness  Integumentary: No rashes, no abnormal skin lesions, no petechiae  Neurologic: reflexes normal and symmetric, no cranial nerve deficit  Psych:  Orientation, sensorium, mood irritable           Lines: IV  Left groin post op dressing  + and drain in place++ good pedal pulses +      Data Review:    CBC:   Lab Results   Component Value Date    WBC 8.5 11/08/2022    HGB 11.8 (L) 11/08/2022    HCT 35.1 (L) 11/08/2022    MCV 84.0 11/08/2022     (H) 11/08/2022     RENAL:   Lab Results   Component Value Date    CREATININE 0.7 11/07/2022    BUN 7 11/07/2022     (L) 11/07/2022    K 4.5 11/07/2022    CL 99 11/07/2022    CO2 25 11/07/2022     SED RATE:   Lab Results   Component Value Date/Time    SEDRATE 57 11/05/2022 04:03 AM     CK: No results found for: CKTOTAL  CRP:   Lab Results   Component Value Date/Time    CRP 8.8 11/05/2022 04:03 AM     Hepatic Function Panel:   Lab Results   Component Value Date/Time    ALKPHOS 113 11/04/2022 01:16 AM    ALT 13 11/04/2022 01:16 AM    AST 21 11/04/2022 01:16 AM    PROT 9.0 11/04/2022 01:16 AM    BILITOT <0.2 11/04/2022 01:16 AM    BILIDIR 0.3 11/16/2019 12:58 AM    IBILI 0.5 11/16/2019 12:58 AM    LABALBU 3.6 11/07/2022 05:50 AM     UA:  Lab Results   Component Value Date/Time    COLORU Yellow 01/01/2019 09:39 PM    CLARITYU Clear 01/01/2019 09:39 PM    GLUCOSEU Negative 01/01/2019 09:39 PM    BILIRUBINUR Negative 01/01/2019 09:39 PM    KETUA 15 01/01/2019 09:39 PM    SPECGRAV >=1.030 01/01/2019 09:39 PM    BLOODU Negative 01/01/2019 09:39 PM    PHUR 6.5 11/05/2022 12:09 PM    PROTEINU Negative 01/01/2019 09:39 PM    UROBILINOGEN 0.2 01/01/2019 09:39 PM    NITRU Negative 01/01/2019 09:39 PM    LEUKOCYTESUR Negative 01/01/2019 09:39 PM    LABMICR Not Indicated 01/01/2019 09:39 PM    URINETYPE Not Specified 01/01/2019 09:39 PM      Urine Microscopic: No results found for: LABCAST, BACTERIA, COMU, HYALCAST, WBCUA, RBCUA, EPIU  Urine Reflex to Culture:   Lab Results   Component Value Date/Time    URRFLXCULT Not Indicated 01/01/2019 09:39 PM         Ref Range & Units 11/04/22 1020   Amphetamine Screen, Urine Negative <1000ng/mL POSITIVE Abnormal     Comment: High concentrations of ephedrine/pseudoephedrine or   phenylpropanolamine may cause false positive results   for amphetamine. Therefore, confirmatory testing for   amphetamine should be considered if clinically indicated. Barbiturate Screen, Ur Negative <200 ng/mL Neg    Benzodiazepine Screen, Urine Negative <200 ng/mL Neg    Cannabinoid Scrn, Ur Negative <50 ng/mL Neg    Cocaine Metabolite Screen, Urine Negative <300 ng/mL POSITIVE Abnormal       MICRO: cultures reviewed and updated by me   Blood Culture:   Lab Results   Component Value Date/Time    BC No Growth after 4 days of incubation. 11/04/2022 02:50 AM       Respiratory Culture:  No results found for: Anupama Com[place]  AFB:No results found for: AFBSMEAR  Viral Culture:  No results found for: COVID19  Urine Culture: No results for input(s): Elfida Dominion in the last 72 hours. IMAGING:    CTA LOWER EXTREMITY LEFT W CONTRAST   Final Result   1. Large pseudoaneurysm of the distal left femoral artery likely correlating   to history of IV drug abuse. 2. There is interval thickening and inflammation along the femoral artery and   the proximal superficial femoral artery suggesting vasculitis in association   with above. 3. Cellulitis of the left groin with multiple reactive enlarged lymph nodes.            Component Ref Range & Units 11/05/22 1255   HIV Ag/Ab Non-reactive Non-Reactive    HIV-1 Antibody Non-reactive Non-Reactive    HIV ANTIGEN Non-reactive Non-Reactive    HIV-2 Ab Non-reactive Non-Reactive    Resulting Agency  [facility]        Specimen Collected: 11/05/22 1255 Last Resulted: 11/05/22 1446 View Other Order Details          All the pertinent images and reports for the current Hospitalization were reviewed by me     Scheduled Meds:   vancomycin  750 mg IntraVENous Q12H    sodium chloride flush  5-40 mL IntraVENous 2 times per day    [Held by provider] enoxaparin  30 mg SubCUTAneous Daily    cefepime 2,000 mg IntraVENous Q12H    vancomycin (VANCOCIN) intermittent dosing (placeholder)   Other RX Placeholder    nicotine  1 patch TransDERmal Daily    lidocaine 1 % injection  5 mL IntraDERmal Once       Continuous Infusions:   sodium chloride 125 mL/hr at 11/08/22 1155       PRN Meds:  calcium carbonate, sodium chloride flush, sodium chloride, polyethylene glycol, acetaminophen **OR** acetaminophen, ondansetron, oxyCODONE **OR** oxyCODONE      Assessment:     Patient Active Problem List   Diagnosis    Breast mass, right    Cellulitis    Pseudoaneurysm (HCC)    Pseudoaneurysm of femoral artery (HCC)    IV drug abuse (HCC)    Inguinal adenopathy    Hep C w/o coma, chronic (HCC)    Elevated erythrocyte sedimentation rate    CRP elevated     Left Distal Femoral artery large Pseudo aneurysm+  IVDA on going  H/o Direct Needle injection into Left groin  Cocaine abuse  Amphetamine abuse  Hep C+Ve  Left inguinal adenopathy  BMI at  21   CTA very abnormal involving the Left distal femoral artery Pseudo aneurysm         Unfortunately major complication from direct needle injury to Leti femoral artery and IVDA has been using Cocaine and Amphetamine - seen by vascular team concerned about rupture and bleed - will need IV abx given the presentation concern for local infection she has no fever or reactive WBC so no direct bacterial invasion yet - possible local reaction risk for seeding and systemic spread       Blood cx -ve and HIV screen -ve    S/p Left Femoral artery Pseudoaneurysm surgery and drain in place OR cx in process     Labs, Microbiology, Radiology and all the pertinent results from current hospitalization and  care every where were reviewed  by me as a part of the evaluation   Plan:   Cont IV Vancomycin  x 750 mg q 12 HRS  IV Cefepime x 2 gm q 12 HR  Blood cx NGTD  ESR 57 , CRP 8.8   HIV screen  -ve  Vascular surgery following   Watch for Withdrawal   S/p  surgery and OR tissue cx in process  Depending on her progress will see if we can choose oral abx when ready           Discussed with patient/Family and Nursing   Risk of Complications/Morbidity: High      Illness(es)/ Infection present that pose threat to bodily function. There is potential for severe exacerbation of infection/side effects of treatment. Therapy requires intensive monitoring for antimicrobial agent toxicity. Discussed with patient/Family and Nursing staff     Thanks for allowing me to participate in your patient's care and please call me with any questions or concerns.     Tessa Altman MD  Infectious Disease  [facility]) Physician  Phone: [phone]   Fax : [phone]

## 2022-11-08 NOTE — PROGRESS NOTES
Hospitalist Progress Note      PCP: Dominik Mello MD    Date of Admission: 11/4/2022      Hospital Course: Patient with history of IV drug use admitted with left groin pain and swelling, found to have a large pseudoaneurysm due to IV drug use, underwent left common femoral artery replacement using a cadaveric vein graft with sartorius muscle flap on 11/8/2022. Subjective: Patient seen and examined postoperatively. Complains of pain in left groin. Medicated per MAR. Medications:  Reviewed    Infusion Medications    sodium chloride 125 mL/hr at 11/08/22 1155     Scheduled Medications    vancomycin  750 mg IntraVENous Q12H    sodium chloride flush  5-40 mL IntraVENous 2 times per day    [Held by provider] enoxaparin  30 mg SubCUTAneous Daily    cefepime  2,000 mg IntraVENous Q12H    vancomycin (VANCOCIN) intermittent dosing (placeholder)   Other RX Placeholder    nicotine  1 patch TransDERmal Daily    lidocaine 1 % injection  5 mL IntraDERmal Once     PRN Meds: calcium carbonate, sodium chloride flush, sodium chloride, polyethylene glycol, acetaminophen **OR** acetaminophen, ondansetron, oxyCODONE **OR** oxyCODONE      Intake/Output Summary (Last 24 hours) at 11/8/2022 1336  Last data filed at 11/8/2022 1246  Gross per 24 hour   Intake --   Output 500 ml   Net -500 ml       Physical Exam Performed:    /77   Pulse 91   Temp 98.1 °F (36.7 °C) (Oral)   Resp 20   Ht 5' 2\" (1.575 m)   Wt 107 lb 12.9 oz (48.9 kg)   SpO2 93%   BMI 19.72 kg/m²     General appearance: No apparent distress, appears stated age and cooperative. HEENT: Pupils equal, round, and reactive to light. Conjunctivae/corneas clear. Respiratory:  Normal respiratory effort. Clear to auscultation  Cardiovascular: Regular rate and rhythm with normal S1/S2   Abdomen: Soft, non-tender, non-distended  Musculoskeletal: No edema bilaterally.   Dressing over left groin surgical wound clean and dry  Neurologic:  grossly non-focal.  Psychiatric: Alert and oriented, thought content appropriate, normal insight        Labs:   Recent Labs     11/06/22  0530 11/07/22  0550 11/08/22  0618   WBC 7.6 6.2 8.5   HGB 10.7* 11.2* 11.8*   HCT 32.3* 32.3* 35.1*   * 452* 512*     Recent Labs     11/06/22  0530 11/07/22  0550 11/08/22  0618   * 135* 130*   K 4.2 4.5 5.1   CL 96* 99 95*   CO2 26 25 28   BUN 10 7 11   CREATININE 0.6 0.7 0.8   CALCIUM 8.7 9.5 9.8   PHOS 2.5 3.6 3.5     No results for input(s): AST, ALT, BILIDIR, BILITOT, ALKPHOS in the last 72 hours. No results for input(s): INR in the last 72 hours. No results for input(s): Laban Beaumont in the last 72 hours. Urinalysis:      Lab Results   Component Value Date/Time    NITRU Negative 01/01/2019 09:39 PM    BLOODU Negative 01/01/2019 09:39 PM    SPECGRAV >=1.030 01/01/2019 09:39 PM    GLUCOSEU Negative 01/01/2019 09:39 PM       Radiology:  CTA LOWER EXTREMITY LEFT W CONTRAST   Final Result   1. Large pseudoaneurysm of the distal left femoral artery likely correlating   to history of IV drug abuse. 2. There is interval thickening and inflammation along the femoral artery and   the proximal superficial femoral artery suggesting vasculitis in association   with above. 3. Cellulitis of the left groin with multiple reactive enlarged lymph nodes.                  Assessment/Plan:    Active Hospital Problems    Diagnosis     IV drug abuse ([facility] [address] [F19.10]      Priority: Medium    Inguinal adenopathy [R59.0]      Priority: Medium    Hep C w/o coma, chronic (HCC) [B18.2]      Priority: Medium    Elevated erythrocyte sedimentation rate [R70.0]      Priority: Medium    CRP elevated [R79.82]      Priority: Medium    Cellulitis [L03.90]      Priority: Medium    Pseudoaneurysm (HCC) [I72.9]      Priority: Medium    Pseudoaneurysm of femoral artery (HCC) [I72.4]      Priority: Medium     Left groin large pseudoaneurysm due to IV drug use status post repair by vascular team on 11/8/2022  Postoperative care recommendations by vascular team  Symptomatic management of pain  IV antibiotics per ID recommendations  Hyponatremia: Continue gentle IV fluid hydration, monitor BMP  Monitor for any signs of drug withdrawal  Supportive care  Monitor I's and O's    DVT Prophylaxis: Lovenox  Diet: Diet NPO Exceptions are: Sips of Water with Meds  Code Status: Full Code  PT/OT     Dispo -possible discharge home in 2 to 3 days pending progress.   And final recommendations from consult teams           Doe Bolanos MD

## 2022-11-08 NOTE — PROGRESS NOTES
Pt arrived to room 1307 from OR. Patient arrived on NC oxygen at 2L. Pt sedate from surgey. RR WNL. Pt alert to voice. Nguyen catheter intact. Left groin incision clean dry and intact, with KALYN dressing and JOLEEN drain. JOLEEN drain compressed with bright red drainage. RN notified by anesthesia that a few teeth fell out during case. Teeth in a cup at the bedside. Patient reporting pain 10/10. PRN medications given per MAR. Orientation questions asked to patient, alert and oriented x4. Patient able to move all extremities freely. Kris 10. Bedside handoff performed with Avery Wiley RN to assume care.     Electronically signed by Prem Michelle RN on 11/8/2022 at 1:37 PM

## 2022-11-08 NOTE — H&P
H&P Update    I have reviewed the history and physical and examined the patient and find no relevant changes. I have reviewed with the patient and/or family the risks, benefits, and alternatives to the procedure. I HAVE PRESENTED REASONABLE ALTERNATIVES TO THE PATIENT'S PROPOSED CARE, TREATMENT, AND SERVICES. THE DISCUSSION I HAVE DONE ENCOMPASSED RISKS, BENEFITS, AND SIDE EFFECTS RELATED TO THE ALTERNATIVES AND THE RISKS RELATED TO NOT RECEIVING THE PROPOSED CARE, TREATMENT, SERVICES. Patient:  Marcial Lentz   :   1970    Intended Procedure: left femoral artery replacement, muscle flap    Vitals:    22 0645   BP: (!) 155/94   Pulse: (!) 105   Resp: 18   Temp: 97 °F (36.1 °C)   SpO2: 97%       Nursing notes reviewed and agreed. Medications reviewed  Allergies:    Allergies   Allergen Reactions    Pcn [Penicillins] Hives         Post Procedure plan: cvu    Vonda Vides DO, FACS, FSVS, [facility]

## 2022-11-08 NOTE — ANESTHESIA PRE PROCEDURE
Department of Anesthesiology  Preprocedure Note       Name:  Ani Renee   Age:  47 y.o.  :  1970                                          MRN:  [id number]         Date:  2022      Surgeon: [address]):  Caroline Gallegos DO    Procedure: Procedure(s):  LEFT FEMORAL ARTERY REPLACEMENT, SARTORIUS FLAP    Medications prior to admission:   Prior to Admission medications    Medication Sig Start Date End Date Taking?  Authorizing Provider   ibuprofen (ADVIL;MOTRIN) 200 MG tablet Take 800 mg by mouth in the morning, at noon, in the evening, and at bedtime   Yes Historical Provider, MD   acetaminophen (TYLENOL) 500 MG tablet Take 1,000-2,000 mg by mouth in the morning, at noon, in the evening, and at bedtime   Yes Historical Provider, MD       Current medications:    Current Facility-Administered Medications   Medication Dose Route Frequency Provider Last Rate Last Admin    vancomycin (VANCOCIN) 750 mg in dextrose 5 % 250 mL IVPB  750 mg IntraVENous Q12H Nixon Mckeon MD   Stopped at 22    calcium carbonate (TUMS) chewable tablet 500 mg  500 mg Oral TID PRN Nixon Mckeon MD        sodium chloride flush 0.9 % injection 5-40 mL  5-40 mL IntraVENous 2 times per day Farshad  Annabel, DO   10 mL at 22 0834    sodium chloride flush 0.9 % injection 5-40 mL  5-40 mL IntraVENous PRN Farshad  Annabel, DO        0.9 % sodium chloride infusion   IntraVENous PRN Farshad  Annabel, DO   Stopped at 22 1839    [Held by provider] enoxaparin Sodium (LOVENOX) injection 30 mg  30 mg SubCUTAneous Daily Farshad  Annabel, DO   30 mg at 22 0833    polyethylene glycol (GLYCOLAX) packet 17 g  17 g Oral Daily PRN Farshad  Annabel, DO   17 g at 22 0854    acetaminophen (TYLENOL) tablet 650 mg  650 mg Oral Q6H PRN Farshad  Annabel, DO   650 mg at 22 0451    Or    acetaminophen (TYLENOL) suppository 650 mg  650 mg Rectal Q6H PRN Farshad Valorie Sheikh,         ondansetron (ZOFRAN) Vitals:    11/07/22 2042 11/07/22 2334 11/08/22 0335 11/08/22 0645   BP:  (!) 143/93 (!) 147/98 (!) 155/94   Pulse:  (!) 109 (!) 107 (!) 105   Resp: 16 18 18 18   Temp:  98.4 °F (36.9 °C) 98 °F (36.7 °C) 97 °F (36.1 °C)   TempSrc:  Oral  Temporal   SpO2:  97% 97% 97%   Weight:   107 lb 12.9 oz (48.9 kg)    Height:                                                  BP Readings from Last 3 Encounters:   11/08/22 (!) 155/94   10/29/22 (!) 161/97   07/13/20 (!) 149/91       NPO Status: Time of last liquid consumption: 2330                        Time of last solid consumption: 2200                        Date of last liquid consumption: 11/07/22                        Date of last solid food consumption: 11/07/22    BMI:   Wt Readings from Last 3 Encounters:   11/08/22 107 lb 12.9 oz (48.9 kg)   10/29/22 113 lb 8.6 oz (51.5 kg)   07/13/20 118 lb 6.4 oz (53.7 kg)     Body mass index is 19.72 kg/m². CBC:   Lab Results   Component Value Date/Time    WBC 6.2 11/07/2022 05:50 AM    RBC 3.86 11/07/2022 05:50 AM    HGB 11.2 11/07/2022 05:50 AM    HCT 32.3 11/07/2022 05:50 AM    MCV 83.6 11/07/2022 05:50 AM    RDW 13.9 11/07/2022 05:50 AM     11/07/2022 05:50 AM       CMP:   Lab Results   Component Value Date/Time     11/07/2022 05:50 AM    K 4.5 11/07/2022 05:50 AM    K 3.7 11/16/2019 12:58 AM    CL 99 11/07/2022 05:50 AM    CO2 25 11/07/2022 05:50 AM    BUN 7 11/07/2022 05:50 AM    CREATININE 0.7 11/07/2022 05:50 AM    GFRAA >60 11/16/2019 12:58 AM    AGRATIO 0.8 11/04/2022 01:16 AM    LABGLOM >60 11/07/2022 05:50 AM    GLUCOSE 114 11/07/2022 05:50 AM    PROT 9.0 11/04/2022 01:16 AM    CALCIUM 9.5 11/07/2022 05:50 AM    BILITOT <0.2 11/04/2022 01:16 AM    ALKPHOS 113 11/04/2022 01:16 AM    AST 21 11/04/2022 01:16 AM    ALT 13 11/04/2022 01:16 AM       POC Tests: No results for input(s): POCGLU, POCNA, POCK, POCCL, POCBUN, POCHEMO, POCHCT in the last 72 hours.     Coags: No results found for: PROTIME, INR, APTT    HCG (If Applicable):   Lab Results   Component Value Date    PREGTESTUR Negative 11/05/2022        ABGs: No results found for: PHART, PO2ART, SFH8VCX, PYR9DKN, BEART, E2QBFDRJ     Type & Screen (If Applicable):  No results found for: LABABO, LABRH    Drug/Infectious Status (If Applicable):  No results found for: HIV, HEPCAB    COVID-19 Screening (If Applicable): No results found for: COVID19        Anesthesia Evaluation    Airway: Mallampati: II  TM distance: >3 FB     Mouth opening: > = 3 FB   Dental:      Comment: Denies loose teeth    Pulmonary:   (+) decreased breath sounds current smoker    (-) wheezes and rales                           Cardiovascular:        (-) peripheral edema and no hyperlipidemiaHypertension: no home meds. Rhythm: regular  Rate: normal                 ROS comment: EKG:  Sinus tachycardia   Probable left atrial enlargement   Borderline repolarization abnormality        Neuro/Psych:   (+) psychiatric history (Polysubstance abuse):depression/anxiety    (-) seizures, TIA and CVA            ROS comment: Chronic opiate use: history of heroin use, no longer taking methadone GI/Hepatic/Renal:   (+) hepatitis: C, liver disease:,          ROS comment: CT Abdomen / Pelvis:  Impression:  1 . 5.4 x 3.4 cm bilobed left ovarian cyst, likely a functional/physiologic cysts, however, given the large size recommend follow-up ultrasound in 6 weeks. 2. Irregular tree-in-bud nodular opacities in the left lower lobe is most likely infectious/inflammatory. 3. 1.5 cm simple cyst in the left hepatic lobe. 4. 6 mm hypodense lesion in the posterior superior right kidney is too small to characterize but likely a simple cyst.  5. Mild bladder wall thickening is likely due to incomplete bladder distention. 6. Mild colonic diverticulosis without evidence of diverticulitis.     .   Endo/Other:    (+) blood dyscrasia (Antibodies present on T&S)::., .                  ROS comment: h/o IV drug use, heroin  History of polysubstance abuse: amphetamines + cocaine Abdominal:         (-) obese Abdomen: soft. Vascular:           ROS comment: CTA  Impression:  1. Large pseudoaneurysm (2.9x1.5cm) of the distal left femoral artery likely correlating to history of IV drug abuse. 2. There is interval thickening and inflammation along the femoral artery and the proximal superficial femoral artery suggesting vasculitis in association with above. 3. Cellulitis of the left groin with multiple reactive enlarged lymph nodes. . Other Findings:           Anesthesia Plan      general     ASA 3     (Large psuedoaneurysm of distal femoral artery near bifurcation. Anticipate good proximal control. Plan additional PIV following induction. Discussed arterial line if unexpected hemodynamic instability. Blood bank called, antibodies present, verified 2u pRBC to be available at induction. Pain control expected to be challenging, plan multimodal analgesia. )  Induction: intravenous. MIPS: Postoperative opioids intended and Prophylactic antiemetics administered. Anesthetic plan and risks discussed with patient. Use of blood products discussed with patient whom consented to blood products. Plan discussed with CRNA. This pre-anesthesia assessment may be used as a history and physical.    DOS STAFF ADDENDUM:    Pt seen and examined, chart reviewed (including anesthesia, drug and allergy history). No interval changes to history and physical examination. Anesthetic plan, risks, benefits, alternatives, and personnel involved discussed with patient. Patient verbalized an understanding and agrees to proceed.       Libby Sherman MD  November 8, 2022  7:16 AM

## 2022-11-08 NOTE — BRIEF OP NOTE
Brief Postoperative Note      Patient: Rosa Elena Rodriguez  YOB: 1970  MRN: [id number]    Date of Procedure: 11/8/2022    Pre-Op Diagnosis: left femoral pseudoaneurysm    Post-Op Diagnosis: Same       Procedure(s):  LEFT FEMORAL ARTERY REPLACEMENT, SARTORIUS FLAP    Surgeon(s):  Ramya Bauman DO    Assistant:  Surgical Assistant: Shawnee Bello    Anesthesia: General    Estimated Blood Loss (mL): 790     Complications: None    Specimens:   ID Type Source Tests Collected by Time Destination   1 : 1. artery wall Tissue Tissue CULTURE, ANAEROBIC AND AEROBIC Ramya Bauman DO 11/8/2022 9474        Implants:  Implant Name Type Inv. Item Serial No.  Lot No. LRB No. Used Action   [facility] Femoral Artery Common Bone/Graft/Tissue/Human/Synth   [facility] [employer]  [id number] Left 1 Implanted         Drains:   Closed/Suction Drain Left; Anterior Thigh Bulb (Active)       Urinary Catheter 11/08/22 Nguyen;2 Way (Active)           Electronically signed by Ramya Bauman DO on 11/8/2022 at 11:00 AM

## 2022-11-08 NOTE — ANESTHESIA POSTPROCEDURE EVALUATION
Department of Anesthesiology  Postprocedure Note    Patient: Rjaan Wilkes  MRN: [id number]  YOB: 1970  Date of evaluation: 11/8/2022      Procedure Summary     Date: 11/08/22 Room / Location: [address] [place] [address] / [facility]    Anesthesia Start: 5335 Anesthesia Stop: 0422    Procedure: LEFT FEMORAL ARTERY REPLACEMENT WITH CADAVER VEIN AND SARTORIUS MUSCLE  FLAP (Left: Groin) Diagnosis:       Cellulitis, unspecified cellulitis site      (CELLULITIS)    Surgeons: Fran Gee DO Responsible Provider: Arlene Alvarez MD    Anesthesia Type: general ASA Status: 3          Anesthesia Type: General     Kris Phase I: Kris Score: 10    Kris Phase II:        Anesthesia Post Evaluation    Patient location during evaluation: PACU  Patient participation: complete - patient participated  Level of consciousness: awake and sleepy but conscious  Airway patency: patent  Nausea & Vomiting: no nausea and no vomiting  Complications: no  Cardiovascular status: hemodynamically stable and blood pressure returned to baseline  Respiratory status: spontaneous ventilation and nonlabored ventilation  Hydration status: stable  Comments: Uneventful extubation and monitored transport to [facility]. Detailed sign out completed to receiving team. No questions at this time. Ms. Zaida Valdivia was seen resting comfortably following procedure in ICU. Discussed displaced teeth noted intraoperatively. Suspect that teeth were held with resin, but patient denied this. Otherwise resting comfortably. Pain well controlled at this time. Intraoperative course discussed with patient's significant other \"Fish\" who was present with patient preoperatively and in unit. He stated that they had recently started dating, was unsure of her dental history other than Ms. Zaida Valdivia is very self-conscious about her teeth. No other questions at this time.

## 2022-11-08 NOTE — PROGRESS NOTES
Spoke with on call vascular surgeon. Continue to monitor groin site for now.  Electronically signed by Lizandro Rivers RN on 11/7/2022 at 10:00 PM

## 2022-11-08 NOTE — PROGRESS NOTES
Report called to [facility].    Electronically signed by Jurgen Woody RN on 11/8/22 at 9:25 AM EST

## 2022-11-08 NOTE — PROGRESS NOTES
Pt called this RN stating that the swelling and \"hardness\" in her left groin moved more medially. Pt anxious about finding and requesting that the surgeon be made aware. This Rn assessed site. There is an approximate 1 inch oval area of hardness in the left groin with a strong palpable pulse felt.  Call placed to vascular surgery per pt request. Electronically signed by Le Carreno RN on 11/7/2022 at 9:25 PM

## 2022-11-09 LAB
ALBUMIN SERPL-MCNC: 3.1 G/DL (ref 3.4–5)
ANION GAP SERPL CALCULATED.3IONS-SCNC: 14 MMOL/L (ref 3–16)
BASOPHILS ABSOLUTE: 0 K/UL (ref 0–0.2)
BASOPHILS RELATIVE PERCENT: 0.4 %
BLOOD BANK REF CASE: NORMAL
BUN BLDV-MCNC: 21 MG/DL (ref 7–20)
CALCIUM SERPL-MCNC: 9 MG/DL (ref 8.3–10.6)
CHLORIDE BLD-SCNC: 97 MMOL/L (ref 99–110)
CO2: 22 MMOL/L (ref 21–32)
CREAT SERPL-MCNC: 0.9 MG/DL (ref 0.6–1.1)
EOSINOPHILS ABSOLUTE: 0 K/UL (ref 0–0.6)
EOSINOPHILS RELATIVE PERCENT: 0.1 %
GFR SERPL CREATININE-BSD FRML MDRD: >60 ML/MIN/{1.73_M2}
GLUCOSE BLD-MCNC: 111 MG/DL (ref 70–99)
HCT VFR BLD CALC: 25.2 % (ref 36–48)
HEMOGLOBIN: 8.4 G/DL (ref 12–16)
LYMPHOCYTES ABSOLUTE: 1.7 K/UL (ref 1–5.1)
LYMPHOCYTES RELATIVE PERCENT: 20.1 %
MAGNESIUM: 1.9 MG/DL (ref 1.8–2.4)
MCH RBC QN AUTO: 28.4 PG (ref 26–34)
MCHC RBC AUTO-ENTMCNC: 33.2 G/DL (ref 31–36)
MCV RBC AUTO: 85.6 FL (ref 80–100)
MONOCYTES ABSOLUTE: 0.9 K/UL (ref 0–1.3)
MONOCYTES RELATIVE PERCENT: 10.1 %
NEUTROPHILS ABSOLUTE: 6 K/UL (ref 1.7–7.7)
NEUTROPHILS RELATIVE PERCENT: 69.3 %
PDW BLD-RTO: 14 % (ref 12.4–15.4)
PHOSPHORUS: 3.5 MG/DL (ref 2.5–4.9)
PLATELET # BLD: 442 K/UL (ref 135–450)
PMV BLD AUTO: 6.8 FL (ref 5–10.5)
POTASSIUM SERPL-SCNC: 5 MMOL/L (ref 3.5–5.1)
RBC # BLD: 2.95 M/UL (ref 4–5.2)
SODIUM BLD-SCNC: 133 MMOL/L (ref 136–145)
WBC # BLD: 8.6 K/UL (ref 4–11)

## 2022-11-09 PROCEDURE — 2580000003 HC RX 258: Performed by: STUDENT IN AN ORGANIZED HEALTH CARE EDUCATION/TRAINING PROGRAM

## 2022-11-09 PROCEDURE — 6360000002 HC RX W HCPCS: Performed by: STUDENT IN AN ORGANIZED HEALTH CARE EDUCATION/TRAINING PROGRAM

## 2022-11-09 PROCEDURE — 83735 ASSAY OF MAGNESIUM: CPT

## 2022-11-09 PROCEDURE — APPNB30 APP NON BILLABLE TIME 0-30 MINS: Performed by: NURSE PRACTITIONER

## 2022-11-09 PROCEDURE — 80069 RENAL FUNCTION PANEL: CPT

## 2022-11-09 PROCEDURE — 6370000000 HC RX 637 (ALT 250 FOR IP): Performed by: STUDENT IN AN ORGANIZED HEALTH CARE EDUCATION/TRAINING PROGRAM

## 2022-11-09 PROCEDURE — 99024 POSTOP FOLLOW-UP VISIT: CPT | Performed by: SURGERY

## 2022-11-09 PROCEDURE — 94760 N-INVAS EAR/PLS OXIMETRY 1: CPT

## 2022-11-09 PROCEDURE — 85025 COMPLETE CBC W/AUTO DIFF WBC: CPT

## 2022-11-09 PROCEDURE — 2100000000 HC CCU R&B

## 2022-11-09 PROCEDURE — 99232 SBSQ HOSP IP/OBS MODERATE 35: CPT | Performed by: INTERNAL MEDICINE

## 2022-11-09 RX ADMIN — OXYCODONE HYDROCHLORIDE 10 MG: 10 TABLET ORAL at 08:49

## 2022-11-09 RX ADMIN — CEFEPIME 2000 MG: 2 INJECTION, POWDER, FOR SOLUTION INTRAVENOUS at 03:16

## 2022-11-09 RX ADMIN — KETOROLAC TROMETHAMINE 15 MG: 15 INJECTION, SOLUTION INTRAMUSCULAR; INTRAVENOUS at 18:05

## 2022-11-09 RX ADMIN — SODIUM CHLORIDE, PRESERVATIVE FREE 10 ML: 5 INJECTION INTRAVENOUS at 08:54

## 2022-11-09 RX ADMIN — KETOROLAC TROMETHAMINE 15 MG: 15 INJECTION, SOLUTION INTRAMUSCULAR; INTRAVENOUS at 12:01

## 2022-11-09 RX ADMIN — OXYCODONE HYDROCHLORIDE 10 MG: 10 TABLET ORAL at 18:06

## 2022-11-09 RX ADMIN — VANCOMYCIN HYDROCHLORIDE 750 MG: 750 INJECTION, POWDER, LYOPHILIZED, FOR SOLUTION INTRAVENOUS at 08:52

## 2022-11-09 RX ADMIN — VANCOMYCIN HYDROCHLORIDE 750 MG: 750 INJECTION, POWDER, LYOPHILIZED, FOR SOLUTION INTRAVENOUS at 20:38

## 2022-11-09 RX ADMIN — KETOROLAC TROMETHAMINE 15 MG: 15 INJECTION, SOLUTION INTRAMUSCULAR; INTRAVENOUS at 06:01

## 2022-11-09 RX ADMIN — KETOROLAC TROMETHAMINE 15 MG: 15 INJECTION, SOLUTION INTRAMUSCULAR; INTRAVENOUS at 00:04

## 2022-11-09 RX ADMIN — SODIUM CHLORIDE, PRESERVATIVE FREE 10 ML: 5 INJECTION INTRAVENOUS at 08:55

## 2022-11-09 RX ADMIN — OXYCODONE HYDROCHLORIDE 10 MG: 10 TABLET ORAL at 22:55

## 2022-11-09 RX ADMIN — OXYCODONE HYDROCHLORIDE 10 MG: 10 TABLET ORAL at 13:59

## 2022-11-09 RX ADMIN — SODIUM CHLORIDE: 9 INJECTION, SOLUTION INTRAVENOUS at 07:54

## 2022-11-09 RX ADMIN — CEFEPIME 2000 MG: 2 INJECTION, POWDER, FOR SOLUTION INTRAVENOUS at 15:27

## 2022-11-09 RX ADMIN — ENOXAPARIN SODIUM 30 MG: 100 INJECTION SUBCUTANEOUS at 08:49

## 2022-11-09 RX ADMIN — ASPIRIN 81 MG 81 MG: 81 TABLET ORAL at 08:49

## 2022-11-09 ASSESSMENT — PAIN SCALES - GENERAL
PAINLEVEL_OUTOF10: 0
PAINLEVEL_OUTOF10: 7
PAINLEVEL_OUTOF10: 0
PAINLEVEL_OUTOF10: 0
PAINLEVEL_OUTOF10: 7
PAINLEVEL_OUTOF10: 0
PAINLEVEL_OUTOF10: 0

## 2022-11-09 ASSESSMENT — PAIN DESCRIPTION - LOCATION
LOCATION: GROIN
LOCATION: BACK;LEG
LOCATION: GROIN
LOCATION: GROIN

## 2022-11-09 ASSESSMENT — PAIN DESCRIPTION - DESCRIPTORS
DESCRIPTORS: ACHING

## 2022-11-09 ASSESSMENT — PAIN DESCRIPTION - ORIENTATION
ORIENTATION: LEFT

## 2022-11-09 ASSESSMENT — PAIN DESCRIPTION - PAIN TYPE: TYPE: SURGICAL PAIN

## 2022-11-09 NOTE — PROGRESS NOTES
Infectious Disease Follow up Notes  Admit Date: 11/4/2022  Hospital Day: 6    Antibiotics :   IV Vancomycin   IV Cefepime     CHIEF COMPLAINT:     Left femoral artery Pseudoaneurysm  Left groin cellulitis   IVDA    Subjective interval History :  47 y.o. woman with a past history significant for hep C, IV drug abuse, history of DVT in the past, not on anticoagulation currently admitted to the hospital secondary to left groin pain and localized swelling with associated lymphadenopathy. Patient has been injecting drugs for a long time she been using all the veins she admits to injecting into the left groin she missed her vein and hit her artery in the past.  She was using opiates in the past and now started using cocaine and Amphetamines her last use was before coming in to  hospital.  Patient has been using profanity during consultation and very irritable. She was already evaluated by vascular surgery. Blood cultures are obtained are in process. CTA of the left lower extremity in the ED indicated left distal femoral artery pseudoaneurysm with underlying local changes also associated adenopathy likely reactive lymph nodes in the groin. She was seen in the ED on 10/1/22 for the pain in the groin but she eloped from the ED before being evaluated. Labs indicate creatinine 1.1, UDS positive for cocaine amphetamine, WBC 10.5 hemoglobin 11.1 platelets 490.   Location :   Left groin swelling pain localized adenopathy  Quality : Aching      Severity : 10/10    Duration :  1 week     Timing : constant   Context :  IVDA   Modifying factors :None   Associated signs and symptoms: no FEVERS, no chills local groin pain and swelling+           Interval History : seen in CVICU s/p Left femoral artery Pseudoaneurysm repair with Homograft and sartorius flap tolerating iv ABX ok and JOLEEN drain in place with KALYN on  - remains on Bed rest post surgery and PICC working ok -     Past Medical History:    Past Medical History:   Diagnosis Date    Hepatitis C without hepatic coma     Hx of blood clots     DEEP VEIN THROMBOSIS FROM IV DRUG USE    IV drug abuse ([facility] [address]     None for 5 mos    Methadone use        Past Surgical History:    Past Surgical History:   Procedure Laterality Date    BREAST BIOPSY Right 2015    Excisional     SECTION      FEMORAL BYPASS Left 2022    LEFT FEMORAL ARTERY REPLACEMENT WITH CADAVER VEIN AND SARTORIUS MUSCLE  FLAP performed by Rachel Adams DO at [address]         Current Medications:    Outpatient Medications Marked as Taking for the [facility] encounter [facility] Encounter)   Medication Sig Dispense Refill    ibuprofen (ADVIL;MOTRIN) 200 MG tablet Take 800 mg by mouth in the morning, at noon, in the evening, and at bedtime      acetaminophen (TYLENOL) 500 MG tablet Take 1,000-2,000 mg by mouth in the morning, at noon, in the evening, and at bedtime         Allergies:  Pcn [penicillins]    Immunizations :   Immunization History   Administered Date(s) Administered    COVID-19, PFIZER PURPLE top, DILUTE for use, (age 15 y+), 30mcg/0.3mL 2021    Tdap (Boostrix, Adacel) 2020       Social History:      Social History     Tobacco Use    Smoking status: Every Day     Packs/day: 1.00     Years: 20.00     Pack years: 20.00     Types: Cigarettes    Smokeless tobacco: Never   Substance Use Topics    Alcohol use: Yes    Drug use: Yes     Frequency: 1.0 times per week     Types: IV, Cocaine     Social History     Tobacco Use   Smoking Status Every Day    Packs/day: 1.00    Years: 20.00    Pack years: 20.00    Types: Cigarettes   Smokeless Tobacco Never      Family History  : no DVT no COPD        REVIEW OF SYSTEMS:      Constitutional:  negative for fevers, chills, night sweats  Eyes:  negative for blurred vision, eye discharge, visual disturbance   HEENT:  negative for hearing loss, ear drainage,nasal congestion  Respiratory:  negative for cough, shortness of breath or hemoptysis   Cardiovascular:  negative for chest pain, palpitations, syncope  Gastrointestinal:  negative for nausea, vomiting, diarrhea, constipation, abdominal pain  Genitourinary:  negative for frequency, dysuria, urinary incontinence, hematuria  Hematologic/Lymphatic:  negative for easy bruising, bleeding and lymphadenopathy  Allergic/Immunologic:  negative for recurrent infections, angioedema, anaphylaxis   Endocrine:  negative for weight changes, polyuria, polydipsia and polyphagia  Musculoskeletal:  lEFT GROIN  pain + , swelling+ , decreased range of motion  Integumentary: No rashes, skin lesions  Neurological:  negative for headaches, slurred speech, unilateral weakness  Psychiatric: negative for hallucinations,confusion,agitation.                 PHYSICAL EXAM:      Vitals:    /86   Pulse 97   Temp 97.9 °F (36.6 °C) (Axillary)   Resp 17   Ht 5' 2\" (1.575 m)   Wt 109 lb 9.1 oz (49.7 kg)   SpO2 100%   BMI 20.04 kg/m²     General Appearance: alert,in some  acute distress, no pallor, no icterus  needle marks+ un cooperative   Skin: warm and dry, no rash or erythema  Head: normocephalic and atraumatic  Eyes: pupils equal, round, and reactive to light, conjunctivae normal  ENT: tympanic membrane, external ear and ear canal normal bilaterally, nose without deformity, nasal mucosa and turbinates normal without polyps  Neck: supple and non-tender without mass, no thyromegaly  no cervical lymphadenopathy  Pulmonary/Chest: clear to auscultation bilaterally- no wheezes, rales or rhonchi, normal air movement, no respiratory distress  Cardiovascular: normal rate, regular rhythm, normal S1 and S2, no murmurs, rubs, clicks, or gallops, no carotid bruits  Abdomen: soft, non-tender, non-distended, normal bowel sounds, no masses or organomegaly  Extremities: no cyanosis, clubbing or edema  Musculoskeletal: normal range of motion, no joint swelling, deformity or tenderness  Integumentary: No rashes, no abnormal skin lesions, no petechiae  Neurologic: reflexes normal and symmetric, no cranial nerve deficit  Psych:  Orientation, sensorium, mood irritable           Lines: IV  Left groin post op dressing  + and drain in place++ good pedal pulses +      Data Review:    CBC:   Lab Results   Component Value Date    WBC 8.6 11/09/2022    HGB 8.4 (L) 11/09/2022    HCT 25.2 (L) 11/09/2022    MCV 85.6 11/09/2022     11/09/2022     RENAL:   Lab Results   Component Value Date    CREATININE 0.9 11/09/2022    BUN 21 (H) 11/09/2022     (L) 11/09/2022    K 5.0 11/09/2022    CL 97 (L) 11/09/2022    CO2 22 11/09/2022     SED RATE:   Lab Results   Component Value Date/Time    SEDRATE 57 11/05/2022 04:03 AM     CK: No results found for: CKTOTAL  CRP:   Lab Results   Component Value Date/Time    CRP 8.8 11/05/2022 04:03 AM     Hepatic Function Panel:   Lab Results   Component Value Date/Time    ALKPHOS 113 11/04/2022 01:16 AM    ALT 13 11/04/2022 01:16 AM    AST 21 11/04/2022 01:16 AM    PROT 9.0 11/04/2022 01:16 AM    BILITOT <0.2 11/04/2022 01:16 AM    BILIDIR 0.3 11/16/2019 12:58 AM    IBILI 0.5 11/16/2019 12:58 AM    LABALBU 3.1 11/09/2022 04:25 AM     UA:  Lab Results   Component Value Date/Time    COLORU Yellow 01/01/2019 09:39 PM    CLARITYU Clear 01/01/2019 09:39 PM    GLUCOSEU Negative 01/01/2019 09:39 PM    BILIRUBINUR Negative 01/01/2019 09:39 PM    KETUA 15 01/01/2019 09:39 PM    SPECGRAV >=1.030 01/01/2019 09:39 PM    BLOODU Negative 01/01/2019 09:39 PM    PHUR 6.5 11/05/2022 12:09 PM    PROTEINU Negative 01/01/2019 09:39 PM    UROBILINOGEN 0.2 01/01/2019 09:39 PM    NITRU Negative 01/01/2019 09:39 PM    LEUKOCYTESUR Negative 01/01/2019 09:39 PM    LABMICR Not Indicated 01/01/2019 09:39 PM    URINETYPE Not Specified 01/01/2019 09:39 PM      Urine Microscopic: No results found for: LABCAST, BACTERIA, COMU, HYALCAST, WBCUA, RBCUA, EPIU  Urine Reflex to Culture:   Lab Results   Component Value Date/Time    URRFLXCULT Not Indicated 01/01/2019 09:39 PM         Ref Range & Units 11/04/22 1020   Amphetamine Screen, Urine Negative <1000ng/mL POSITIVE Abnormal     Comment: High concentrations of ephedrine/pseudoephedrine or   phenylpropanolamine may cause false positive results   for amphetamine. Therefore, confirmatory testing for   amphetamine should be considered if clinically indicated. Barbiturate Screen, Ur Negative <200 ng/mL Neg    Benzodiazepine Screen, Urine Negative <200 ng/mL Neg    Cannabinoid Scrn, Ur Negative <50 ng/mL Neg    Cocaine Metabolite Screen, Urine Negative <300 ng/mL POSITIVE Abnormal       MICRO: cultures reviewed and updated by me   Blood Culture:   Lab Results   Component Value Date/Time    BC No Growth after 4 days of incubation. 11/04/2022 02:50 AM       Respiratory Culture:  Lab Results   Component Value Date/Time    LABGRAM 1+ WBC's (Polymorphonuclear)  No organisms seen   11/08/2022 09:34 AM     AFB:No results found for: AFBSMEAR  Viral Culture:  No results found for: COVID19  Urine Culture: No results for input(s): Ivin Furrow in the last 72 hours. IMAGING:    CTA LOWER EXTREMITY LEFT W CONTRAST   Final Result   1. Large pseudoaneurysm of the distal left femoral artery likely correlating   to history of IV drug abuse. 2. There is interval thickening and inflammation along the femoral artery and   the proximal superficial femoral artery suggesting vasculitis in association   with above. 3. Cellulitis of the left groin with multiple reactive enlarged lymph nodes.            Component Ref Range & Units 11/05/22 1255   HIV Ag/Ab Non-reactive Non-Reactive    HIV-1 Antibody Non-reactive Non-Reactive    HIV ANTIGEN Non-reactive Non-Reactive    HIV-2 Ab Non-reactive Non-Reactive    Resulting Agency  [facility]        Specimen Collected: 11/05/22 1255 Last Resulted: 11/05/22 1446 View Other Order Details          All the pertinent images and reports for the current Hospitalization were reviewed by me     Scheduled Meds:   sodium chloride flush  5-40 mL IntraVENous 2 times per day    enoxaparin  30 mg SubCUTAneous Daily    ketorolac  15 mg IntraVENous Q6H    aspirin  81 mg Oral Daily    vancomycin  750 mg IntraVENous Q12H    sodium chloride flush  5-40 mL IntraVENous 2 times per day    cefepime  2,000 mg IntraVENous Q12H    vancomycin (VANCOCIN) intermittent dosing (placeholder)   Other RX Placeholder    nicotine  1 patch TransDERmal Daily    lidocaine 1 % injection  5 mL IntraDERmal Once       Continuous Infusions:   sodium chloride      sodium chloride Stopped (11/09/22 0316)       PRN Meds:  sodium chloride flush, sodium chloride, calcium carbonate, sodium chloride flush, sodium chloride, polyethylene glycol, acetaminophen **OR** acetaminophen, ondansetron, oxyCODONE **OR** oxyCODONE      Assessment:     Patient Active Problem List   Diagnosis    Breast mass, right    Cellulitis    Pseudoaneurysm (HCC)    Femoral artery pseudo-aneurysm, left (HCC)    IV drug abuse (HCC)    Inguinal adenopathy    Hep C w/o coma, chronic (HCC)    Elevated erythrocyte sedimentation rate    CRP elevated     Left Distal Femoral artery large Pseudo aneurysm+  IVDA on going  H/o Direct Needle injection into Left groin  Cocaine abuse  Amphetamine abuse  Hep C+Ve  Left inguinal adenopathy  BMI at  21   CTA very abnormal involving the Left distal femoral artery Pseudo aneurysm         Unfortunately major complication from direct needle injury to Leti femoral artery and IVDA has been using Cocaine and Amphetamine - seen by vascular team concerned about rupture and bleed - will need IV abx given the presentation concern for local infection she has no fever or reactive WBC so no direct bacterial invasion yet - possible local reaction risk for seeding and systemic spread       Blood cx -ve and HIV screen -ve    S/p Left Femoral artery Pseudoaneurysm surgery and drain in place OR cx in process thus far negative -     Labs, Microbiology, Radiology and all the pertinent results from current hospitalization and  care every where were reviewed  by me as a part of the evaluation   Plan:   Cont IV Vancomycin  x 750 mg q 12 HRS  IV Cefepime x 2 gm q 12 HR  Blood cx NGTD  ESR 57 , CRP 8.8   HIV screen  -ve  Vascular surgery following   Watch for Withdrawal   S/p  surgery notes reviewed  and OR tissue cx in process  Depending on her progress will see if we can choose oral abx when ready   as long as the cx remain negative          Discussed with patient/Family and Nursing   Risk of Complications/Morbidity: High      Illness(es)/ Infection present that pose threat to bodily function. There is potential for severe exacerbation of infection/side effects of treatment. Therapy requires intensive monitoring for antimicrobial agent toxicity. Discussed with patient/Family and Nursing staff     Thanks for allowing me to participate in your patient's care and please call me with any questions or concerns.     Bryce Gomez MD  Infectious Disease  [facility] ([facility]) Physician  Phone: [phone]   Fax : [phone]

## 2022-11-09 NOTE — PROGRESS NOTES
Physician Progress Note      PATIENT:               Lauro Oates  CSN #:                  [id number]  :                       1970  ADMIT DATE:       2022 12:16 AM  DISCH DATE:  RESPONDING  PROVIDER #:        Kat Vaughan          QUERY TEXT:    Pt admitted with Left groin large pseudoaneurysm due to IV drug use status   post repair by vascular team on 2022. Pt noted to have decreasing H&H. If   possible, please document in the progress notes and discharge summary if you   are evaluating and/or treating any of the following: The medical record reflects the following:  Risk Factors: Current admission for Large pseudoaneurysm of the distal left   femoral artery requiring surgical intervention. Clinical Indicators: H&H 22- 11.8/35.1   H&H 22- .4/25.2. .. 22-Op report 22 -  Left common femoral artery replacement using a   arterial homograft graft. Sartorius muscle flap. EBL 300ml  Treatment: Monitor    Thank Delma Schroeder RN BSN CDS [email or web address]  [email or web address]  Options provided:  -- Postoperative acute blood loss anemia  -- Acute blood loss anemia  -- Chronic blood loss anemia  -- Acute on chronic blood loss anemia  -- Iron deficiency anemia  -- Anemia of chronic disease due to, Please specify reason. -- Dilutional anemia  -- Precipitous drop in Hemoglobin and Hematocrit  -- Other - I will add my own diagnosis  -- Disagree - Not applicable / Not valid  -- Disagree - Clinically unable to determine / Unknown  -- Refer to Clinical Documentation Reviewer    PROVIDER RESPONSE TEXT:    This patient has acute blood loss anemia.     Query created by: Jona Triplett on 2022 10:07 AM      Electronically signed by:  Kat Vaughan 2022 10:25 AM

## 2022-11-09 NOTE — CARE COORDINATION
Chart Reviewed. MEt with patient to discuss dc planning needs, address IV drug use and assess DC needs. She reports she has an 25year old son with whom she and son are currently living with a friend who is in drug treatment. They are staying at her home. She states she is not sure she is able to return there if she would need a lot of care. Initiated discussion regarding Drug Use. She reports she used to shoot heroin but has not done this since . She had returned to shooting up but it was not heroin. She does not think she needs Drug Rehab, states, \"I basically wrote the book. \"    She reports she has been in and out of them multiple times as well as AA programs. She has had some recent stressors in life. Her two sisters and a friend are now  and she does not think she has dealt with them well. She is interested in mental health counseling. Provided her with both [facility] as well as mental health providers for her reference. She reports she is not supposed to ambulate yet. Informed her that our PT/OT will need to complete an evaluation fo her needs. Additionally, if she would need IV therapy, she may need a SNF. Provided her with CMS star rated list of SNF agencies for her review. Provided her with CMS star rating system. She will review in the event she will need SNF. She would need a full precert. Following for DC needs.   Prem Mcmanus, [place]     Case Management   [phone]    2022  3:55 PM

## 2022-11-09 NOTE — PROGRESS NOTES
Hospitalist Progress Note      PCP: Anny Roman MD    Date of Admission: 11/4/2022      Hospital Course: Patient with history of IV drug use admitted with left groin pain and swelling, found to have a large pseudoaneurysm due to IV drug use, underwent left common femoral artery replacement using a cadaveric vein graft with sartorius muscle flap on 11/8/2022. Subjective: Patient seen and examined. Remains on strict bedrest.  Denies any new complaints.       Medications:  Reviewed    Infusion Medications    sodium chloride      sodium chloride Stopped (11/09/22 0316)     Scheduled Medications    sodium chloride flush  5-40 mL IntraVENous 2 times per day    enoxaparin  30 mg SubCUTAneous Daily    ketorolac  15 mg IntraVENous Q6H    aspirin  81 mg Oral Daily    vancomycin  750 mg IntraVENous Q12H    sodium chloride flush  5-40 mL IntraVENous 2 times per day    cefepime  2,000 mg IntraVENous Q12H    vancomycin (VANCOCIN) intermittent dosing (placeholder)   Other RX Placeholder    nicotine  1 patch TransDERmal Daily    lidocaine 1 % injection  5 mL IntraDERmal Once     PRN Meds: sodium chloride flush, sodium chloride, calcium carbonate, sodium chloride flush, sodium chloride, polyethylene glycol, acetaminophen **OR** acetaminophen, ondansetron, oxyCODONE **OR** oxyCODONE      Intake/Output Summary (Last 24 hours) at 11/9/2022 1551  Last data filed at 11/9/2022 1500  Gross per 24 hour   Intake 3258.43 ml   Output 2515 ml   Net 743.43 ml         Physical Exam Performed:    /75   Pulse (!) 102   Temp 97.9 °F (36.6 °C) (Axillary)   Resp 25   Ht 5' 2\" (1.575 m)   Wt 109 lb 9.1 oz (49.7 kg)   SpO2 100%   BMI 20.04 kg/m²     On exam  Alert and oriented x3  Anicteric  Left groin wound dressing looks clean and dry, drainage tubes in place with serosanguineous fluid  Lungs clear to auscultation bilaterally  CVS: S1 and S2, regular rate and rhythm  Abdomen soft and nontender  No edema         Labs: Recent Labs     11/07/22  0550 11/08/22  0618 11/09/22  0425   WBC 6.2 8.5 8.6   HGB 11.2* 11.8* 8.4*   HCT 32.3* 35.1* 25.2*   * 512* 442       Recent Labs     11/07/22  0550 11/08/22  0618 11/09/22  0425   * 130* 133*   K 4.5 5.1 5.0   CL 99 95* 97*   CO2 25 28 22   BUN 7 11 21*   CREATININE 0.7 0.8 0.9   CALCIUM 9.5 9.8 9.0   PHOS 3.6 3.5 3.5       No results for input(s): AST, ALT, BILIDIR, BILITOT, ALKPHOS in the last 72 hours. No results for input(s): INR in the last 72 hours. No results for input(s): [place] Mila in the last 72 hours. Urinalysis:      Lab Results   Component Value Date/Time    NITRU Negative 01/01/2019 09:39 PM    BLOODU Negative 01/01/2019 09:39 PM    SPECGRAV >=1.030 01/01/2019 09:39 PM    GLUCOSEU Negative 01/01/2019 09:39 PM       Radiology:  CTA LOWER EXTREMITY LEFT W CONTRAST   Final Result   1. Large pseudoaneurysm of the distal left femoral artery likely correlating   to history of IV drug abuse. 2. There is interval thickening and inflammation along the femoral artery and   the proximal superficial femoral artery suggesting vasculitis in association   with above. 3. Cellulitis of the left groin with multiple reactive enlarged lymph nodes.                  Assessment/Plan:    Active Hospital Problems    Diagnosis     IV drug abuse ([facility] [address] [F19.10]      Priority: Medium    Inguinal adenopathy [R59.0]      Priority: Medium    Hep C w/o coma, chronic (HCC) [B18.2]      Priority: Medium    Elevated erythrocyte sedimentation rate [R70.0]      Priority: Medium    CRP elevated [R79.82]      Priority: Medium    Cellulitis [L03.90]      Priority: Medium    Pseudoaneurysm (HCC) [I72.9]      Priority: Medium    Femoral artery pseudo-aneurysm, left (HCC) [I72.4]      Priority: Medium     Left groin large pseudoaneurysm due to IV drug use status post repair by vascular team on 11/8/2022  Continue bedrest for 72 hours postoperatively  Monitor output from wound drains  IV antibiotics per ID recommendations  Follow-up on Intra-Op cultures  Hyponatremia: Improving with IV fluid hydration  Monitor BMP  Monitor I's and O's      DVT Prophylaxis: Lovenox  Diet: ADULT DIET; Regular  Code Status: Full Code  PT/OT     Dispo -possible discharge home in 2 to 3 days pending progress.   And final recommendations from consult teams           Delma Crum MD

## 2022-11-09 NOTE — PROGRESS NOTES
[facility]  Progress Note    11/9/2022 8:57 AM    Chief Complaint: Left Groin Pain    Reason for Consult: Left Groin Abscess    POD #1 Left Common Femoral Artery Replacement with Cadaver Graft and Sartorius Muscle Flap with Dr. Becky Muñiz    Subjective: Pt seen resting in bed in CVU, pain control is okay, no significant complaints, issues with KALYN seal throughout night    Vital Signs:  Vitals:    11/09/22 0600 11/09/22 0612 11/09/22 0700 11/09/22 0800   BP: (!) 157/97  (!) 158/106 (!) 178/104   Pulse: 88  83 93   Resp: 19  19 15   Temp:    97.7 °F (36.5 °C)   TempSrc:    Axillary   SpO2: 97%  98% 100%   Weight:  109 lb 9.1 oz (49.7 kg)     Height:           I/O:    Intake/Output Summary (Last 24 hours) at 11/9/2022 0857  Last data filed at 11/9/2022 0800  Gross per 24 hour   Intake 1631.34 ml   Output 1935 ml   Net -303.66 ml         Physical Exam:   General: no apparent distress, alert and oriented, afebrile  Chest/Lungs: non labored breathing no tachypnea, retractions or cyanosis  Extremities: normal ROM, KALYN dressing remains in place, flashing red d/t incomplete seal - reinforced but unable to achieve adequate seal, JOLEEN drain in place, Left Leg warm and able to move leg and foot without difficulty   Vascular: extremities warm and well perfused, no signs of cyanosis or ischemia    Pulses:  -L DP: +2/4 palpable    Labs:   Lab Results   Component Value Date/Time     11/09/2022 04:25 AM    K 5.0 11/09/2022 04:25 AM    K 3.7 11/16/2019 12:58 AM    CL 97 11/09/2022 04:25 AM    CO2 22 11/09/2022 04:25 AM    BUN 21 11/09/2022 04:25 AM    CREATININE 0.9 11/09/2022 04:25 AM    GFRAA >60 11/16/2019 12:58 AM    LABGLOM >60 11/09/2022 04:25 AM    GLUCOSE 111 11/09/2022 04:25 AM    PHOS 3.5 11/09/2022 04:25 AM    MG 1.90 11/09/2022 04:25 AM    CALCIUM 9.0 11/09/2022 04:25 AM     Lab Results   Component Value Date/Time    WBC 8.6 11/09/2022 04:25 AM    RBC 2.95 11/09/2022 04:25 AM    HGB 8.4 11/09/2022 04:25 AM    HCT 25.2 11/09/2022 04:25 AM    MCV 85.6 11/09/2022 04:25 AM    RDW 14.0 11/09/2022 04:25 AM     11/09/2022 04:25 AM   No results found for: INR, PROTIME     Scheduled Meds:    sodium chloride flush  5-40 mL IntraVENous 2 times per day    enoxaparin  30 mg SubCUTAneous Daily    ketorolac  15 mg IntraVENous Q6H    aspirin  81 mg Oral Daily    vancomycin  750 mg IntraVENous Q12H    sodium chloride flush  5-40 mL IntraVENous 2 times per day    cefepime  2,000 mg IntraVENous Q12H    vancomycin (VANCOCIN) intermittent dosing (placeholder)   Other RX Placeholder    nicotine  1 patch TransDERmal Daily    lidocaine 1 % injection  5 mL IntraDERmal Once     Continuous Infusions:    sodium chloride      sodium chloride 75 mL/hr at 11/09/22 0754    sodium chloride Stopped (11/09/22 0316)       Imaging:   CTA LLE - 11/4/2022  Impression  1. Large pseudoaneurysm of the distal left femoral artery likely correlating to history of IV drug abuse. 2. There is interval thickening and inflammation along the femoral artery and the proximal superficial femoral artery suggesting vasculitis in association with above. 3. Cellulitis of the left groin with multiple reactive enlarged lymph nodes.     Assessment:   -Left Distal Femoral Artery Pseudoaneurysm  -POD #1 Left Common Femoral Artery Replacement with Cadaver Graft and Sartorius Muscle Flap with Dr. Steff Mirza  -Pain control is okay  -KALYN dressing remains in place - flashing red d/t inadequate seal - dressing reinforced but unable to achieve a seal  -JOLEEN drain with 70 mL out since surgery  -Left Foot warm, DP palpable    Plan:  -Continue strict bedrest until 48-72 hours post-op  -Keep KALYN dressing in place for now despite inadequate seal - still acting as a sterile dressing  -Keep JOLEEN drain in place for now and continue to monitor output  -D/C IVF  -IV Antibiotics - per ID    All pertinent information and plan of care discussed with Dr. Moreira Poster Jerri. All questions and concerns were addressed with the patient. I have discussed the above stated plan with the patient and the nurse. The patient verbalized understanding and agreed with the plan. Thank you for allowing to us to participate in the care of Mikhail Benavides      Electronically signed by SANYA Vazquez - CNP on 11/9/2022 at 8:57 AM   Pt seen and examined. for DIAGNOSIS OF infected pseudoaneurysm left common femoral agree with SANYA Vazquez's note. Labs reviewed. Vitals   Vitals:    11/09/22 0612 11/09/22 0700 11/09/22 0800 11/09/22 0900   BP:  (!) 158/106 (!) 178/104 134/87   Pulse:  83 93 89   Resp:  19 15 18   Temp:   97.7 °F (36.5 °C)    TempSrc:   Axillary    SpO2:  98% 100% 100%   Weight: 109 lb 9.1 oz (49.7 kg)      Height:       Patient examined the palpable dorsalis pedis pulse groin wound kathrine in place with no drainage some leak detected on the kathrine added more Tegaderms but no obvious source for the leak, possibly its a Kalin-Amador which is drained about 30 through the night we may change the entire thing tomorrow.   Patient knows she needs to be on bedrest for another 24 hours to help with the sartorius flap stay in place

## 2022-11-10 LAB
ALBUMIN SERPL-MCNC: 3.3 G/DL (ref 3.4–5)
ANION GAP SERPL CALCULATED.3IONS-SCNC: 7 MMOL/L (ref 3–16)
BASOPHILS ABSOLUTE: 0.1 K/UL (ref 0–0.2)
BASOPHILS RELATIVE PERCENT: 0.6 %
BUN BLDV-MCNC: 15 MG/DL (ref 7–20)
CALCIUM SERPL-MCNC: 8.8 MG/DL (ref 8.3–10.6)
CHLORIDE BLD-SCNC: 96 MMOL/L (ref 99–110)
CO2: 25 MMOL/L (ref 21–32)
CREAT SERPL-MCNC: 0.7 MG/DL (ref 0.6–1.1)
EOSINOPHILS ABSOLUTE: 0.2 K/UL (ref 0–0.6)
EOSINOPHILS RELATIVE PERCENT: 1.8 %
GFR SERPL CREATININE-BSD FRML MDRD: >60 ML/MIN/{1.73_M2}
GLUCOSE BLD-MCNC: 102 MG/DL (ref 70–99)
HCT VFR BLD CALC: 24.3 % (ref 36–48)
HEMOGLOBIN: 8.2 G/DL (ref 12–16)
LYMPHOCYTES ABSOLUTE: 1.8 K/UL (ref 1–5.1)
LYMPHOCYTES RELATIVE PERCENT: 18 %
MAGNESIUM: 1.7 MG/DL (ref 1.8–2.4)
MCH RBC QN AUTO: 28.9 PG (ref 26–34)
MCHC RBC AUTO-ENTMCNC: 33.6 G/DL (ref 31–36)
MCV RBC AUTO: 86 FL (ref 80–100)
MONOCYTES ABSOLUTE: 0.7 K/UL (ref 0–1.3)
MONOCYTES RELATIVE PERCENT: 6.9 %
NEUTROPHILS ABSOLUTE: 7.1 K/UL (ref 1.7–7.7)
NEUTROPHILS RELATIVE PERCENT: 72.7 %
PDW BLD-RTO: 13.9 % (ref 12.4–15.4)
PHOSPHORUS: 2.7 MG/DL (ref 2.5–4.9)
PLATELET # BLD: 391 K/UL (ref 135–450)
PMV BLD AUTO: 7.1 FL (ref 5–10.5)
POTASSIUM SERPL-SCNC: 5 MMOL/L (ref 3.5–5.1)
RBC # BLD: 2.83 M/UL (ref 4–5.2)
SODIUM BLD-SCNC: 128 MMOL/L (ref 136–145)
WBC # BLD: 9.7 K/UL (ref 4–11)

## 2022-11-10 PROCEDURE — 99232 SBSQ HOSP IP/OBS MODERATE 35: CPT | Performed by: INTERNAL MEDICINE

## 2022-11-10 PROCEDURE — 6370000000 HC RX 637 (ALT 250 FOR IP): Performed by: STUDENT IN AN ORGANIZED HEALTH CARE EDUCATION/TRAINING PROGRAM

## 2022-11-10 PROCEDURE — APPNB30 APP NON BILLABLE TIME 0-30 MINS: Performed by: NURSE PRACTITIONER

## 2022-11-10 PROCEDURE — 99024 POSTOP FOLLOW-UP VISIT: CPT | Performed by: SURGERY

## 2022-11-10 PROCEDURE — 2580000003 HC RX 258: Performed by: STUDENT IN AN ORGANIZED HEALTH CARE EDUCATION/TRAINING PROGRAM

## 2022-11-10 PROCEDURE — 83735 ASSAY OF MAGNESIUM: CPT

## 2022-11-10 PROCEDURE — 6360000002 HC RX W HCPCS: Performed by: STUDENT IN AN ORGANIZED HEALTH CARE EDUCATION/TRAINING PROGRAM

## 2022-11-10 PROCEDURE — 2100000000 HC CCU R&B

## 2022-11-10 PROCEDURE — 94760 N-INVAS EAR/PLS OXIMETRY 1: CPT

## 2022-11-10 PROCEDURE — 80069 RENAL FUNCTION PANEL: CPT

## 2022-11-10 PROCEDURE — 85025 COMPLETE CBC W/AUTO DIFF WBC: CPT

## 2022-11-10 RX ORDER — BISACODYL 10 MG
10 SUPPOSITORY, RECTAL RECTAL DAILY PRN
Status: DISCONTINUED | OUTPATIENT
Start: 2022-11-10 | End: 2022-11-14 | Stop reason: HOSPADM

## 2022-11-10 RX ORDER — POLYETHYLENE GLYCOL 3350 17 G/17G
17 POWDER, FOR SOLUTION ORAL DAILY
Status: DISCONTINUED | OUTPATIENT
Start: 2022-11-11 | End: 2022-11-14 | Stop reason: HOSPADM

## 2022-11-10 RX ADMIN — ENOXAPARIN SODIUM 30 MG: 100 INJECTION SUBCUTANEOUS at 09:26

## 2022-11-10 RX ADMIN — KETOROLAC TROMETHAMINE 15 MG: 15 INJECTION, SOLUTION INTRAMUSCULAR; INTRAVENOUS at 06:17

## 2022-11-10 RX ADMIN — CEFEPIME 2000 MG: 2 INJECTION, POWDER, FOR SOLUTION INTRAVENOUS at 15:14

## 2022-11-10 RX ADMIN — OXYCODONE HYDROCHLORIDE 10 MG: 10 TABLET ORAL at 15:12

## 2022-11-10 RX ADMIN — OXYCODONE HYDROCHLORIDE 10 MG: 10 TABLET ORAL at 22:04

## 2022-11-10 RX ADMIN — KETOROLAC TROMETHAMINE 15 MG: 15 INJECTION, SOLUTION INTRAMUSCULAR; INTRAVENOUS at 12:07

## 2022-11-10 RX ADMIN — OXYCODONE HYDROCHLORIDE 10 MG: 10 TABLET ORAL at 06:16

## 2022-11-10 RX ADMIN — CEFEPIME 2000 MG: 2 INJECTION, POWDER, FOR SOLUTION INTRAVENOUS at 02:54

## 2022-11-10 RX ADMIN — ASPIRIN 81 MG 81 MG: 81 TABLET ORAL at 09:26

## 2022-11-10 RX ADMIN — KETOROLAC TROMETHAMINE 15 MG: 15 INJECTION, SOLUTION INTRAMUSCULAR; INTRAVENOUS at 00:12

## 2022-11-10 RX ADMIN — VANCOMYCIN HYDROCHLORIDE 750 MG: 750 INJECTION, POWDER, LYOPHILIZED, FOR SOLUTION INTRAVENOUS at 20:07

## 2022-11-10 RX ADMIN — SODIUM CHLORIDE, PRESERVATIVE FREE 10 ML: 5 INJECTION INTRAVENOUS at 09:30

## 2022-11-10 RX ADMIN — VANCOMYCIN HYDROCHLORIDE 750 MG: 750 INJECTION, POWDER, LYOPHILIZED, FOR SOLUTION INTRAVENOUS at 09:32

## 2022-11-10 ASSESSMENT — PAIN DESCRIPTION - LOCATION
LOCATION: LEG

## 2022-11-10 ASSESSMENT — PAIN SCALES - GENERAL
PAINLEVEL_OUTOF10: 7
PAINLEVEL_OUTOF10: 6
PAINLEVEL_OUTOF10: 8
PAINLEVEL_OUTOF10: 7
PAINLEVEL_OUTOF10: 0
PAINLEVEL_OUTOF10: 0

## 2022-11-10 ASSESSMENT — PAIN DESCRIPTION - ORIENTATION
ORIENTATION: LEFT

## 2022-11-10 ASSESSMENT — PAIN DESCRIPTION - DESCRIPTORS
DESCRIPTORS: ACHING
DESCRIPTORS: STABBING

## 2022-11-10 NOTE — PROGRESS NOTES
4 Eyes Skin Assessment     NAME:  Marcial Lentz  YOB: 1970  MEDICAL RECORD NUMBER:  [id number]    The patient is being assess for  Shift Handoff    I agree that 2 RN's have performed a thorough Head to Toe Skin Assessment on the patient. ALL assessment sites listed below have been assessed. Areas assessed by both nurses:    Head, Face, Ears, Shoulders, Back, Chest, Arms, Elbows, Hands, Sacrum. Buttock, Coccyx, Ischium, and Legs. Feet and Heels        Does the Patient have a Wound?  No noted wound(s)       Herb Prevention initiated:  Yes   Wound Care Orders initiated:  No    Pressure Injury (Stage 3,4, Unstageable, DTI, NWPT, and Complex wounds) if present place referral/consult order under [de-identified] No    New and Established Ostomies if present place consult order under : NA      Nurse 1 eSignature: Electronically signed by Cande Morales RN on 11/10/22 at 6:56 PM EST    **SHARE this note so that the co-signing nurse is able to place an eSignature**    Nurse 2 eSignature: Electronically signed by Caridad Banks RN on 11/11/22 at 5:59 AM EST

## 2022-11-10 NOTE — PLAN OF CARE
Problem: Discharge Planning  Goal: Discharge to home or other facility with appropriate resources  Outcome: Progressing     Problem: Safety - Adult  Goal: Free from fall injury  Outcome: Progressing     Problem: Pain  Goal: Verbalizes/displays adequate comfort level or baseline comfort level  Outcome: Progressing     Problem: Respiratory - Adult  Goal: Achieves optimal ventilation and oxygenation  Outcome: Progressing     Problem: Cardiovascular - Adult  Goal: Maintains optimal cardiac output and hemodynamic stability  Outcome: Progressing     Problem: Infection - Adult  Goal: Absence of infection at discharge  Outcome: Progressing     Problem: Hematologic - Adult  Goal: Maintains hematologic stability  Outcome: Progressing     Problem: Skin/Tissue Integrity  Goal: Absence of new skin breakdown  Description: 1. Monitor for areas of redness and/or skin breakdown  2. Assess vascular access sites hourly  3. Every 4-6 hours minimum:  Change oxygen saturation probe site  4. Every 4-6 hours:  If on nasal continuous positive airway pressure, respiratory therapy assess nares and determine need for appliance change or resting period.   Outcome: Progressing

## 2022-11-10 NOTE — PLAN OF CARE
Problem: Discharge Planning  Goal: Discharge to home or other facility with appropriate resources  11/10/2022 1812 by Maribel Colbert RN  Outcome: Progressing  Flowsheets (Taken 11/10/2022 1812)  Discharge to home or other facility with appropriate resources:   Identify barriers to discharge with patient and caregiver   Arrange for needed discharge resources and transportation as appropriate   Identify discharge learning needs (meds, wound care, etc)   Refer to discharge planning if patient needs post-hospital services based on physician order or complex needs related to functional status, cognitive ability or social support system  11/10/2022 0438 by Baron Gilbert RN  Outcome: Progressing     Problem: Safety - Adult  Goal: Free from fall injury  11/10/2022 1812 by Maribel Colbert RN  Outcome: Progressing  [address] (Taken 11/10/2022 1812)  Free From Fall Injury: Instruct family/caregiver on patient safety  11/10/2022 0438 by Baron Gilbert RN  Outcome: Progressing     Problem: Pain  Goal: Verbalizes/displays adequate comfort level or baseline comfort level  11/10/2022 1812 by Maribel Colbert RN  Outcome: Progressing  Flowsheets (Taken 11/10/2022 1812)  Verbalizes/displays adequate comfort level or baseline comfort level:   Encourage patient to monitor pain and request assistance   Assess pain using appropriate pain scale   Administer analgesics based on type and severity of pain and evaluate response   Implement non-pharmacological measures as appropriate and evaluate response  11/10/2022 0438 by Baron Gilbert RN  Outcome: Progressing     Problem: Respiratory - Adult  Goal: Achieves optimal ventilation and oxygenation  11/10/2022 1812 by Maribel Colbert RN  Outcome: Progressing  Flowsheets (Taken 11/10/2022 1812)  Achieves optimal ventilation and oxygenation:   Encourage broncho-pulmonary hygiene including cough, deep breathe, incentive spirometry   Assess for changes in mentation and behavior   Assess for changes in respiratory status  11/10/2022 0438 by Mayur Morfin RN  Outcome: Progressing     Problem: Cardiovascular - Adult  Goal: Maintains optimal cardiac output and hemodynamic stability  11/10/2022 1812 by Clifford Dove RN  Outcome: Progressing  Flowsheets (Taken 11/8/2022 0020 by Kimberli Taylor RN)  Maintains optimal cardiac output and hemodynamic stability:   Monitor blood pressure and heart rate   Monitor urine output and notify Licensed Independent Practitioner for values outside of normal range  11/10/2022 0438 by Mayur Morfin RN  Outcome: Progressing     Problem: Infection - Adult  Goal: Absence of infection at discharge  11/10/2022 1812 by Clifford Dove RN  Outcome: Progressing  Flowsheets (Taken 11/8/2022 1600 by Fady Oliveira RN)  Absence of infection at discharge: Assess and monitor for signs and symptoms of infection  11/10/2022 0438 by Mayur Morfin RN  Outcome: Progressing     Problem: Hematologic - Adult  Goal: Maintains hematologic stability  11/10/2022 1812 by Clifford Dove RN  Outcome: Progressing  Flowsheets (Taken 11/8/2022 1600 by Fady Oliveira RN)  Maintains hematologic stability: Assess for signs and symptoms of bleeding or hemorrhage  11/10/2022 0438 by Mayur Morfin RN  Outcome: Progressing     Problem: Skin/Tissue Integrity  Goal: Absence of new skin breakdown  Description: 1. Monitor for areas of redness and/or skin breakdown  2. Assess vascular access sites hourly  3. Every 4-6 hours minimum:  Change oxygen saturation probe site  4. Every 4-6 hours:  If on nasal continuous positive airway pressure, respiratory therapy assess nares and determine need for appliance change or resting period.   11/10/2022 1812 by Clifford Dove RN  Outcome: Progressing  11/10/2022 0438 by Mayur Morfin RN  Outcome: Progressing

## 2022-11-10 NOTE — PROGRESS NOTES
4 Eyes Skin Assessment     NAME:  Jagdish Burrows  YOB: 1970  MEDICAL RECORD NUMBER:  [id number]    The patient is being assess for  Shift Handoff    I agree that 2 RN's have performed a thorough Head to Toe Skin Assessment on the patient. ALL assessment sites listed below have been assessed. Areas assessed by both nurses:    Head, Face, Ears, Shoulders, Back, Chest, Arms, Elbows, Hands, Sacrum. Buttock, Coccyx, Ischium, and Legs. Feet and Heels        Does the Patient have a Wound?  No noted wound(s)       Herb Prevention initiated:  NA   Wound Care Orders initiated:  NA    Pressure Injury (Stage 3,4, Unstageable, DTI, NWPT, and Complex wounds) if present place referral/consult order under [de-identified] NA    New and Established Ostomies if present place consult order under : NA      Nurse 1 eSignature: Electronically signed by Ludwin Queen RN on 11/10/22 at 6:38 AM EST    **SHARE this note so that the co-signing nurse is able to place an eSignature**    Nurse 2 eSignature: Electronically signed by Raleigh Castleman, RN on 11/10/22 at 7:38 AM EST

## 2022-11-10 NOTE — PROGRESS NOTES
Infectious Disease Follow up Notes  Admit Date: 11/4/2022  Hospital Day: 7    Antibiotics :   IV Vancomycin   IV Cefepime     CHIEF COMPLAINT:     Left femoral artery Pseudoaneurysm  Left groin cellulitis   IVDA    Subjective interval History :  47 y.o. woman with a past history significant for hep C, IV drug abuse, history of DVT in the past, not on anticoagulation currently admitted to the hospital secondary to left groin pain and localized swelling with associated lymphadenopathy. Patient has been injecting drugs for a long time she been using all the veins she admits to injecting into the left groin she missed her vein and hit her artery in the past.  She was using opiates in the past and now started using cocaine and Amphetamines her last use was before coming in to  hospital.  Patient has been using profanity during consultation and very irritable. She was already evaluated by vascular surgery. Blood cultures are obtained are in process. CTA of the left lower extremity in the ED indicated left distal femoral artery pseudoaneurysm with underlying local changes also associated adenopathy likely reactive lymph nodes in the groin. She was seen in the ED on 10/1/22 for the pain in the groin but she eloped from the ED before being evaluated. Labs indicate creatinine 1.1, UDS positive for cocaine amphetamine, WBC 10.5 hemoglobin 11.1 platelets 195.   Location :   Left groin swelling pain localized adenopathy  Quality : Aching      Severity : 10/10    Duration :  1 week     Timing : constant   Context :  IVDA   Modifying factors :None   Associated signs and symptoms: no FEVERS, no chills local groin pain and swelling+           Interval History : seen in CVICU s/p Left femoral artery Pseudoaneurysm repair with Homograft and sartorius flap tolerating iv ABX ok drain in place and no local redness and pedal pulse+ frustrated about stay in hospital wants to leave soon  - OR cx negative      Past Medical History:    Past Medical History:   Diagnosis Date    Hepatitis C without hepatic coma     Hx of blood clots     DEEP VEIN THROMBOSIS FROM IV DRUG USE    IV drug abuse ([facility] [address]     None for 5 mos    Methadone use        Past Surgical History:    Past Surgical History:   Procedure Laterality Date    BREAST BIOPSY Right 2015    Excisional     SECTION      FEMORAL BYPASS Left 2022    LEFT FEMORAL ARTERY REPLACEMENT WITH CADAVER VEIN AND SARTORIUS MUSCLE  FLAP performed by Yani Pablo DO at [address] [place] [address]         Current Medications:    Outpatient Medications Marked as Taking for the 22 encounter [facility] Encounter)   Medication Sig Dispense Refill    ibuprofen (ADVIL;MOTRIN) 200 MG tablet Take 800 mg by mouth in the morning, at noon, in the evening, and at bedtime      acetaminophen (TYLENOL) 500 MG tablet Take 1,000-2,000 mg by mouth in the morning, at noon, in the evening, and at bedtime         Allergies:  Pcn [penicillins]    Immunizations :   Immunization History   Administered Date(s) Administered    COVID-19, PFIZER PURPLE top, DILUTE for use, (age 15 y+), 30mcg/0.3mL 2021    Tdap (Boostrix, Adacel) 2020       Social History:      Social History     Tobacco Use    Smoking status: Every Day     Packs/day: 1.00     Years: 20.00     Pack years: 20.00     Types: Cigarettes    Smokeless tobacco: Never   Substance Use Topics    Alcohol use: Yes    Drug use: Yes     Frequency: 1.0 times per week     Types: IV, Cocaine     Social History     Tobacco Use   Smoking Status Every Day    Packs/day: 1.00    Years: 20.00    Pack years: 20.00    Types: Cigarettes   Smokeless Tobacco Never      Family History  : no DVT no COPD        REVIEW OF SYSTEMS:      Constitutional:  negative for fevers, chills, night sweats  Eyes:  negative for blurred vision, eye discharge, visual disturbance   HEENT:  negative for hearing loss, ear drainage,nasal congestion  Respiratory:  negative for cough, shortness of breath or hemoptysis   Cardiovascular:  negative for chest pain, palpitations, syncope  Gastrointestinal:  negative for nausea, vomiting, diarrhea, constipation, abdominal pain  Genitourinary:  negative for frequency, dysuria, urinary incontinence, hematuria  Hematologic/Lymphatic:  negative for easy bruising, bleeding and lymphadenopathy  Allergic/Immunologic:  negative for recurrent infections, angioedema, anaphylaxis   Endocrine:  negative for weight changes, polyuria, polydipsia and polyphagia  Musculoskeletal:  lEFT GROIN  pain + , swelling+ , decreased range of motion  Integumentary: No rashes, skin lesions  Neurological:  negative for headaches, slurred speech, unilateral weakness  Psychiatric: negative for hallucinations,confusion,agitation.                 PHYSICAL EXAM:      Vitals:    BP (!) 142/91   Pulse 99   Temp 98.5 °F (36.9 °C) (Axillary)   Resp 21   Ht 5' 2\" (1.575 m)   Wt 109 lb 5.6 oz (49.6 kg)   SpO2 96%   BMI 20.00 kg/m²     General Appearance: alert,in some  acute distress, no pallor, no icterus  needle marks+ un cooperative   Skin: warm and dry, no rash or erythema  Head: normocephalic and atraumatic  Eyes: pupils equal, round, and reactive to light, conjunctivae normal  ENT: tympanic membrane, external ear and ear canal normal bilaterally, nose without deformity, nasal mucosa and turbinates normal without polyps  Neck: supple and non-tender without mass, no thyromegaly  no cervical lymphadenopathy  Pulmonary/Chest: clear to auscultation bilaterally- no wheezes, rales or rhonchi, normal air movement, no respiratory distress  Cardiovascular: normal rate, regular rhythm, normal S1 and S2, no murmurs, rubs, clicks, or gallops, no carotid bruits  Abdomen: soft, non-tender, non-distended, normal bowel sounds, no masses or organomegaly  Extremities: no cyanosis, clubbing or edema  Musculoskeletal: normal range of motion, no joint swelling, deformity or tenderness  Integumentary: No rashes, no abnormal skin lesions, no petechiae  Neurologic: reflexes normal and symmetric, no cranial nerve deficit  Psych:  Orientation, sensorium, mood irritable           Lines: IV  Left groin post op dressing  + and drain in place++ good pedal pulses +      Data Review:    CBC:   Lab Results   Component Value Date    WBC 9.7 11/10/2022    HGB 8.2 (L) 11/10/2022    HCT 24.3 (L) 11/10/2022    MCV 86.0 11/10/2022     11/10/2022     RENAL:   Lab Results   Component Value Date    CREATININE 0.7 11/10/2022    BUN 15 11/10/2022     (L) 11/10/2022    K 5.0 11/10/2022    CL 96 (L) 11/10/2022    CO2 25 11/10/2022     SED RATE:   Lab Results   Component Value Date/Time    SEDRATE 57 11/05/2022 04:03 AM     CK: No results found for: CKTOTAL  CRP:   Lab Results   Component Value Date/Time    CRP 8.8 11/05/2022 04:03 AM     Hepatic Function Panel:   Lab Results   Component Value Date/Time    ALKPHOS 113 11/04/2022 01:16 AM    ALT 13 11/04/2022 01:16 AM    AST 21 11/04/2022 01:16 AM    PROT 9.0 11/04/2022 01:16 AM    BILITOT <0.2 11/04/2022 01:16 AM    BILIDIR 0.3 11/16/2019 12:58 AM    IBILI 0.5 11/16/2019 12:58 AM    LABALBU 3.3 11/10/2022 04:00 AM     UA:  Lab Results   Component Value Date/Time    COLORU Yellow 01/01/2019 09:39 PM    CLARITYU Clear 01/01/2019 09:39 PM    GLUCOSEU Negative 01/01/2019 09:39 PM    BILIRUBINUR Negative 01/01/2019 09:39 PM    KETUA 15 01/01/2019 09:39 PM    SPECGRAV >=1.030 01/01/2019 09:39 PM    BLOODU Negative 01/01/2019 09:39 PM    PHUR 6.5 11/05/2022 12:09 PM    PROTEINU Negative 01/01/2019 09:39 PM    UROBILINOGEN 0.2 01/01/2019 09:39 PM    NITRU Negative 01/01/2019 09:39 PM    LEUKOCYTESUR Negative 01/01/2019 09:39 PM    LABMICR Not Indicated 01/01/2019 09:39 PM    URINETYPE Not Specified 01/01/2019 09:39 PM      Urine Microscopic: No results found for: LABCAST, BACTERIA, COMU, HYALCAST, Arnol Sawyer, VANESSA  Urine Reflex to Culture:   Lab Results   Component Value Date/Time    URRFLXCULT Not Indicated 01/01/2019 09:39 PM         Ref Range & Units 11/04/22 1020   Amphetamine Screen, Urine Negative <1000ng/mL POSITIVE Abnormal     Comment: High concentrations of ephedrine/pseudoephedrine or   phenylpropanolamine may cause false positive results   for amphetamine. Therefore, confirmatory testing for   amphetamine should be considered if clinically indicated. Barbiturate Screen, Ur Negative <200 ng/mL Neg    Benzodiazepine Screen, Urine Negative <200 ng/mL Neg    Cannabinoid Scrn, Ur Negative <50 ng/mL Neg    Cocaine Metabolite Screen, Urine Negative <300 ng/mL POSITIVE Abnormal       MICRO: cultures reviewed and updated by me   Blood Culture:   Lab Results   Component Value Date/Time    BC No Growth after 4 days of incubation. 11/04/2022 02:50 AM       Respiratory Culture:  Lab Results   Component Value Date/Time    LABGRAM 1+ WBC's (Polymorphonuclear)  No organisms seen   11/08/2022 09:34 AM     AFB:No results found for: AFBSMEAR  Viral Culture:  No results found for: COVID19  Urine Culture: No results for input(s): Jason Altamirano in the last 72 hours. IMAGING:    CTA LOWER EXTREMITY LEFT W CONTRAST   Final Result   1. Large pseudoaneurysm of the distal left femoral artery likely correlating   to history of IV drug abuse. 2. There is interval thickening and inflammation along the femoral artery and   the proximal superficial femoral artery suggesting vasculitis in association   with above. 3. Cellulitis of the left groin with multiple reactive enlarged lymph nodes.            Component Ref Range & Units 11/05/22 1255   HIV Ag/Ab Non-reactive Non-Reactive    HIV-1 Antibody Non-reactive Non-Reactive    HIV ANTIGEN Non-reactive Non-Reactive    HIV-2 Ab Non-reactive Non-Reactive    Resulting Agency  [address] [facility] [facility]        Specimen Collected: 11/05/22 1255 Last Resulted: 11/05/22 1446 View Other Order Details          All the pertinent images and reports for the current Hospitalization were reviewed by me     Scheduled Meds:   sodium chloride flush  5-40 mL IntraVENous 2 times per day    enoxaparin  30 mg SubCUTAneous Daily    ketorolac  15 mg IntraVENous Q6H    aspirin  81 mg Oral Daily    vancomycin  750 mg IntraVENous Q12H    sodium chloride flush  5-40 mL IntraVENous 2 times per day    cefepime  2,000 mg IntraVENous Q12H    vancomycin (VANCOCIN) intermittent dosing (placeholder)   Other RX Placeholder    nicotine  1 patch TransDERmal Daily    lidocaine 1 % injection  5 mL IntraDERmal Once       Continuous Infusions:   sodium chloride      sodium chloride Stopped (11/09/22 0316)       PRN Meds:  bisacodyl, sodium chloride flush, sodium chloride, calcium carbonate, sodium chloride flush, sodium chloride, polyethylene glycol, acetaminophen **OR** acetaminophen, ondansetron, oxyCODONE **OR** oxyCODONE      Assessment:     Patient Active Problem List   Diagnosis    Breast mass, right    Cellulitis    Pseudoaneurysm (HCC)    Femoral artery pseudo-aneurysm, left (HCC)    IV drug abuse (HCC)    Inguinal adenopathy    Hep C w/o coma, chronic (HCC)    Elevated erythrocyte sedimentation rate    CRP elevated     Left Distal Femoral artery large Pseudo aneurysm+  IVDA on going  H/o Direct Needle injection into Left groin  Cocaine abuse  Amphetamine abuse  Hep C+Ve  Left inguinal adenopathy  BMI at  21   CTA very abnormal involving the Left distal femoral artery Pseudo aneurysm         Unfortunately major complication from direct needle injury to Leti femoral artery and IVDA has been using Cocaine and Amphetamine - seen by vascular team concerned about rupture and bleed - will need IV abx given the presentation concern for local infection she has no fever or reactive WBC so no direct bacterial invasion yet - possible local reaction risk for seeding and systemic spread       Blood cx -ve and HIV screen -ve    S/p Left Femoral artery Pseudoaneurysm surgery and drain in place OR cx in process thus far negative -    Left groin dressing intact and JOLEEN in place vascular notes reviewed      Labs, Microbiology, Radiology and all the pertinent results from current hospitalization and  care every where were reviewed  by me as a part of the evaluation   Plan:   Cont IV Vancomycin  x 750 mg q 12 HRS  IV Cefepime x 2 gm q 12 HR  Blood cx NGTD  ESR 57 , CRP 8.8   HIV screen  -ve  Vascular surgery following   Watch for Withdrawal   S/p  surgery notes reviewed  and OR tissue cx in process NGTD   Depending on her progress  we can choose oral abx when ready  as the cx remain negative  Can choose  oral Bactrim to cover skin porfirio and MSSA, gram -ve would avoid Quinolones given the Pseudoaneurysm repair due to their effects on vascular intima          Discussed with patient/Family and Nursing   Risk of Complications/Morbidity: High      Illness(es)/ Infection present that pose threat to bodily function. There is potential for severe exacerbation of infection/side effects of treatment. Therapy requires intensive monitoring for antimicrobial agent toxicity. Discussed with patient/Family and Nursing staff     Thanks for allowing me to participate in your patient's care and please call me with any questions or concerns.     Manjit Cullen MD  Infectious Disease  [facility] [place] [facility] Physician  Phone: [phone]   Fax : [phone]

## 2022-11-10 NOTE — PROGRESS NOTES
Hospitalist Progress Note      PCP: Barbara Flores MD    Date of Admission: 11/4/2022      Hospital Course: Patient with history of IV drug use admitted with left groin pain and swelling, found to have a large pseudoaneurysm due to IV drug use, underwent left common femoral artery replacement using a cadaveric vein graft with sartorius muscle flap on 11/8/2022. Subjective: pt seen and examined, c/o no BM for a few days now, bowel regimen added.        Medications:  Reviewed    Infusion Medications    sodium chloride      sodium chloride Stopped (11/09/22 0316)     Scheduled Medications    sodium chloride flush  5-40 mL IntraVENous 2 times per day    enoxaparin  30 mg SubCUTAneous Daily    aspirin  81 mg Oral Daily    vancomycin  750 mg IntraVENous Q12H    sodium chloride flush  5-40 mL IntraVENous 2 times per day    cefepime  2,000 mg IntraVENous Q12H    vancomycin (VANCOCIN) intermittent dosing (placeholder)   Other RX Placeholder    nicotine  1 patch TransDERmal Daily    lidocaine 1 % injection  5 mL IntraDERmal Once     PRN Meds: bisacodyl, sodium chloride flush, sodium chloride, calcium carbonate, sodium chloride flush, sodium chloride, polyethylene glycol, acetaminophen **OR** acetaminophen, ondansetron, oxyCODONE **OR** oxyCODONE      Intake/Output Summary (Last 24 hours) at 11/10/2022 1733  Last data filed at 11/10/2022 1600  Gross per 24 hour   Intake 240 ml   Output 2875 ml   Net -2635 ml         Physical Exam Performed:    BP (!) 136/92   Pulse (!) 112   Temp 97.6 °F (36.4 °C) (Axillary)   Resp 12   Ht 5' 2\" (1.575 m)   Wt 109 lb 5.6 oz (49.6 kg)   SpO2 100%   BMI 20.00 kg/m²     On exam    Alert and oriented x3  Anicteric   CVS s1 and s2, rrr  Lungs CTA   Abdomen soft and non tender   No edema , pulses +        Labs:   Recent Labs     11/08/22  0618 11/09/22  0425 11/10/22  0400   WBC 8.5 8.6 9.7   HGB 11.8* 8.4* 8.2*   HCT 35.1* 25.2* 24.3*   * 442 391       Recent Labs 11/08/22  0618 11/09/22  0425 11/10/22  0400   * 133* 128*   K 5.1 5.0 5.0   CL 95* 97* 96*   CO2 28 22 25   BUN 11 21* 15   CREATININE 0.8 0.9 0.7   CALCIUM 9.8 9.0 8.8   PHOS 3.5 3.5 2.7       No results for input(s): AST, ALT, BILIDIR, BILITOT, ALKPHOS in the last 72 hours. No results for input(s): INR in the last 72 hours. No results for input(s): Laban Bel[place] in the last 72 hours. Urinalysis:      Lab Results   Component Value Date/Time    NITRU Negative 01/01/2019 09:39 PM    BLOODU Negative 01/01/2019 09:39 PM    SPECGRAV >=1.030 01/01/2019 09:39 PM    GLUCOSEU Negative 01/01/2019 09:39 PM       Radiology:  CTA LOWER EXTREMITY LEFT W CONTRAST   Final Result   1. Large pseudoaneurysm of the distal left femoral artery likely correlating   to history of IV drug abuse. 2. There is interval thickening and inflammation along the femoral artery and   the proximal superficial femoral artery suggesting vasculitis in association   with above. 3. Cellulitis of the left groin with multiple reactive enlarged lymph nodes.                  Assessment/Plan:    Active Hospital Problems    Diagnosis     IV drug abuse ([facility] [address] [F19.10]      Priority: Medium    Inguinal adenopathy [R59.0]      Priority: Medium    Hep C w/o coma, chronic (HCC) [B18.2]      Priority: Medium    Elevated erythrocyte sedimentation rate [R70.0]      Priority: Medium    CRP elevated [R79.82]      Priority: Medium    Cellulitis [L03.90]      Priority: Medium    Pseudoaneurysm (HCC) [I72.9]      Priority: Medium    Femoral artery pseudo-aneurysm, left (HCC) [I72.4]      Priority: Medium     Left groin large pseudoaneurysm due to IV drug use status post repair by vascular team on 11/8/2022  Cont bed rest till tomm  PT/OT when ok to mobilize  Cont IV abx per ID recomm  No growth so far from intraop cx   Hyponatremia : monitor   Anemia : acute on chronic : monitor post op  Bowel regimen   Monitor Is and Os       DVT Prophylaxis: Lovenox  Diet: ADULT DIET; Regular  Code Status: Full Code  PT/OT     Dispo -possible discharge home in 2 to 3 days pending progress.   And final recommendations from consult teams           Geni Savage MD

## 2022-11-10 NOTE — PROGRESS NOTES
[facility]  Progress Note    11/10/2022 10:23 AM    Chief Complaint: Left Groin Pain    Reason for Consult: Left Groin Abscess    POD #2 Left Common Femoral Artery Replacement with Cadaver Graft and Sartorius Muscle Flap with Dr. Abraham Woodruff    Subjective: Pt seen resting in bed in CVU, KALYN dressing removed and replaced - adequate seal achieved    Vital Signs:  Vitals:    11/10/22 0400 11/10/22 0500 11/10/22 0600 11/10/22 0929   BP: 131/85 139/86 (!) 142/91 118/70   Pulse: 88 (!) 112 99 (!) 108   Resp: 21 21 21 13   Temp: 98.5 °F (36.9 °C)   97.4 °F (36.3 °C)   TempSrc: Axillary   Oral   SpO2: 97% 95% 96% 97%   Weight:   109 lb 5.6 oz (49.6 kg)    Height:           I/O:    Intake/Output Summary (Last 24 hours) at 11/10/2022 1023  Last data filed at 11/10/2022 0929  Gross per 24 hour   Intake 1627.09 ml   Output 2480 ml   Net -852.91 ml         Physical Exam:   General: no apparent distress, alert and oriented, afebrile  Chest/Lungs: non labored breathing no tachypnea, retractions or cyanosis  Abdomen: active bowel sounds, abdomen soft and non-distended  Extremities: normal ROM, KALYN dressing removed and replaced, incision intact with staples, minimal drainage seen, KALYN dressing replaced, good seal achieved, small amount of drainage from JOLEEN drain site - covered with new gauze, JOLEEN drain remains in place, Left Leg warm and able to move leg, foot and toes without difficulty  Vascular: extremities warm and well perfused, no signs of cyanosis or ischemia    Pulses:  -L DP: +2/4 palpable    Labs:   Lab Results   Component Value Date/Time     11/10/2022 04:00 AM    K 5.0 11/10/2022 04:00 AM    K 3.7 11/16/2019 12:58 AM    CL 96 11/10/2022 04:00 AM    CO2 25 11/10/2022 04:00 AM    BUN 15 11/10/2022 04:00 AM    CREATININE 0.7 11/10/2022 04:00 AM    GFRAA >60 11/16/2019 12:58 AM    LABGLOM >60 11/10/2022 04:00 AM    GLUCOSE 102 11/10/2022 04:00 AM    PHOS 2.7 11/10/2022 04:00 AM    MG 1.70 11/10/2022 04:00 AM    CALCIUM 8.8 11/10/2022 04:00 AM     Lab Results   Component Value Date/Time    WBC 9.7 11/10/2022 04:00 AM    RBC 2.83 11/10/2022 04:00 AM    HGB 8.2 11/10/2022 04:00 AM    HCT 24.3 11/10/2022 04:00 AM    MCV 86.0 11/10/2022 04:00 AM    RDW 13.9 11/10/2022 04:00 AM     11/10/2022 04:00 AM   No results found for: INR, PROTIME     Scheduled Meds:    sodium chloride flush  5-40 mL IntraVENous 2 times per day    enoxaparin  30 mg SubCUTAneous Daily    ketorolac  15 mg IntraVENous Q6H    aspirin  81 mg Oral Daily    vancomycin  750 mg IntraVENous Q12H    sodium chloride flush  5-40 mL IntraVENous 2 times per day    cefepime  2,000 mg IntraVENous Q12H    vancomycin (VANCOCIN) intermittent dosing (placeholder)   Other RX Placeholder    nicotine  1 patch TransDERmal Daily    lidocaine 1 % injection  5 mL IntraDERmal Once     Continuous Infusions:    sodium chloride      sodium chloride Stopped (11/09/22 0316)       Imaging:   CTA LLE - 11/4/2022  Impression  1. Large pseudoaneurysm of the distal left femoral artery likely correlating to history of IV drug abuse. 2. There is interval thickening and inflammation along the femoral artery and the proximal superficial femoral artery suggesting vasculitis in association with above. 3. Cellulitis of the left groin with multiple reactive enlarged lymph nodes. Assessment:   -Left Distal Femoral Artery Pseudoaneurysm  -POD #2 Left Common Femoral Artery Replacement with Cadaver Graft and Sartorius Muscle Flap with Dr. Negin Medina  -KALYN dressing replaced - adequate seal achieved - flashing green  -JOLEEN drain with 50 mL out since yesterday  -Left Foot warm, DP palpable    Plan:  -Continue strict bedrest until tomorrow  -Keep KALYN dressing in place   -Keep JOLEEN drain in place - consider removal tomorrow  -IV Antibiotics - per ID  -PRN suppository     All pertinent information and plan of care discussed with Dr. Jacquelin Seals.     All questions and concerns were addressed with the patient. I have discussed the above stated plan with the patient and the nurse. The patient verbalized understanding and agreed with the plan. Thank you for allowing to us to participate in the care of Sher Reid      Electronically signed by SANYA Sifuentes - CNP on 11/10/2022 at 10:23 AM     Pt seen and examined. for DIAGNOSIS OF basement of femoral artery with cadaver graft with sartorius flap agree with SANYA Sifuentes's note. Labs reviewed. Vitals   Vitals:    11/10/22 0400 11/10/22 0500 11/10/22 0600 11/10/22 0929   BP: 131/85 139/86 (!) 142/91 118/70   Pulse: 88 (!) 112 99 (!) 108   Resp: 21 21 21 13   Temp: 98.5 °F (36.9 °C)   97.4 °F (36.3 °C)   TempSrc: Axillary   Oral   SpO2: 97% 95% 96% 97%   Weight:   109 lb 5.6 oz (49.6 kg)    Height:           [place] was not working so it was removed completely drain is still in place and draining adequate but not large numbers wound incision looks very good new kathrine applied and we did get this 1 to seal bedrest for 1 more day palpable dorsalis pedis pulse

## 2022-11-11 LAB
ALBUMIN SERPL-MCNC: 3.3 G/DL (ref 3.4–5)
ANION GAP SERPL CALCULATED.3IONS-SCNC: 12 MMOL/L (ref 3–16)
BASOPHILS ABSOLUTE: 0.1 K/UL (ref 0–0.2)
BASOPHILS RELATIVE PERCENT: 0.8 %
BLOOD BANK DISPENSE STATUS: NORMAL
BLOOD BANK DISPENSE STATUS: NORMAL
BLOOD BANK PRODUCT CODE: NORMAL
BLOOD BANK PRODUCT CODE: NORMAL
BPU ID: NORMAL
BPU ID: NORMAL
BUN BLDV-MCNC: 13 MG/DL (ref 7–20)
CALCIUM SERPL-MCNC: 9.1 MG/DL (ref 8.3–10.6)
CHLORIDE BLD-SCNC: 93 MMOL/L (ref 99–110)
CO2: 24 MMOL/L (ref 21–32)
CREAT SERPL-MCNC: 0.7 MG/DL (ref 0.6–1.1)
DESCRIPTION BLOOD BANK: NORMAL
DESCRIPTION BLOOD BANK: NORMAL
EOSINOPHILS ABSOLUTE: 0.2 K/UL (ref 0–0.6)
EOSINOPHILS RELATIVE PERCENT: 2.4 %
FOLATE: 10.08 NG/ML (ref 4.78–24.2)
GFR SERPL CREATININE-BSD FRML MDRD: >60 ML/MIN/{1.73_M2}
GLUCOSE BLD-MCNC: 107 MG/DL (ref 70–99)
HCT VFR BLD CALC: 25.4 % (ref 36–48)
HEMOGLOBIN: 8.4 G/DL (ref 12–16)
LYMPHOCYTES ABSOLUTE: 2.2 K/UL (ref 1–5.1)
LYMPHOCYTES RELATIVE PERCENT: 22.9 %
MAGNESIUM: 1.8 MG/DL (ref 1.8–2.4)
MCH RBC QN AUTO: 28.3 PG (ref 26–34)
MCHC RBC AUTO-ENTMCNC: 33.2 G/DL (ref 31–36)
MCV RBC AUTO: 85.3 FL (ref 80–100)
MONOCYTES ABSOLUTE: 0.8 K/UL (ref 0–1.3)
MONOCYTES RELATIVE PERCENT: 8.2 %
NEUTROPHILS ABSOLUTE: 6.3 K/UL (ref 1.7–7.7)
NEUTROPHILS RELATIVE PERCENT: 65.7 %
PDW BLD-RTO: 13.9 % (ref 12.4–15.4)
PHOSPHORUS: 3.4 MG/DL (ref 2.5–4.9)
PLATELET # BLD: 424 K/UL (ref 135–450)
PMV BLD AUTO: 7.7 FL (ref 5–10.5)
POTASSIUM SERPL-SCNC: 4.8 MMOL/L (ref 3.5–5.1)
RBC # BLD: 2.98 M/UL (ref 4–5.2)
SODIUM BLD-SCNC: 129 MMOL/L (ref 136–145)
VITAMIN B-12: 335 PG/ML (ref 211–911)
WBC # BLD: 9.6 K/UL (ref 4–11)

## 2022-11-11 PROCEDURE — 6370000000 HC RX 637 (ALT 250 FOR IP): Performed by: INTERNAL MEDICINE

## 2022-11-11 PROCEDURE — 36592 COLLECT BLOOD FROM PICC: CPT

## 2022-11-11 PROCEDURE — 6370000000 HC RX 637 (ALT 250 FOR IP): Performed by: STUDENT IN AN ORGANIZED HEALTH CARE EDUCATION/TRAINING PROGRAM

## 2022-11-11 PROCEDURE — 83735 ASSAY OF MAGNESIUM: CPT

## 2022-11-11 PROCEDURE — 99232 SBSQ HOSP IP/OBS MODERATE 35: CPT | Performed by: INTERNAL MEDICINE

## 2022-11-11 PROCEDURE — 85025 COMPLETE CBC W/AUTO DIFF WBC: CPT

## 2022-11-11 PROCEDURE — 80069 RENAL FUNCTION PANEL: CPT

## 2022-11-11 PROCEDURE — 6360000002 HC RX W HCPCS: Performed by: STUDENT IN AN ORGANIZED HEALTH CARE EDUCATION/TRAINING PROGRAM

## 2022-11-11 PROCEDURE — 82607 VITAMIN B-12: CPT

## 2022-11-11 PROCEDURE — APPNB30 APP NON BILLABLE TIME 0-30 MINS: Performed by: NURSE PRACTITIONER

## 2022-11-11 PROCEDURE — 2580000003 HC RX 258: Performed by: INTERNAL MEDICINE

## 2022-11-11 PROCEDURE — 6360000002 HC RX W HCPCS: Performed by: INTERNAL MEDICINE

## 2022-11-11 PROCEDURE — 94760 N-INVAS EAR/PLS OXIMETRY 1: CPT

## 2022-11-11 PROCEDURE — 2140000000 HC CCU INTERMEDIATE R&B

## 2022-11-11 PROCEDURE — 2580000003 HC RX 258: Performed by: STUDENT IN AN ORGANIZED HEALTH CARE EDUCATION/TRAINING PROGRAM

## 2022-11-11 PROCEDURE — 82746 ASSAY OF FOLIC ACID SERUM: CPT

## 2022-11-11 PROCEDURE — 99024 POSTOP FOLLOW-UP VISIT: CPT | Performed by: SURGERY

## 2022-11-11 RX ADMIN — CEFEPIME 2000 MG: 2 INJECTION, POWDER, FOR SOLUTION INTRAVENOUS at 03:17

## 2022-11-11 RX ADMIN — OXYCODONE HYDROCHLORIDE 10 MG: 10 TABLET ORAL at 19:46

## 2022-11-11 RX ADMIN — ASPIRIN 81 MG 81 MG: 81 TABLET ORAL at 07:49

## 2022-11-11 RX ADMIN — VANCOMYCIN HYDROCHLORIDE 750 MG: 750 INJECTION, POWDER, LYOPHILIZED, FOR SOLUTION INTRAVENOUS at 11:58

## 2022-11-11 RX ADMIN — CEFEPIME 2000 MG: 2 INJECTION, POWDER, FOR SOLUTION INTRAVENOUS at 15:52

## 2022-11-11 RX ADMIN — SODIUM CHLORIDE 5 ML/HR: 9 INJECTION, SOLUTION INTRAVENOUS at 03:15

## 2022-11-11 RX ADMIN — POLYETHYLENE GLYCOL 3350 17 G: 17 POWDER, FOR SOLUTION ORAL at 07:48

## 2022-11-11 RX ADMIN — ENOXAPARIN SODIUM 30 MG: 100 INJECTION SUBCUTANEOUS at 07:48

## 2022-11-11 RX ADMIN — OXYCODONE HYDROCHLORIDE 10 MG: 10 TABLET ORAL at 11:56

## 2022-11-11 RX ADMIN — OXYCODONE HYDROCHLORIDE 10 MG: 10 TABLET ORAL at 03:21

## 2022-11-11 ASSESSMENT — PAIN DESCRIPTION - ORIENTATION
ORIENTATION: LEFT
ORIENTATION: LEFT

## 2022-11-11 ASSESSMENT — PAIN DESCRIPTION - LOCATION
LOCATION: LEG
LOCATION: LEG

## 2022-11-11 ASSESSMENT — PAIN SCALES - GENERAL
PAINLEVEL_OUTOF10: 0
PAINLEVEL_OUTOF10: 8
PAINLEVEL_OUTOF10: 7
PAINLEVEL_OUTOF10: 7
PAINLEVEL_OUTOF10: 0
PAINLEVEL_OUTOF10: 0

## 2022-11-11 ASSESSMENT — PAIN DESCRIPTION - DESCRIPTORS
DESCRIPTORS: SHOOTING;SHARP
DESCRIPTORS: ACHING

## 2022-11-11 NOTE — PLAN OF CARE
Problem: Discharge Planning  Goal: Discharge to home or other facility with appropriate resources  11/11/2022 0105 by Kalpana Fierro RN  Outcome: Progressing  11/10/2022 1812 by Melani Goodwin RN  Outcome: Progressing  Flowsheets (Taken 11/10/2022 1812)  Discharge to home or other facility with appropriate resources:   Identify barriers to discharge with patient and caregiver   Arrange for needed discharge resources and transportation as appropriate   Identify discharge learning needs (meds, wound care, etc)   Refer to discharge planning if patient needs post-hospital services based on physician order or complex needs related to functional status, cognitive ability or social support system     Problem: Safety - Adult  Goal: Free from fall injury  11/11/2022 0105 by Kalpana Fierro RN  Outcome: Progressing  11/10/2022 1812 by Melani Goodwin RN  Outcome: Progressing  Flowsheets (Taken 11/10/2022 1812)  Free From Fall Injury: Instruct family/caregiver on patient safety     Problem: Pain  Goal: Verbalizes/displays adequate comfort level or baseline comfort level  11/11/2022 0105 by Kalpana Fierro RN  Outcome: Progressing  11/10/2022 1812 by Melani Goodwin RN  Outcome: Progressing  Flowsheets (Taken 11/10/2022 1812)  Verbalizes/displays adequate comfort level or baseline comfort level:   Encourage patient to monitor pain and request assistance   Assess pain using appropriate pain scale   Administer analgesics based on type and severity of pain and evaluate response   Implement non-pharmacological measures as appropriate and evaluate response     Problem: Respiratory - Adult  Goal: Achieves optimal ventilation and oxygenation  11/11/2022 0105 by Kalpana Fierro RN  Outcome: Progressing  11/10/2022 1812 by Melani Goodwin RN  Outcome: Progressing  Flowsheets (Taken 11/10/2022 1812)  Achieves optimal ventilation and oxygenation:   Encourage broncho-pulmonary hygiene including cough, deep breathe, incentive spirometry   Assess for changes in mentation and behavior   Assess for changes in respiratory status     Problem: Cardiovascular - Adult  Goal: Maintains optimal cardiac output and hemodynamic stability  11/11/2022 0105 by Murrell Kussmaul, RN  Outcome: Progressing  Flowsheets (Taken 11/10/2022 2000)  Maintains optimal cardiac output and hemodynamic stability:   Monitor blood pressure and heart rate   Monitor urine output and notify Licensed Independent Practitioner for values outside of normal range  11/10/2022 1812 by Ny Marvin RN  Outcome: Alyson Labor (Taken 11/8/2022 0020 by Bennie Mcburney, RN)  Maintains optimal cardiac output and hemodynamic stability:   Monitor blood pressure and heart rate   Monitor urine output and notify Licensed Independent Practitioner for values outside of normal range     Problem: Infection - Adult  Goal: Absence of infection at discharge  11/11/2022 0105 by Murrell Kussmaul, RN  Outcome: Progressing  [address] (Taken 11/10/2022 2000)  Absence of infection at discharge:   Assess and monitor for signs and symptoms of infection   Monitor all insertion sites i.e., indwelling lines, tubes and drains  11/10/2022 1812 by Ny Marvin RN  Outcome: Progressing  Flowsheets (Taken 11/8/2022 1600 by Jorden Wright RN)  Absence of infection at discharge: Assess and monitor for signs and symptoms of infection     Problem: Hematologic - Adult  Goal: Maintains hematologic stability  11/11/2022 0105 by Murrell Kussmaul, RN  Outcome: Progressing  Flowsheets (Taken 11/10/2022 2000)  Maintains hematologic stability: Assess for signs and symptoms of bleeding or hemorrhage  11/10/2022 1812 by Ny Marvin RN  Outcome: Progressing  Flowsheets (Taken 11/8/2022 1600 by Jorden Wright RN)  Maintains hematologic stability: Assess for signs and symptoms of bleeding or hemorrhage     Problem: Skin/Tissue Integrity  Goal: Absence of new skin breakdown  Description: 1.   Monitor for

## 2022-11-11 NOTE — PROGRESS NOTES
Hospitalist Progress Note      PCP: Dominik Mello MD    Date of Admission: 11/4/2022      Hospital Course: Patient with history of IV drug use admitted with left groin pain and swelling, found to have a large pseudoaneurysm due to IV drug use, underwent left common femoral artery replacement using a cadaveric vein graft with sartorius muscle flap on 11/8/2022. Subjective: pt seen and examined. Up in chair, family at bedside. Looks happy to be up and moving.          Medications:  Reviewed    Infusion Medications    sodium chloride      sodium chloride Stopped (11/11/22 0317)     Scheduled Medications    cefepime  2,000 mg IntraVENous Q12H    vancomycin  750 mg IntraVENous Q12H    polyethylene glycol  17 g Oral Daily    sodium chloride flush  5-40 mL IntraVENous 2 times per day    enoxaparin  30 mg SubCUTAneous Daily    aspirin  81 mg Oral Daily    sodium chloride flush  5-40 mL IntraVENous 2 times per day    vancomycin (VANCOCIN) intermittent dosing (placeholder)   Other RX Placeholder    nicotine  1 patch TransDERmal Daily    lidocaine 1 % injection  5 mL IntraDERmal Once     PRN Meds: bisacodyl, bisacodyl, sodium chloride flush, sodium chloride, calcium carbonate, sodium chloride flush, sodium chloride, acetaminophen **OR** acetaminophen, ondansetron, oxyCODONE **OR** oxyCODONE      Intake/Output Summary (Last 24 hours) at 11/11/2022 1233  Last data filed at 11/11/2022 0930  Gross per 24 hour   Intake 1525.3 ml   Output 2605 ml   Net -1079.7 ml         Physical Exam Performed:    /80   Pulse 95   Temp 98.2 °F (36.8 °C) (Oral)   Resp 17   Ht 5' 2\" (1.575 m)   Wt 113 lb 5.1 oz (51.4 kg)   SpO2 96%   BMI 20.73 kg/m²     On exam    Alert and oriented x3  Anicteric   CVS s1 and s2, rrr  Lungs CTA   Abdomen soft and non tender  No edema , pulses +          Labs:   Recent Labs     11/09/22  0425 11/10/22  0400 11/11/22  0435   WBC 8.6 9.7 9.6   HGB 8.4* 8.2* 8.4*   HCT 25.2* 24.3* 25.4*   PLT 442 391 424       Recent Labs     11/09/22  0425 11/10/22  0400 11/11/22  0435   * 128* 129*   K 5.0 5.0 4.8   CL 97* 96* 93*   CO2 22 25 24   BUN 21* 15 13   CREATININE 0.9 0.7 0.7   CALCIUM 9.0 8.8 9.1   PHOS 3.5 2.7 3.4       No results for input(s): AST, ALT, BILIDIR, BILITOT, ALKPHOS in the last 72 hours. No results for input(s): INR in the last 72 hours. No results for input(s): Konrad Gubler in the last 72 hours. Urinalysis:      Lab Results   Component Value Date/Time    NITRU Negative 01/01/2019 09:39 PM    BLOODU Negative 01/01/2019 09:39 PM    SPECGRAV >=1.030 01/01/2019 09:39 PM    GLUCOSEU Negative 01/01/2019 09:39 PM       Radiology:  CTA LOWER EXTREMITY LEFT W CONTRAST   Final Result   1. Large pseudoaneurysm of the distal left femoral artery likely correlating   to history of IV drug abuse. 2. There is interval thickening and inflammation along the femoral artery and   the proximal superficial femoral artery suggesting vasculitis in association   with above. 3. Cellulitis of the left groin with multiple reactive enlarged lymph nodes.                  Assessment/Plan:    Active Hospital Problems    Diagnosis     IV drug abuse ([facility] [address] [F19.10]      Priority: Medium    Inguinal adenopathy [R59.0]      Priority: Medium    Hep C w/o coma, chronic (HCC) [B18.2]      Priority: Medium    Elevated erythrocyte sedimentation rate [R70.0]      Priority: Medium    CRP elevated [R79.82]      Priority: Medium    Cellulitis [L03.90]      Priority: Medium    Pseudoaneurysm (HCC) [I72.9]      Priority: Medium    Femoral artery pseudo-aneurysm, left (HCC) [I72.4]      Priority: Medium     Left groin large pseudoaneurysm due to IV drug use status post repair by vascular team on 11/8/2022  Ok to mobilize per surgery , PT/OT  Cont abx per ID recomm, follow up with ID for final recomm at dc   Anemia : stable, check anemia w/u  Hyponatremia : monitor   Anemia : acute on chronic : monitor post op  Bowel regimen   Monitor Is and Os       DVT Prophylaxis: Lovenox  Diet: ADULT DIET;  Regular  Code Status: Full Code  PT/OT     Dispo -possible discharge home in 1-2 days pending clearance by surgery team            Emory De Leon MD

## 2022-11-11 NOTE — CARE COORDINATION
Chart Reviewed. Pt/OT daveals pending. Dr Cinthya Sanches suggests Oral ABX for discharge at this time. Goal is home with a friend and her 25year old son as long as she can get around walking. Was offered [facility]  but she does not want to go to Drug Rehab. She did ask for mental health counseling; provided list of resources. Following for DC needs.   Renae [place], [place]     Case Management   [phone]    11/11/2022  10:20 AM

## 2022-11-11 NOTE — PROGRESS NOTES
Infectious Disease Follow up Notes  Admit Date: 11/4/2022  Hospital Day: 8    Antibiotics :   IV Vancomycin   IV Cefepime     CHIEF COMPLAINT:     Left femoral artery Pseudoaneurysm  Left groin cellulitis   IVDA    Subjective interval History :  47 y.o. woman with a past history significant for hep C, IV drug abuse, history of DVT in the past, not on anticoagulation currently admitted to the hospital secondary to left groin pain and localized swelling with associated lymphadenopathy. Patient has been injecting drugs for a long time she been using all the veins she admits to injecting into the left groin she missed her vein and hit her artery in the past.  She was using opiates in the past and now started using cocaine and Amphetamines her last use was before coming in to  hospital.  Patient has been using profanity during consultation and very irritable. She was already evaluated by vascular surgery. Blood cultures are obtained are in process. CTA of the left lower extremity in the ED indicated left distal femoral artery pseudoaneurysm with underlying local changes also associated adenopathy likely reactive lymph nodes in the groin. She was seen in the ED on 10/1/22 for the pain in the groin but she eloped from the ED before being evaluated. Labs indicate creatinine 1.1, UDS positive for cocaine amphetamine, WBC 10.5 hemoglobin 11.1 platelets 507.   Location :   Left groin swelling pain localized adenopathy  Quality : Aching      Severity : 10/10    Duration :  1 week     Timing : constant   Context :  IVDA   Modifying factors :None   Associated signs and symptoms: no FEVERS, no chills local groin pain and swelling+           Interval History : seen in CVICU now allowed to ambulate in the room and kathrine DRESSING+ pain controlled no fevers no chills and OR cx negative     Past Medical History:    Past Medical History:   Diagnosis Date Hepatitis C without hepatic coma     Hx of blood clots     DEEP VEIN THROMBOSIS FROM IV DRUG USE    IV drug abuse ([facility] [address]     None for 5 mos    Methadone use        Past Surgical History:    Past Surgical History:   Procedure Laterality Date    BREAST BIOPSY Right 2015    Excisional     SECTION      FEMORAL BYPASS Left 2022    LEFT FEMORAL ARTERY REPLACEMENT WITH CADAVER VEIN AND SARTORIUS MUSCLE  FLAP performed by Rosa Montiel DO at [address]         Current Medications:    Outpatient Medications Marked as Taking for the 22 encounter [facility]OS[facility] Encounter)   Medication Sig Dispense Refill    ibuprofen (ADVIL;MOTRIN) 200 MG tablet Take 800 mg by mouth in the morning, at noon, in the evening, and at bedtime      acetaminophen (TYLENOL) 500 MG tablet Take 1,000-2,000 mg by mouth in the morning, at noon, in the evening, and at bedtime         Allergies:  Pcn [penicillins]    Immunizations :   Immunization History   Administered Date(s) Administered    COVID-19, PFIZER PURPLE top, DILUTE for use, (age 15 y+), 30mcg/0.3mL 2021    Tdap (Boostrix, Adacel) 2020       Social History:      Social History     Tobacco Use    Smoking status: Every Day     Packs/day: 1.00     Years: 20.00     Pack years: 20.00     Types: Cigarettes    Smokeless tobacco: Never   Substance Use Topics    Alcohol use: Yes    Drug use: Yes     Frequency: 1.0 times per week     Types: IV, Cocaine     Social History     Tobacco Use   Smoking Status Every Day    Packs/day: 1.00    Years: 20.00    Pack years: 20.00    Types: Cigarettes   Smokeless Tobacco Never      Family History  : no DVT no COPD        REVIEW OF SYSTEMS:      Constitutional:  negative for fevers, chills, night sweats  Eyes:  negative for blurred vision, eye discharge, visual disturbance   HEENT:  negative for hearing loss, ear drainage,nasal congestion  Respiratory:  negative for cough, shortness of breath or hemoptysis Cardiovascular:  negative for chest pain, palpitations, syncope  Gastrointestinal:  negative for nausea, vomiting, diarrhea, constipation, abdominal pain  Genitourinary:  negative for frequency, dysuria, urinary incontinence, hematuria  Hematologic/Lymphatic:  negative for easy bruising, bleeding and lymphadenopathy  Allergic/Immunologic:  negative for recurrent infections, angioedema, anaphylaxis   Endocrine:  negative for weight changes, polyuria, polydipsia and polyphagia  Musculoskeletal:  lEFT GROIN  pain + , swelling+ , decreased range of motion  Integumentary: No rashes, skin lesions  Neurological:  negative for headaches, slurred speech, unilateral weakness  Psychiatric: negative for hallucinations,confusion,agitation.                 PHYSICAL EXAM:      Vitals:    /80   Pulse 95   Temp 98.2 °F (36.8 °C) (Oral)   Resp 17   Ht 5' 2\" (1.575 m)   Wt 113 lb 5.1 oz (51.4 kg)   SpO2 96%   BMI 20.73 kg/m²     General Appearance: alert,in some  acute distress, no pallor, no icterus  needle marks+ un cooperative   Skin: warm and dry, no rash or erythema  Head: normocephalic and atraumatic  Eyes: pupils equal, round, and reactive to light, conjunctivae normal  ENT: tympanic membrane, external ear and ear canal normal bilaterally, nose without deformity, nasal mucosa and turbinates normal without polyps  Neck: supple and non-tender without mass, no thyromegaly  no cervical lymphadenopathy  Pulmonary/Chest: clear to auscultation bilaterally- no wheezes, rales or rhonchi, normal air movement, no respiratory distress  Cardiovascular: normal rate, regular rhythm, normal S1 and S2, no murmurs, rubs, clicks, or gallops, no carotid bruits  Abdomen: soft, non-tender, non-distended, normal bowel sounds, no masses or organomegaly  Extremities: no cyanosis, clubbing or edema  Musculoskeletal: normal range of motion, no joint swelling, deformity or tenderness  Integumentary: No rashes, no abnormal skin lesions, no petechiae  Neurologic: reflexes normal and symmetric, no cranial nerve deficit  Psych:  Orientation, sensorium, mood irritable           Lines: IV  Left groin post op dressing  + KALYN in  place++ good pedal pulses +      Data Review:    CBC:   Lab Results   Component Value Date    WBC 9.6 11/11/2022    HGB 8.4 (L) 11/11/2022    HCT 25.4 (L) 11/11/2022    MCV 85.3 11/11/2022     11/11/2022     RENAL:   Lab Results   Component Value Date    CREATININE 0.7 11/11/2022    BUN 13 11/11/2022     (L) 11/11/2022    K 4.8 11/11/2022    CL 93 (L) 11/11/2022    CO2 24 11/11/2022     SED RATE:   Lab Results   Component Value Date/Time    SEDRATE 57 11/05/2022 04:03 AM     CK: No results found for: CKTOTAL  CRP:   Lab Results   Component Value Date/Time    CRP 8.8 11/05/2022 04:03 AM     Hepatic Function Panel:   Lab Results   Component Value Date/Time    ALKPHOS 113 11/04/2022 01:16 AM    ALT 13 11/04/2022 01:16 AM    AST 21 11/04/2022 01:16 AM    PROT 9.0 11/04/2022 01:16 AM    BILITOT <0.2 11/04/2022 01:16 AM    BILIDIR 0.3 11/16/2019 12:58 AM    IBILI 0.5 11/16/2019 12:58 AM    LABALBU 3.3 11/11/2022 04:35 AM     UA:  Lab Results   Component Value Date/Time    COLORU Yellow 01/01/2019 09:39 PM    CLARITYU Clear 01/01/2019 09:39 PM    GLUCOSEU Negative 01/01/2019 09:39 PM    BILIRUBINUR Negative 01/01/2019 09:39 PM    KETUA 15 01/01/2019 09:39 PM    SPECGRAV >=1.030 01/01/2019 09:39 PM    BLOODU Negative 01/01/2019 09:39 PM    PHUR 6.5 11/05/2022 12:09 PM    PROTEINU Negative 01/01/2019 09:39 PM    UROBILINOGEN 0.2 01/01/2019 09:39 PM    NITRU Negative 01/01/2019 09:39 PM    LEUKOCYTESUR Negative 01/01/2019 09:39 PM    LABMICR Not Indicated 01/01/2019 09:39 PM    URINETYPE Not Specified 01/01/2019 09:39 PM      Urine Microscopic: No results found for: LABCAST, BACTERIA, COMU, HYALCAST, WBCUA, RBCUA, EPIU  Urine Reflex to Culture:   Lab Results   Component Value Date/Time    URRFLXCULT Not Indicated 01/01/2019 09:39 PM         Ref Range & Units 11/04/22 1020   Amphetamine Screen, Urine Negative <1000ng/mL POSITIVE Abnormal     Comment: High concentrations of ephedrine/pseudoephedrine or   phenylpropanolamine may cause false positive results   for amphetamine. Therefore, confirmatory testing for   amphetamine should be considered if clinically indicated. Barbiturate Screen, Ur Negative <200 ng/mL Neg    Benzodiazepine Screen, Urine Negative <200 ng/mL Neg    Cannabinoid Scrn, Ur Negative <50 ng/mL Neg    Cocaine Metabolite Screen, Urine Negative <300 ng/mL POSITIVE Abnormal       MICRO: cultures reviewed and updated by me   Blood Culture:   Lab Results   Component Value Date/Time    BC No Growth after 4 days of incubation. 11/04/2022 02:50 AM       Respiratory Culture:  Lab Results   Component Value Date/Time    LABGRAM 1+ WBC's (Polymorphonuclear)  No organisms seen   11/08/2022 09:34 AM     AFB:No results found for: AFBSMEAR  Viral Culture:  No results found for: COVID19  Urine Culture: No results for input(s): Ann Marie Arias in the last 72 hours. IMAGING:    CTA LOWER EXTREMITY LEFT W CONTRAST   Final Result   1. Large pseudoaneurysm of the distal left femoral artery likely correlating   to history of IV drug abuse. 2. There is interval thickening and inflammation along the femoral artery and   the proximal superficial femoral artery suggesting vasculitis in association   with above. 3. Cellulitis of the left groin with multiple reactive enlarged lymph nodes.            Component Ref Range & Units 11/05/22 1255   HIV Ag/Ab Non-reactive Non-Reactive    HIV-1 Antibody Non-reactive Non-Reactive    HIV ANTIGEN Non-reactive Non-Reactive    HIV-2 Ab Non-reactive Non-Reactive    Resulting Agency  [facility]        Specimen Collected: 11/05/22 1255 Last Resulted: 11/05/22 1446 View Other Order Details          All the pertinent images and reports for the current Hospitalization were reviewed by me     Scheduled Meds:   polyethylene glycol  17 g Oral Daily    sodium chloride flush  5-40 mL IntraVENous 2 times per day    enoxaparin  30 mg SubCUTAneous Daily    aspirin  81 mg Oral Daily    sodium chloride flush  5-40 mL IntraVENous 2 times per day    vancomycin (VANCOCIN) intermittent dosing (placeholder)   Other RX Placeholder    nicotine  1 patch TransDERmal Daily    lidocaine 1 % injection  5 mL IntraDERmal Once       Continuous Infusions:   sodium chloride      sodium chloride Stopped (11/11/22 0317)       PRN Meds:  bisacodyl, bisacodyl, sodium chloride flush, sodium chloride, calcium carbonate, sodium chloride flush, sodium chloride, acetaminophen **OR** acetaminophen, ondansetron, oxyCODONE **OR** oxyCODONE      Assessment:     Patient Active Problem List   Diagnosis    Breast mass, right    Cellulitis    Pseudoaneurysm (HCC)    Femoral artery pseudo-aneurysm, left (HCC)    IV drug abuse (HCC)    Inguinal adenopathy    Hep C w/o coma, chronic (HCC)    Elevated erythrocyte sedimentation rate    CRP elevated     Left Distal Femoral artery large Pseudo aneurysm+  IVDA on going  H/o Direct Needle injection into Left groin  Cocaine abuse  Amphetamine abuse  Hep C+Ve  Left inguinal adenopathy  BMI at  21   CTA very abnormal involving the Left distal femoral artery Pseudo aneurysm         Unfortunately major complication from direct needle injury to Leti femoral artery and IVDA has been using Cocaine and Amphetamine - seen by vascular team concerned about rupture and bleed - will need IV abx given the presentation concern for local infection she has no fever or reactive WBC so no direct bacterial invasion      Blood cx -ve and HIV screen -ve    S/p Left Femoral artery Pseudoaneurysm surgery and drain in place OR cx negative thus far negative -    Left groin dressing intact and JOLEEN in place vascular notes reviewed      OR cx negative and will be able to choose oral abx for d/c     Labs, Microbiology, Radiology and all the pertinent results from current hospitalization and  care every where were reviewed  by me as a part of the evaluation   Plan:   Cont IV Vancomycin  x 750 mg q 12 HRS  IV Cefepime x 2 gm q 12 HR  Blood cx NGTD  ESR 57 , CRP 8.8   HIV screen  -ve  Vascular surgery following   Watch for Withdrawal   S/p  surgery notes reviewed  and OR cx process NGTD    we can choose oral abx when ready  as the cx remain negative  Can choose  oral Bactrim 1DS X twice a day for x 14 days  to cover skin porfirio and MSSA, gram -ve would avoid Quinolones    D/C plan Per Primary     Discussed with patient/Family and Nursing   Risk of Complications/Morbidity: High      Illness(es)/ Infection present that pose threat to bodily function. There is potential for severe exacerbation of infection/side effects of treatment. Therapy requires intensive monitoring for antimicrobial agent toxicity. Discussed with patient/Family and Nursing staff     Thanks for allowing me to participate in your patient's care and please call me with any questions or concerns.     Manny Monteiro MD  Infectious Disease  [facility]) Physician  Phone: [phone]   Fax : [phone]

## 2022-11-11 NOTE — PROGRESS NOTES
Nutrition Assessment     Type and Reason for Visit: Initial (LOS)    Nutrition Recommendations/Plan:   Continue Regular diet. Malnutrition Assessment:  Malnutrition Status: No malnutrition    Nutrition Assessment:  LOS. Pt with PMH of IVD abuse who presented with groin pain and cellulitis. Pt with pseudoaneurysm s/p femoral artery replacement. Noted pt with good PO intakes since admission, >75% on regular diet. No nutrition interventions needed at this time. Nutrition Related Findings:   BM 11/3; Na 129 Wound Type: None    Current Nutrition Therapies:    ADULT DIET;  Regular    Anthropometric Measures:  Height: 5' 2\" (157.5 cm)  Current Body Wt: 113 lb (51.3 kg)   BMI: 20.7    Nutrition Diagnosis:   No nutrition diagnosis at this time     Nutrition Interventions:   Food and/or Nutrient Delivery: Continue Current Diet  Nutrition Education/Counseling: No recommendation at this time  Coordination of Nutrition Care: Continue to monitor while inpatient     Discharge Planning:    No discharge needs at this time     Benton Mcmanus, RD, LD  Contact: [phone]

## 2022-11-11 NOTE — PROGRESS NOTES
4 Eyes Skin Assessment     NAME:  Roxane Tay  YOB: 1970  MEDICAL RECORD NUMBER:  [id number]    The patient is being assess for  Shift Handoff    I agree that 2 RN's have performed a thorough Head to Toe Skin Assessment on the patient. ALL assessment sites listed below have been assessed. Areas assessed by both nurses:    Head, Face, Ears, Shoulders, Back, Chest, Arms, Elbows, Hands, Sacrum. Buttock, Coccyx, Ischium, and Legs. Feet and Heels        Does the Patient have a Wound?  No noted wound(s)       Herb Prevention initiated:  NA   Wound Care Orders initiated:  NA    Pressure Injury (Stage 3,4, Unstageable, DTI, NWPT, and Complex wounds) if present place referral/consult order under [de-identified] NA    New and Established Ostomies if present place consult order under : NA      Nurse 1 eSignature: Electronically signed by Jazmín Guthrie RN on 11/11/22 at 6:16 AM EST    **SHARE this note so that the co-signing nurse is able to place an eSignature**    Nurse 2 eSignature: {Esignature:[id number]}

## 2022-11-11 NOTE — PROGRESS NOTES
[facility]  Progress Note    11/11/2022 9:25 AM    Chief Complaint: Left Groin Pain    Reason for Consult: Left Groin Abscess    POD #3 Left Common Femoral Artery Replacement with Cadaver Graft and Sartorius Muscle Flap with Dr. Diane Garcia    Subjective: Pt seen resting in bed in CVU, KALYN dressing peeled back, JOLEEN drain removed, KALYN dressing replaced and adequate seal achieved    Vital Signs:  Vitals:    11/11/22 0600 11/11/22 0700 11/11/22 0800 11/11/22 0838   BP: 128/88 132/87 125/81    Pulse: 88 89 95    Resp: 18 18 16    Temp:   98.2 °F (36.8 °C)    TempSrc:   Oral    SpO2: 97% 96% 97%    Weight:       Height:    5' 2\" (1.575 m)       I/O:    Intake/Output Summary (Last 24 hours) at 11/11/2022 0925  Last data filed at 11/11/2022 0823  Gross per 24 hour   Intake 1765.3 ml   Output 2955 ml   Net -1189.7 ml         Physical Exam:   General: no apparent distress, alert and oriented, afebrile  Chest/Lungs: non labored breathing no tachypnea, retractions or cyanosis  Abdomen: abdomen soft and non-distended  Extremities: normal ROM, KALYN dressing peeled back, JOLEEN drain removed, KALYN dressing reinforced and adequate seal achieved, Left Leg warm and able to move leg, foot and toes without difficulty  Vascular: extremities warm and well perfused, no signs of cyanosis or ischemia    Pulses:  -L DP: +2/4 palpable    Labs:   Lab Results   Component Value Date/Time     11/11/2022 04:35 AM    K 4.8 11/11/2022 04:35 AM    K 3.7 11/16/2019 12:58 AM    CL 93 11/11/2022 04:35 AM    CO2 24 11/11/2022 04:35 AM    BUN 13 11/11/2022 04:35 AM    CREATININE 0.7 11/11/2022 04:35 AM    GFRAA >60 11/16/2019 12:58 AM    LABGLOM >60 11/11/2022 04:35 AM    GLUCOSE 107 11/11/2022 04:35 AM    PHOS 3.4 11/11/2022 04:35 AM    MG 1.80 11/11/2022 04:35 AM    CALCIUM 9.1 11/11/2022 04:35 AM     Lab Results   Component Value Date/Time    WBC 9.6 11/11/2022 04:35 AM    RBC 2.98 11/11/2022 04:35 AM    HGB 8.4 11/11/2022 04:35 AM    HCT 25.4 11/11/2022 04:35 AM    MCV 85.3 11/11/2022 04:35 AM    RDW 13.9 11/11/2022 04:35 AM     11/11/2022 04:35 AM   No results found for: INR, PROTIME     Scheduled Meds:    polyethylene glycol  17 g Oral Daily    sodium chloride flush  5-40 mL IntraVENous 2 times per day    enoxaparin  30 mg SubCUTAneous Daily    aspirin  81 mg Oral Daily    sodium chloride flush  5-40 mL IntraVENous 2 times per day    vancomycin (VANCOCIN) intermittent dosing (placeholder)   Other RX Placeholder    nicotine  1 patch TransDERmal Daily    lidocaine 1 % injection  5 mL IntraDERmal Once     Continuous Infusions:    sodium chloride      sodium chloride Stopped (11/11/22 0317)       Imaging:   CTA LLE - 11/4/2022  Impression  1. Large pseudoaneurysm of the distal left femoral artery likely correlating to history of IV drug abuse. 2. There is interval thickening and inflammation along the femoral artery and the proximal superficial femoral artery suggesting vasculitis in association with above. 3. Cellulitis of the left groin with multiple reactive enlarged lymph nodes. Assessment:   -Left Distal Femoral Artery Pseudoaneurysm  -POD #3 Left Common Femoral Artery Replacement with Cadaver Graft and Sartorius Muscle Flap with Dr. Nahum Elam  -KALYN dressing peeled back, JOLEEN drain removed, KALYN dressing replaced and reinforce - adequate seal achieved - flashing green  -JOLEEN drain with 25 mL out since yesterday  -Left Foot warm, DP palpable    Plan:  -Okay to gradually begin ambulation  -D/C Nguyen  -Okay to downgrade to PCU  -PT/OT ordered  -JOLEEN Drain Removed  -Antibiotics - per ID  -Stool softener    All pertinent information and plan of care discussed with Dr. Ariel Fernandez. All questions and concerns were addressed with the patient. I have discussed the above stated plan with the patient and the nurse. The patient verbalized understanding and agreed with the plan.     Thank you for allowing to us to participate in the care of Tory Maier      Electronically signed by ASNYA Bell - CNP on 11/11/2022 at 9:25 AM     Pt seen and examined. for DIAGNOSIS OF infected pseudoaneurysm , status post replacement common femoral and superficial femoral artery agree with SANYA Bell's note. Labs reviewed. Vitals   Vitals:    11/11/22 0700 11/11/22 0800 11/11/22 0838 11/11/22 0900   BP: 132/87 125/81  119/80   Pulse: 89 95  95   Resp: 18 16  17   Temp:  98.2 °F (36.8 °C)     TempSrc:  Oral     SpO2: 96% 97%  96%   Weight:       Height:   5' 2\" (1.575 m)        Palpable  dorsalis pedis pulse

## 2022-11-12 LAB
ALBUMIN SERPL-MCNC: 3.3 G/DL (ref 3.4–5)
ANION GAP SERPL CALCULATED.3IONS-SCNC: 14 MMOL/L (ref 3–16)
BASOPHILS ABSOLUTE: 0.1 K/UL (ref 0–0.2)
BASOPHILS RELATIVE PERCENT: 0.6 %
BUN BLDV-MCNC: 15 MG/DL (ref 7–20)
CALCIUM SERPL-MCNC: 9.1 MG/DL (ref 8.3–10.6)
CHLORIDE BLD-SCNC: 98 MMOL/L (ref 99–110)
CO2: 23 MMOL/L (ref 21–32)
CREAT SERPL-MCNC: 0.6 MG/DL (ref 0.6–1.1)
EOSINOPHILS ABSOLUTE: 0.4 K/UL (ref 0–0.6)
EOSINOPHILS RELATIVE PERCENT: 4.4 %
GFR SERPL CREATININE-BSD FRML MDRD: >60 ML/MIN/{1.73_M2}
GLUCOSE BLD-MCNC: 144 MG/DL (ref 70–99)
HCT VFR BLD CALC: 23.8 % (ref 36–48)
HEMOGLOBIN: 7.9 G/DL (ref 12–16)
LYMPHOCYTES ABSOLUTE: 2.4 K/UL (ref 1–5.1)
LYMPHOCYTES RELATIVE PERCENT: 25 %
MAGNESIUM: 2 MG/DL (ref 1.8–2.4)
MCH RBC QN AUTO: 28.8 PG (ref 26–34)
MCHC RBC AUTO-ENTMCNC: 33.4 G/DL (ref 31–36)
MCV RBC AUTO: 86.2 FL (ref 80–100)
MONOCYTES ABSOLUTE: 0.8 K/UL (ref 0–1.3)
MONOCYTES RELATIVE PERCENT: 8.2 %
NEUTROPHILS ABSOLUTE: 5.9 K/UL (ref 1.7–7.7)
NEUTROPHILS RELATIVE PERCENT: 61.8 %
PDW BLD-RTO: 14.1 % (ref 12.4–15.4)
PHOSPHORUS: 3.5 MG/DL (ref 2.5–4.9)
PLATELET # BLD: 451 K/UL (ref 135–450)
PMV BLD AUTO: 7.9 FL (ref 5–10.5)
POTASSIUM SERPL-SCNC: 4.7 MMOL/L (ref 3.5–5.1)
RBC # BLD: 2.76 M/UL (ref 4–5.2)
SODIUM BLD-SCNC: 135 MMOL/L (ref 136–145)
VANCOMYCIN TROUGH: 9 UG/ML (ref 10–20)
WBC # BLD: 9.6 K/UL (ref 4–11)

## 2022-11-12 PROCEDURE — 6360000002 HC RX W HCPCS: Performed by: INTERNAL MEDICINE

## 2022-11-12 PROCEDURE — 6370000000 HC RX 637 (ALT 250 FOR IP): Performed by: STUDENT IN AN ORGANIZED HEALTH CARE EDUCATION/TRAINING PROGRAM

## 2022-11-12 PROCEDURE — 80069 RENAL FUNCTION PANEL: CPT

## 2022-11-12 PROCEDURE — 2140000000 HC CCU INTERMEDIATE R&B

## 2022-11-12 PROCEDURE — 85025 COMPLETE CBC W/AUTO DIFF WBC: CPT

## 2022-11-12 PROCEDURE — 2580000003 HC RX 258: Performed by: STUDENT IN AN ORGANIZED HEALTH CARE EDUCATION/TRAINING PROGRAM

## 2022-11-12 PROCEDURE — 97116 GAIT TRAINING THERAPY: CPT

## 2022-11-12 PROCEDURE — 6360000002 HC RX W HCPCS: Performed by: STUDENT IN AN ORGANIZED HEALTH CARE EDUCATION/TRAINING PROGRAM

## 2022-11-12 PROCEDURE — 97162 PT EVAL MOD COMPLEX 30 MIN: CPT

## 2022-11-12 PROCEDURE — 80202 ASSAY OF VANCOMYCIN: CPT

## 2022-11-12 PROCEDURE — 94760 N-INVAS EAR/PLS OXIMETRY 1: CPT

## 2022-11-12 PROCEDURE — 6370000000 HC RX 637 (ALT 250 FOR IP): Performed by: INTERNAL MEDICINE

## 2022-11-12 PROCEDURE — 2580000003 HC RX 258: Performed by: INTERNAL MEDICINE

## 2022-11-12 PROCEDURE — 83735 ASSAY OF MAGNESIUM: CPT

## 2022-11-12 RX ORDER — MULTIVITAMIN WITH IRON
1 TABLET ORAL DAILY
Status: DISCONTINUED | OUTPATIENT
Start: 2022-11-12 | End: 2022-11-14 | Stop reason: HOSPADM

## 2022-11-12 RX ADMIN — SODIUM CHLORIDE, PRESERVATIVE FREE 10 ML: 5 INJECTION INTRAVENOUS at 09:43

## 2022-11-12 RX ADMIN — THERA TABS 1 TABLET: TAB at 09:45

## 2022-11-12 RX ADMIN — POLYETHYLENE GLYCOL 3350 17 G: 17 POWDER, FOR SOLUTION ORAL at 09:42

## 2022-11-12 RX ADMIN — ENOXAPARIN SODIUM 30 MG: 100 INJECTION SUBCUTANEOUS at 09:42

## 2022-11-12 RX ADMIN — VANCOMYCIN HYDROCHLORIDE 750 MG: 750 INJECTION, POWDER, LYOPHILIZED, FOR SOLUTION INTRAVENOUS at 13:39

## 2022-11-12 RX ADMIN — OXYCODONE HYDROCHLORIDE 10 MG: 10 TABLET ORAL at 13:32

## 2022-11-12 RX ADMIN — CEFEPIME 2000 MG: 2 INJECTION, POWDER, FOR SOLUTION INTRAVENOUS at 14:43

## 2022-11-12 RX ADMIN — OXYCODONE HYDROCHLORIDE 10 MG: 10 TABLET ORAL at 20:21

## 2022-11-12 RX ADMIN — VANCOMYCIN HYDROCHLORIDE 750 MG: 750 INJECTION, POWDER, LYOPHILIZED, FOR SOLUTION INTRAVENOUS at 00:04

## 2022-11-12 RX ADMIN — CEFEPIME 2000 MG: 2 INJECTION, POWDER, FOR SOLUTION INTRAVENOUS at 03:36

## 2022-11-12 RX ADMIN — OXYCODONE HYDROCHLORIDE 10 MG: 10 TABLET ORAL at 07:25

## 2022-11-12 RX ADMIN — ASPIRIN 81 MG 81 MG: 81 TABLET ORAL at 09:42

## 2022-11-12 RX ADMIN — SODIUM CHLORIDE: 9 INJECTION, SOLUTION INTRAVENOUS at 13:37

## 2022-11-12 ASSESSMENT — PAIN DESCRIPTION - ORIENTATION
ORIENTATION: LEFT

## 2022-11-12 ASSESSMENT — PAIN SCALES - GENERAL
PAINLEVEL_OUTOF10: 7
PAINLEVEL_OUTOF10: 4
PAINLEVEL_OUTOF10: 0
PAINLEVEL_OUTOF10: 4
PAINLEVEL_OUTOF10: 7
PAINLEVEL_OUTOF10: 0
PAINLEVEL_OUTOF10: 8
PAINLEVEL_OUTOF10: 5
PAINLEVEL_OUTOF10: 0
PAINLEVEL_OUTOF10: 7

## 2022-11-12 ASSESSMENT — PAIN DESCRIPTION - DESCRIPTORS
DESCRIPTORS: ACHING
DESCRIPTORS: ACHING
DESCRIPTORS: DULL
DESCRIPTORS: ACHING
DESCRIPTORS: THROBBING

## 2022-11-12 ASSESSMENT — PAIN - FUNCTIONAL ASSESSMENT: PAIN_FUNCTIONAL_ASSESSMENT: ACTIVITIES ARE NOT PREVENTED

## 2022-11-12 ASSESSMENT — PAIN DESCRIPTION - LOCATION
LOCATION: GROIN
LOCATION: LEG
LOCATION: GROIN

## 2022-11-12 NOTE — PROGRESS NOTES
Vascular    POD #4  Doing well today  Pain controlled    AFVSS  Left groin KALYN dressing intact. Good seal  Surrounding tissue soft  Left warm with 1+ left DP pulse  Motor and sensation intact    Lab Results   Component Value Date    WBC 9.6 11/12/2022    HGB 7.9 (L) 11/12/2022    HCT 23.8 (L) 11/12/2022    MCV 86.2 11/12/2022     (H) 11/12/2022     I/P:  POD #4  Continue KALYN to left groin  ABX per ID  Good distal perfusion  Anticipate d/c early this week      Dar Mckenna M.D., FACS.  11/12/2022  11:52 AM

## 2022-11-12 NOTE — PROGRESS NOTES
4 Eyes Skin Assessment     NAME:  Mikhail Benavides  YOB: 1970  MEDICAL RECORD NUMBER:  [id number]    The patient is being assess for  {Reason for Assessment:00051}    I agree that 2 RN's have performed a thorough Head to Toe Skin Assessment on the patient. ALL assessment sites listed below have been assessed. Areas assessed by both nurses:    Head, Face, Ears, Shoulders, Back, Chest, Arms, Elbows, Hands, Sacrum. Buttock, Coccyx, Ischium, and Legs. Feet and Heels        Does the Patient have a Wound?  No noted wound(s)       Herb Prevention initiated:  NA   Wound Care Orders initiated:  NA    Pressure Injury (Stage 3,4, Unstageable, DTI, NWPT, and Complex wounds) if present place referral/consult order under [de-identified] NA    New and Established Ostomies if present place consult order under : NA      Nurse 1 eSignature: Electronically signed by Michel Allan RN on 11/12/22 at 6:20 AM EST    **SHARE this note so that the co-signing nurse is able to place an eSignature**    Nurse 2 eSignature: {Esignature:[id number]}

## 2022-11-12 NOTE — PROGRESS NOTES
Hospitalist Progress Note      PCP: Juan Shook MD    Date of Admission: 11/4/2022      Hospital Course: Patient with history of IV drug use admitted with left groin pain and swelling, found to have a large infected pseudoaneurysm due to IV drug use, underwent left common femoral artery replacement using a cadaveric vein graft with sartorius muscle flap on 11/8/2022. Subjective: Pt seen and examined sitting up in chair, reports feeling quite bored being in the hospital currently. Discussed with vascular surgery, kathrine drain still in left groin, will need to come out prior to discharge. Discussed with patient.             Medications:  Reviewed    Infusion Medications    sodium chloride      sodium chloride Stopped (11/11/22 0317)     Scheduled Medications    multivitamin  1 tablet Oral Daily    cefepime  2,000 mg IntraVENous Q12H    vancomycin  750 mg IntraVENous Q12H    polyethylene glycol  17 g Oral Daily    sodium chloride flush  5-40 mL IntraVENous 2 times per day    enoxaparin  30 mg SubCUTAneous Daily    aspirin  81 mg Oral Daily    sodium chloride flush  5-40 mL IntraVENous 2 times per day    vancomycin (VANCOCIN) intermittent dosing (placeholder)   Other RX Placeholder    nicotine  1 patch TransDERmal Daily    lidocaine 1 % injection  5 mL IntraDERmal Once     PRN Meds: bisacodyl, bisacodyl, sodium chloride flush, sodium chloride, calcium carbonate, sodium chloride flush, sodium chloride, acetaminophen **OR** acetaminophen, ondansetron, oxyCODONE **OR** oxyCODONE      Intake/Output Summary (Last 24 hours) at 11/12/2022 1123  Last data filed at 11/12/2022 0900  Gross per 24 hour   Intake 1200 ml   Output 1500 ml   Net -300 ml         Physical Exam Performed:    BP (!) 144/121   Pulse (!) 114   Temp 98.1 °F (36.7 °C) (Oral)   Resp 17   Ht 5' 2\" (1.575 m)   Wt 109 lb 9.1 oz (49.7 kg)   SpO2 97%   BMI 20.04 kg/m²     On exam    Alert and oriented x3  Anicteric  CVS: S1 and S2, regular rate and rhythm  Lungs clear to auscultation bilaterally  Abdomen soft and nontender  No edema  Ric drain in left groin           Labs:   Recent Labs     11/10/22  0400 11/11/22  0435 11/12/22  0410   WBC 9.7 9.6 9.6   HGB 8.2* 8.4* 7.9*   HCT 24.3* 25.4* 23.8*    424 451*       Recent Labs     11/10/22  0400 11/11/22  0435 11/12/22  0410   * 129* 135*   K 5.0 4.8 4.7   CL 96* 93* 98*   CO2 25 24 23   BUN 15 13 15   CREATININE 0.7 0.7 0.6   CALCIUM 8.8 9.1 9.1   PHOS 2.7 3.4 3.5       No results for input(s): AST, ALT, BILIDIR, BILITOT, ALKPHOS in the last 72 hours. No results for input(s): INR in the last 72 hours. No results for input(s): [place] Boone in the last 72 hours. Urinalysis:      Lab Results   Component Value Date/Time    NITRU Negative 01/01/2019 09:39 PM    BLOODU Negative 01/01/2019 09:39 PM    SPECGRAV >=1.030 01/01/2019 09:39 PM    GLUCOSEU Negative 01/01/2019 09:39 PM       Radiology:  CTA LOWER EXTREMITY LEFT W CONTRAST   Final Result   1. Large pseudoaneurysm of the distal left femoral artery likely correlating   to history of IV drug abuse. 2. There is interval thickening and inflammation along the femoral artery and   the proximal superficial femoral artery suggesting vasculitis in association   with above. 3. Cellulitis of the left groin with multiple reactive enlarged lymph nodes.                  Assessment/Plan:    Active Hospital Problems    Diagnosis     IV drug abuse ([facility] [address] [F19.10]      Priority: Medium    Inguinal adenopathy [R59.0]      Priority: Medium    Hep C w/o coma, chronic (HCC) [B18.2]      Priority: Medium    Elevated erythrocyte sedimentation rate [R70.0]      Priority: Medium    CRP elevated [R79.82]      Priority: Medium    Cellulitis [L03.90]      Priority: Medium    Pseudoaneurysm (HCC) [I72.9]      Priority: Medium    Femoral artery pseudo-aneurysm, left (HCC) [I72.4]      Priority: Medium     Left groin large pseudoaneurysm due to IV drug use status post repair by vascular team on 11/8/2022  Continue IV antibiotics while in-house and transition to Bactrim for 14 days per ID recommendations at discharge  Anemia postoperatively: Monitor  Hyponatremia slowly resolving  Continue bowel regimen  Activity as tolerated  Monitor I's and O's    DVT Prophylaxis: Lovenox  Diet: ADULT DIET;  Regular  Code Status: Full Code  PT/OT     Dispo -possible discharge home in 2 to 3 days pending clearance from vascular surgery team.              Seng Meneses MD

## 2022-11-12 NOTE — PROGRESS NOTES
Clinical Pharmacy Note  Vancomycin Consult    Naima Naidu is a 47 y.o. female ordered Vancomycin for SSTI; consult received from Dr. Lenora Jacobs to manage therapy. Also receiving cefepime. Allergies:  Pcn [penicillins]     Temp max:  Temp (24hrs), Av.2 °F (36.8 °C), Min:97.8 °F (36.6 °C), Max:98.6 °F (37 °C)      Recent Labs     11/10/22  0400 22  0435 22  0410   WBC 9.7 9.6 9.6       Recent Labs     11/10/22  0400 22  0435 22  0410   BUN 15 13 15   CREATININE 0.7 0.7 0.6         Intake/Output Summary (Last 24 hours) at 2022 1347  Last data filed at 2022 1300  Gross per 24 hour   Intake 1200 ml   Output 1500 ml   Net -300 ml       Culture Results:  NGTD    Ht Readings from Last 1 Encounters:   22 5' 2\" (1.575 m)        Wt Readings from Last 1 Encounters:   22 109 lb 9.1 oz (49.7 kg)         Estimated Creatinine Clearance: 87 mL/min (based on SCr of 0.6 mg/dL). Assessment:  Day # 9 of vancomycin. Current regimen: 750 mg every 12 hours  Vancomycin level: 9 mg/L  Predicted AUC: 385 and trough of 10.4 mg/L    Plan:  Continue current regimen. Thank you for the consult.

## 2022-11-12 NOTE — PLAN OF CARE
Problem: Discharge Planning  Goal: Discharge to home or other facility with appropriate resources  Outcome: Progressing  Flowsheets (Taken 11/11/2022 2030)  Discharge to home or other facility with appropriate resources:   Identify barriers to discharge with patient and caregiver   Identify discharge learning needs (meds, wound care, etc)     Problem: Safety - Adult  Goal: Free from fall injury  Outcome: Progressing     Problem: Pain  Goal: Verbalizes/displays adequate comfort level or baseline comfort level  Outcome: Progressing     Problem: Respiratory - Adult  Goal: Achieves optimal ventilation and oxygenation  Outcome: Progressing  Flowsheets (Taken 11/11/2022 2030)  Achieves optimal ventilation and oxygenation:   Assess for changes in respiratory status   Assess for changes in mentation and behavior   Encourage broncho-pulmonary hygiene including cough, deep breathe, incentive spirometry     Problem: Cardiovascular - Adult  Goal: Maintains optimal cardiac output and hemodynamic stability  Outcome: Progressing     Problem: Infection - Adult  Goal: Absence of infection at discharge  Outcome: Progressing  Flowsheets (Taken 11/11/2022 2030)  Absence of infection at discharge:   Assess and monitor for signs and symptoms of infection   Monitor all insertion sites i.e., indwelling lines, tubes and drains   Administer medications as ordered   Instruct and encourage patient and family to use good hand hygiene technique   Monitor lab/diagnostic results     Problem: Hematologic - Adult  Goal: Maintains hematologic stability  Outcome: Progressing  Flowsheets (Taken 11/11/2022 2030)  Maintains hematologic stability:   Assess for signs and symptoms of bleeding or hemorrhage   Monitor labs for bleeding or clotting disorders     Problem: Skin/Tissue Integrity  Goal: Absence of new skin breakdown  Description: 1. Monitor for areas of redness and/or skin breakdown  2. Assess vascular access sites hourly  3.   Every 4-6 hours minimum:  Change oxygen saturation probe site  4. Every 4-6 hours:  If on nasal continuous positive airway pressure, respiratory therapy assess nares and determine need for appliance change or resting period.   Outcome: Progressing

## 2022-11-12 NOTE — PROGRESS NOTES
Physical Therapy  Facility/Department: [address] CVICU  Physical Therapy Initial Assessment and  DISCHARGE SUMMARY     Name: Ani Renee  : 1970  MRN: [id number]  Date of Service: 2022    Discharge Recommendations:  24 hour supervision or assist   PT Equipment Recommendations  Equipment Needed: No      Patient Diagnosis(es): The encounter diagnosis was Cellulitis, unspecified cellulitis site. Past Medical History:  has a past medical history of Hepatitis C without hepatic coma, Hx of blood clots, IV drug abuse ([facility] [address] and Methadone use. Past Surgical History:  has a past surgical history that includes Tubal ligation;  section; Breast biopsy (Right, 2015); and femoral bypass (Left, 2022). Assessment   Body Structures, Functions, Activity Limitations Requiring Skilled Therapeutic Intervention: Decreased functional mobility   Assessment: Ani Renee is a 47 y.o. female who presents to the emergency department complaining of swelling in her left groin that has been present for about a week. She tells me that she has been using this area to inject drugs. She tells me she injects cocaine IV and last used earlier today on her right leg. She has pain in the area that is 10 out of 10 she does not feel like it is getting worse but its not gotten better since she was seen in the emergency department a couple of days ago and left against medical advice prior to treatment. Diagnosed with Cellulitis and underwent \"LEFT FEMORAL ARTERY REPLACEMENT, SARTORIUS FLAP\"  Prior to admit, pt reported living with her 24 yo son, fully Independent, but transitioning jobs. Today, pt demo's Supervised-Independent bed mobility, transfers, and was able to ambulate without a device > 150 ft without difficulty. No further acute PT needs identified at this time. Anticipate return to Home once medically stable by MD.  Pt agrees with PT poc. Ani Renee scored a 23/24 on the AM-PAC short mobility form. At this time, no further PT is recommended at this time. Recommend patient returns to prior setting with prior services. Therapy Prognosis: Good;Excellent  Decision Making: Medium Complexity  History: as noted  Exam: as above  Clinical Presentation: evolving  Barriers to Learning: none  Requires PT Follow-Up: No  Activity Tolerance  Activity Tolerance: Patient tolerated evaluation without incident;Patient tolerated treatment well     Plan   Physcial Therapy Plan  General Plan: Discharge with evaluation only  Safety Devices  Type of Devices: Left in chair, Nurse notified, Gait belt, Call light within reach     Restrictions  Position Activity Restriction  Other position/activity restrictions: \"up ad peter\", \"ambulate patient\"--per orders. Subjective   General  Chart Reviewed: Yes  Additional Pertinent Hx: Alayna Fajardo is a 47 y.o. female who presents to the emergency department complaining of swelling in her left groin that has been present for about a week. She tells me that she has been using this area to inject drugs. She tells me she injects cocaine IV and last used earlier today on her right leg. She has pain in the area that is 10 out of 10 she does not feel like it is getting worse but its not gotten better since she was seen in the emergency department a couple of days ago and left against medical advice prior to treatment. Diagnosed with Cellulitis and underwent \"LEFT FEMORAL ARTERY REPLACEMENT, SARTORIUS FLAP\"  Family / Caregiver Present: No  Referring Practitioner: Miryam Vázquez Commands: Within Functional Limits  Subjective  Subjective: Pt in supine, denied overt c/o pain. L thigh dressing intact, noted minimal swelling of L thigh. Pt agreeable to PT session--reported up to/from bathroom yesterday with nursing.       Social/Functional History  Social/Functional History  Lives With: Son  Type of Home: House  Home Layout: Two level  Entrance Stairs - Number of Steps: 2 alejandra  Bathroom Shower/Tub: Tub/Shower unit  Bathroom Toilet: Standard  ADL Assistance: Independent  Homemaking Assistance: Independent  Ambulation Assistance: Independent  Transfer Assistance: Independent  Active : Yes  Type of Occupation: reported \"transitioining job\" to [employer], reported flexibility with sitting/standing activity at her job. Vision/Hearing  Vision  Vision: Within Functional Limits  Vision Exceptions: Wears glasses for reading  Hearing  Hearing: Within functional limits    Cognition   Orientation  Overall Orientation Status: Within Functional Limits     Objective   AROM RLE (degrees)  RLE AROM: WFL  AROM LLE (degrees)  LLE AROM : WFL  Strength RLE  Strength RLE: WFL  Strength LLE  Strength LLE: WFL  Comment: not mmt directly, but functional for mobility. Bed mobility  Supine to Sit: Supervision; Independent  Sit to Supine: Unable to assess (pt up in recliner at end of session.)  Scooting: Supervision  Transfers  Sit to Stand: Supervision  Stand to Sit: Supervision  Ambulation  Surface: Level tile  Device: No Device  Assistance: Supervision;Stand by assistance  Quality of Gait: steady gait without overt LOB or deviations noted. Gait Deviations: Slow Rita  Distance: x 30-40 ft in room, to/from bathroom, in addition to > 150 ft into hallways, turns and returns back to room. Comments: Pt education provided regarding watch for swelling and / or to elevate left LE (foot/ankle) if needed based on appearance. Pt VU of education provided. More Ambulation?: Yes  Stairs/Curb  Stairs?: No     Balance  Sitting - Static: Good  Sitting - Dynamic: Good  Standing - Static: Good  Standing - Dynamic: Good  Comments: S/I sitting, stance, ambulation without device.      AM-PAC Score  AM-PAC Inpatient Mobility Raw Score : 23 (11/12/22 0905)  AM-PAC Inpatient T-Scale Score : 56.93 (11/12/22 0905)  Mobility Inpatient CMS 0-100% Score: 11.2 (11/12/22 0905)  Mobility Inpatient CMS G-Code Modifier : CI (11/12/22 3832)     Goals: No acute PT goals set, as pt demo'd functional mobility and ambulation without need for equipment at this time. Patient Goals   Patient Goals : Home possibly today/tomorrow? ?     Education  Patient Education  Education Given To: Patient  Education Provided: Role of Therapy;Plan of Care  Education Method: Demonstration;Verbal  Barriers to Learning: None  Education Outcome: Verbalized understanding  Therapy Time   Individual Concurrent Group Co-treatment   Time In 0825         Time Out 0905         Minutes 40          1 eval, 2 gt charges   Laya Rivas PT Electronically signed by Laya Rivas PT on 11/12/2022 at 9:07 AM

## 2022-11-13 LAB
ALBUMIN SERPL-MCNC: 3.2 G/DL (ref 3.4–5)
ANAEROBIC CULTURE: NORMAL
ANION GAP SERPL CALCULATED.3IONS-SCNC: 6 MMOL/L (ref 3–16)
BASOPHILS ABSOLUTE: 0.1 K/UL (ref 0–0.2)
BASOPHILS RELATIVE PERCENT: 0.8 %
BUN BLDV-MCNC: 17 MG/DL (ref 7–20)
CALCIUM SERPL-MCNC: 8.8 MG/DL (ref 8.3–10.6)
CHLORIDE BLD-SCNC: 96 MMOL/L (ref 99–110)
CO2: 26 MMOL/L (ref 21–32)
CREAT SERPL-MCNC: 0.7 MG/DL (ref 0.6–1.1)
EOSINOPHILS ABSOLUTE: 0.6 K/UL (ref 0–0.6)
EOSINOPHILS RELATIVE PERCENT: 6.3 %
FERRITIN: 76.7 NG/ML (ref 15–150)
GFR SERPL CREATININE-BSD FRML MDRD: >60 ML/MIN/{1.73_M2}
GLUCOSE BLD-MCNC: 103 MG/DL (ref 70–99)
GRAM STAIN RESULT: NORMAL
HCT VFR BLD CALC: 22.8 % (ref 36–48)
HCT VFR BLD CALC: 24.7 % (ref 36–48)
HEMOGLOBIN: 7.6 G/DL (ref 12–16)
IMMATURE RETIC FRACT: 0.57 (ref 0.21–0.37)
IRON SATURATION: 8 % (ref 15–50)
IRON: 21 UG/DL (ref 37–145)
LYMPHOCYTES ABSOLUTE: 2.5 K/UL (ref 1–5.1)
LYMPHOCYTES RELATIVE PERCENT: 27.3 %
MAGNESIUM: 2 MG/DL (ref 1.8–2.4)
MCH RBC QN AUTO: 28.6 PG (ref 26–34)
MCHC RBC AUTO-ENTMCNC: 33.3 G/DL (ref 31–36)
MCV RBC AUTO: 85.8 FL (ref 80–100)
MONOCYTES ABSOLUTE: 0.7 K/UL (ref 0–1.3)
MONOCYTES RELATIVE PERCENT: 7.6 %
NEUTROPHILS ABSOLUTE: 5.2 K/UL (ref 1.7–7.7)
NEUTROPHILS RELATIVE PERCENT: 58 %
OSMOLALITY URINE: 324 MOSM/KG (ref 390–1070)
PDW BLD-RTO: 13.7 % (ref 12.4–15.4)
PHOSPHORUS: 3.8 MG/DL (ref 2.5–4.9)
PLATELET # BLD: 488 K/UL (ref 135–450)
PMV BLD AUTO: 7.5 FL (ref 5–10.5)
POTASSIUM SERPL-SCNC: 4.2 MMOL/L (ref 3.5–5.1)
RBC # BLD: 2.66 M/UL (ref 4–5.2)
RETICULOCYTE ABSOLUTE COUNT: 0.1 M/UL (ref 0.02–0.1)
RETICULOCYTE COUNT PCT: 3.45 % (ref 0.5–2.18)
SODIUM BLD-SCNC: 128 MMOL/L (ref 136–145)
SODIUM URINE: 55 MMOL/L
TOTAL IRON BINDING CAPACITY: 248 UG/DL (ref 260–445)
WBC # BLD: 9 K/UL (ref 4–11)
WOUND/ABSCESS: NORMAL

## 2022-11-13 PROCEDURE — 83935 ASSAY OF URINE OSMOLALITY: CPT

## 2022-11-13 PROCEDURE — 6370000000 HC RX 637 (ALT 250 FOR IP): Performed by: STUDENT IN AN ORGANIZED HEALTH CARE EDUCATION/TRAINING PROGRAM

## 2022-11-13 PROCEDURE — 84300 ASSAY OF URINE SODIUM: CPT

## 2022-11-13 PROCEDURE — 6360000002 HC RX W HCPCS: Performed by: INTERNAL MEDICINE

## 2022-11-13 PROCEDURE — 6370000000 HC RX 637 (ALT 250 FOR IP): Performed by: INTERNAL MEDICINE

## 2022-11-13 PROCEDURE — 83735 ASSAY OF MAGNESIUM: CPT

## 2022-11-13 PROCEDURE — 6360000002 HC RX W HCPCS: Performed by: STUDENT IN AN ORGANIZED HEALTH CARE EDUCATION/TRAINING PROGRAM

## 2022-11-13 PROCEDURE — 80069 RENAL FUNCTION PANEL: CPT

## 2022-11-13 PROCEDURE — 2580000003 HC RX 258: Performed by: STUDENT IN AN ORGANIZED HEALTH CARE EDUCATION/TRAINING PROGRAM

## 2022-11-13 PROCEDURE — 85045 AUTOMATED RETICULOCYTE COUNT: CPT

## 2022-11-13 PROCEDURE — 1200000000 HC SEMI PRIVATE

## 2022-11-13 PROCEDURE — 83550 IRON BINDING TEST: CPT

## 2022-11-13 PROCEDURE — 82728 ASSAY OF FERRITIN: CPT

## 2022-11-13 PROCEDURE — 2580000003 HC RX 258: Performed by: INTERNAL MEDICINE

## 2022-11-13 PROCEDURE — 83540 ASSAY OF IRON: CPT

## 2022-11-13 PROCEDURE — 85025 COMPLETE CBC W/AUTO DIFF WBC: CPT

## 2022-11-13 PROCEDURE — 2060000000 HC ICU INTERMEDIATE R&B

## 2022-11-13 PROCEDURE — 94760 N-INVAS EAR/PLS OXIMETRY 1: CPT

## 2022-11-13 RX ORDER — SODIUM CHLORIDE 1000 MG
1 TABLET, SOLUBLE MISCELLANEOUS
Status: DISCONTINUED | OUTPATIENT
Start: 2022-11-13 | End: 2022-11-14 | Stop reason: HOSPADM

## 2022-11-13 RX ORDER — DOCUSATE SODIUM 100 MG/1
100 CAPSULE, LIQUID FILLED ORAL 2 TIMES DAILY
Status: DISCONTINUED | OUTPATIENT
Start: 2022-11-13 | End: 2022-11-14 | Stop reason: HOSPADM

## 2022-11-13 RX ORDER — FERROUS SULFATE TAB EC 324 MG (65 MG FE EQUIVALENT) 324 (65 FE) MG
324 TABLET DELAYED RESPONSE ORAL
Status: DISCONTINUED | OUTPATIENT
Start: 2022-11-13 | End: 2022-11-14 | Stop reason: HOSPADM

## 2022-11-13 RX ADMIN — THERA TABS 1 TABLET: TAB at 09:38

## 2022-11-13 RX ADMIN — OXYCODONE HYDROCHLORIDE 10 MG: 10 TABLET ORAL at 20:42

## 2022-11-13 RX ADMIN — CEFEPIME 2000 MG: 2 INJECTION, POWDER, FOR SOLUTION INTRAVENOUS at 17:35

## 2022-11-13 RX ADMIN — CEFEPIME 2000 MG: 2 INJECTION, POWDER, FOR SOLUTION INTRAVENOUS at 05:19

## 2022-11-13 RX ADMIN — DOCUSATE SODIUM 100 MG: 100 CAPSULE, LIQUID FILLED ORAL at 20:42

## 2022-11-13 RX ADMIN — VANCOMYCIN HYDROCHLORIDE 750 MG: 750 INJECTION, POWDER, LYOPHILIZED, FOR SOLUTION INTRAVENOUS at 00:51

## 2022-11-13 RX ADMIN — VANCOMYCIN HYDROCHLORIDE 750 MG: 750 INJECTION, POWDER, LYOPHILIZED, FOR SOLUTION INTRAVENOUS at 23:58

## 2022-11-13 RX ADMIN — Medication 1 G: at 17:30

## 2022-11-13 RX ADMIN — OXYCODONE HYDROCHLORIDE 10 MG: 10 TABLET ORAL at 11:06

## 2022-11-13 RX ADMIN — FERROUS SULFATE TAB EC 324 MG (65 MG FE EQUIVALENT) 324 MG: 324 (65 FE) TABLET DELAYED RESPONSE at 12:16

## 2022-11-13 RX ADMIN — POLYETHYLENE GLYCOL 3350 17 G: 17 POWDER, FOR SOLUTION ORAL at 09:38

## 2022-11-13 RX ADMIN — Medication 1 G: at 13:17

## 2022-11-13 RX ADMIN — SODIUM CHLORIDE, PRESERVATIVE FREE 10 ML: 5 INJECTION INTRAVENOUS at 20:42

## 2022-11-13 RX ADMIN — ENOXAPARIN SODIUM 30 MG: 100 INJECTION SUBCUTANEOUS at 09:38

## 2022-11-13 RX ADMIN — ASPIRIN 81 MG 81 MG: 81 TABLET ORAL at 09:38

## 2022-11-13 RX ADMIN — VANCOMYCIN HYDROCHLORIDE 750 MG: 750 INJECTION, POWDER, LYOPHILIZED, FOR SOLUTION INTRAVENOUS at 11:58

## 2022-11-13 ASSESSMENT — PAIN DESCRIPTION - ORIENTATION: ORIENTATION: LEFT

## 2022-11-13 ASSESSMENT — PAIN DESCRIPTION - PAIN TYPE: TYPE: SURGICAL PAIN

## 2022-11-13 ASSESSMENT — PAIN SCALES - GENERAL
PAINLEVEL_OUTOF10: 6
PAINLEVEL_OUTOF10: 7
PAINLEVEL_OUTOF10: 5
PAINLEVEL_OUTOF10: 6

## 2022-11-13 ASSESSMENT — PAIN - FUNCTIONAL ASSESSMENT: PAIN_FUNCTIONAL_ASSESSMENT: ACTIVITIES ARE NOT PREVENTED

## 2022-11-13 ASSESSMENT — PAIN DESCRIPTION - ONSET: ONSET: ON-GOING

## 2022-11-13 ASSESSMENT — PAIN DESCRIPTION - DESCRIPTORS: DESCRIPTORS: ACHING;DULL

## 2022-11-13 ASSESSMENT — PAIN DESCRIPTION - LOCATION: LOCATION: GROIN

## 2022-11-13 ASSESSMENT — PAIN DESCRIPTION - FREQUENCY: FREQUENCY: CONTINUOUS

## 2022-11-13 NOTE — PROGRESS NOTES
Hospital Medicine Progress Note     Date:  11/13/2022    PCP: Briana Dean MD (Tel: [phone])    Date of Admission: 11/4/2022    Chief complaint:   Chief Complaint   Patient presents with    Abscess     Started 5 days ago left side of groin       Brief admission history: 56-year-old female with history of chronic hepatitis C, IV drug use, history of thrombosis, chronic cellulitis, who was admitted on November 4, 2022 with complaints of left groin pain, diagnosed with cellulitis and possible abscess of left groin (abscess has now been ruled out), for which she was started on IV antibiotics. Imaging also revealed large pseudoaneurysm of the distal left femoral artery, for which vascular surgery was consulted. On admission, urine drug screen was positive for cocaine and amphetamine. Assessment/plan:  Left groin large pseudoaneurysm, infected. Suspect secondary to IV drug use direct injection into vessel. S/p repair by vascular surgery on November 8, 2022. Patient has been on intravenous antibiotics, with recommendation to transition to oral Bactrim for 14 days at the time of discharge. Acute postoperative anemia. Etiology is multifactorial, including expected perioperative blood loss, frequent blood draw, hemodilution effect from intravenous fluid. Iron studies consistent with BUBBA. Continue supplemental iron. Recommend close monitoring of H&H at discharge, preferably repeat CBC with PCP within 1 week. Hyponatremia. Etiology is multifactorial, including possible SIADH from pain from pain. Sodium has been trending down. Consult nephrology to assist with evaluation. Illicit drug use. Counseled about cessation of drug use. Other comorbidities: history of chronic hepatitis C, IV drug use, history of thrombosis, chronic cellulitis. Diet: ADULT DIET; Regular    Code status: Full Code   ----------  Subjective  No complaints at this time.   Looking forward to going home soon.    Objective  Physical exam:  Vitals: /77   Pulse 82   Temp 98.7 °F (37.1 °C)   Resp 16   Ht 5' 2\" (1.575 m)   Wt 109 lb 9.1 oz (49.7 kg)   SpO2 98%   BMI 20.04 kg/m²   Gen/overall appearance: Not in acute distress. Alert. Oriented x3. Head: Normocephalic, atraumatic  Eyes: EOMI, good acuity  ENT: Oral mucosa moist  Neck: No JVD, thyromegaly  CVS: Nml S1S2, no MRG, RRR  Pulm: Clear bilaterally. No crackles/wheezes  Gastrointestinal: Soft, NT/ND, +BS  Musculoskeletal: No edema. Warm  Neuro: No focal deficit. Moves extremity spontaneously. Psychiatry: Appropriate affect. Not agitated. Skin: Warm, dry with normal turgor. No rash  Capillary refill: Brisk,< 3 seconds   Peripheral Pulses: +2 palpable, equal bilaterally      24HR INTAKE/OUTPUT:    Intake/Output Summary (Last 24 hours) at 11/13/2022 0935  Last data filed at 11/13/2022 0000  Gross per 24 hour   Intake 240 ml   Output 800 ml   Net -560 ml     I/O last 3 completed shifts: In: 960 [P.O.:960]  Out: 2300 [Urine:2300]  No intake/output data recorded.   Meds:    ferrous sulfate  324 mg Oral Daily with breakfast    docusate sodium  100 mg Oral BID    multivitamin  1 tablet Oral Daily    cefepime  2,000 mg IntraVENous Q12H    vancomycin  750 mg IntraVENous Q12H    polyethylene glycol  17 g Oral Daily    sodium chloride flush  5-40 mL IntraVENous 2 times per day    enoxaparin  30 mg SubCUTAneous Daily    aspirin  81 mg Oral Daily    sodium chloride flush  5-40 mL IntraVENous 2 times per day    vancomycin (VANCOCIN) intermittent dosing (placeholder)   Other RX Placeholder    nicotine  1 patch TransDERmal Daily    lidocaine 1 % injection  5 mL IntraDERmal Once     Infusions:    sodium chloride      sodium chloride 5 mL/hr at 11/12/22 1337     PRN Meds: bisacodyl, bisacodyl, sodium chloride flush, sodium chloride, calcium carbonate, sodium chloride flush, sodium chloride, acetaminophen **OR** acetaminophen, ondansetron, oxyCODONE **OR** oxyCODONE    Labs/imaging:  CBC:   Recent Labs     11/11/22  0435 11/12/22  0410 11/13/22  0530   WBC 9.6 9.6 9.0   HGB 8.4* 7.9* 7.6*    451* 488*     BMP:    Recent Labs     11/11/22 0435 11/12/22 0410 11/13/22  0530   * 135* 128*   K 4.8 4.7 4.2   CL 93* 98* 96*   CO2 24 23 26   BUN 13 15 17   CREATININE 0.7 0.6 0.7   GLUCOSE 107* 144* 103*     Hepatic: No results for input(s): AST, ALT, ALB, BILITOT, ALKPHOS in the last 72 hours.     Flavia Chand MD  -------------------------------  Rounding hospitalist

## 2022-11-13 NOTE — PROGRESS NOTES
Report called to [facility] RN. Pt belongings gathered. Pt transported via wheelchair to PCU.

## 2022-11-13 NOTE — PROGRESS NOTES
Pt transferred from CVU. Pt alert and oriented and up independently. KALYN dressing to L groin intact with green flashing light. +2 pedal pulse. Pt states pain is minimum at this time. Pt oriented to room and safety measures. Call light within reach.    Electronically signed by Daniel Wisdom RN on 11/13/22 at 2:28 PM EST

## 2022-11-13 NOTE — CONSULTS
Nephrology Consult Note   [facility]. Breathometer      Chief Complaint: swelling in the left groin    Reason for Consultation: Hyponatremia    History of Present Illness: Ms Marcello Slater is a 46 yo female with a past medical history significant for Hep C, DVT, polysubstance abuse, pre-eclampsia that presented to the ED with swelling in the left groin. Patient has been injecting drugs for a long time she been using all the veins she admits to injecting into the left groin she missed her vein and hit her artery in the past.  She was using opiates in the past and now started using cocaine and Amphetamines - patient admitted to the hospital started on antibiotics and underwent left femoral artery replacement with sartorius flap to address left femoral pseudoaneurysm    Patient stated she hasn't been eating much in the hospital because the food is bland and has been drinking a lot of water    Subjective:    Resting in bed; NAD    ROS: occasional pain at incision site.  Otherwise denies shortness of breath, chest pain, obstructive urinary symptoms - all other ROS negative    Scheduled Meds:   ferrous sulfate  324 mg Oral Daily with breakfast    docusate sodium  100 mg Oral BID    sodium chloride  1 g Oral TID WC    multivitamin  1 tablet Oral Daily    cefepime  2,000 mg IntraVENous Q12H    vancomycin  750 mg IntraVENous Q12H    polyethylene glycol  17 g Oral Daily    sodium chloride flush  5-40 mL IntraVENous 2 times per day    enoxaparin  30 mg SubCUTAneous Daily    aspirin  81 mg Oral Daily    sodium chloride flush  5-40 mL IntraVENous 2 times per day    vancomycin (VANCOCIN) intermittent dosing (placeholder)   Other RX Placeholder    nicotine  1 patch TransDERmal Daily    lidocaine 1 % injection  5 mL IntraDERmal Once        sodium chloride      sodium chloride 5 mL/hr at 11/12/22 1337       PRN Meds:.bisacodyl, bisacodyl, sodium chloride flush, sodium chloride, calcium carbonate, sodium chloride flush, sodium chloride, acetaminophen **OR** acetaminophen, ondansetron, oxyCODONE **OR** oxyCODONE    Physical Exam:    TEMPERATURE:  Current - Temp: 98.6 °F (37 °C); Max - Temp  Av.2 °F (29.6 °C)  Min: 32 °F (0 °C)  Max: 98.7 °F (37.1 °C)  RESPIRATIONS RANGE: Resp  Av.7  Min: 16  Max: 22  PULSE RANGE: Pulse  Av.3  Min: 77  Max: 100  BLOOD PRESSURE RANGE:  Systolic (20MIB), ZWE:227 , Min:111 , BMX:714   ; Diastolic (06CFG), LGZ:81, Min:77, Max:82    24HR INTAKE/OUTPUT:    Intake/Output Summary (Last 24 hours) at 2022 1303  Last data filed at 2022 0000  Gross per 24 hour   Intake --   Output 800 ml   Net -800 ml           Patient Vitals for the past 96 hrs (Last 3 readings):   Weight   22 0523 109 lb 9.1 oz (49.7 kg)   22 0500 113 lb 5.1 oz (51.4 kg)   11/10/22 0600 109 lb 5.6 oz (49.6 kg)       General: Alert, Awake, NAD  HEENT: Normocephalic, atraumatic, Nose and ears appear externally without deformity  Eyes: EOMI, DAVE  Chest: clear to auscultation, no intercostal retractions  CVS: RRR, no murmur, no rub  Abdomen: soft, non tender, no organomegaly, no bruit appreciated  Extremities: no edema, no cyanosis. Skin: normal texture, normal skin turgor, no rash  Musculoskeletal: normal ROM, no joint swelling, no visible deformity. Surgical dressing left groin  Neurological: no focal motor neurological deficit  Psych: O x 3    Past Medical History:   Diagnosis Date    Hepatitis C without hepatic coma     Hx of blood clots     DEEP VEIN THROMBOSIS FROM IV DRUG USE    IV drug abuse ([facility] [address]     None for 5 mos    Methadone use      Past Surgical History:   Procedure Laterality Date    BREAST BIOPSY Right 2015    Excisional     SECTION      FEMORAL BYPASS Left 2022    LEFT FEMORAL ARTERY REPLACEMENT WITH CADAVER VEIN AND SARTORIUS MUSCLE  FLAP performed by Li Kale, DO at [address]       History reviewed. No pertinent family history.     Social History     Socioeconomic History Marital status: Single     Spouse name: Not on file    Number of children: Not on file    Years of education: Not on file    Highest education level: Not on file   Occupational History    Not on file   Tobacco Use    Smoking status: Every Day     Packs/day: 1.00     Years: 20.00     Pack years: 20.00     Types: Cigarettes    Smokeless tobacco: Never   Substance and Sexual Activity    Alcohol use: Yes    Drug use: Yes     Frequency: 1.0 times per week     Types: IV, Cocaine    Sexual activity: Yes     Partners: Male   Other Topics Concern    Not on file   Social History Narrative    Not on file     Social Determinants of Health     Financial Resource Strain: Not on file   Food Insecurity: Not on file   Transportation Needs: Not on file   Physical Activity: Not on file   Stress: Not on file   Social Connections: Not on file   Intimate Partner Violence: Not on file   Housing Stability: Not on file         Allergies:   Allergies   Allergen Reactions    Pcn [Penicillins] Hives          LAB DATA:    CBC:   Lab Results   Component Value Date/Time    WBC 9.0 11/13/2022 05:30 AM    RBC 2.66 11/13/2022 05:30 AM    HGB 7.6 11/13/2022 05:30 AM    HCT 24.7 11/13/2022 11:30 AM    MCV 85.8 11/13/2022 05:30 AM    MCH 28.6 11/13/2022 05:30 AM    MCHC 33.3 11/13/2022 05:30 AM    RDW 13.7 11/13/2022 05:30 AM     11/13/2022 05:30 AM    MPV 7.5 11/13/2022 05:30 AM     BMP:    Lab Results   Component Value Date/Time     11/13/2022 05:30 AM    K 4.2 11/13/2022 05:30 AM    K 3.7 11/16/2019 12:58 AM    CL 96 11/13/2022 05:30 AM    CO2 26 11/13/2022 05:30 AM    BUN 17 11/13/2022 05:30 AM    CREATININE 0.7 11/13/2022 05:30 AM    CALCIUM 8.8 11/13/2022 05:30 AM    GFRAA >60 11/16/2019 12:58 AM    LABGLOM >60 11/13/2022 05:30 AM    GLUCOSE 103 11/13/2022 05:30 AM     Ionized Calcium:  No results found for: IONCA  Magnesium:    Lab Results   Component Value Date/Time    MG 2.00 11/13/2022 05:30 AM     Phosphorus:    Lab Results Component Value Date/Time    PHOS 3.8 11/13/2022 05:30 AM     U/A:    Lab Results   Component Value Date/Time    COLORU Yellow 01/01/2019 09:39 PM    PHUR 6.5 11/05/2022 12:09 PM    CLARITYU Clear 01/01/2019 09:39 PM    SPECGRAV >=1.030 01/01/2019 09:39 PM    LEUKOCYTESUR Negative 01/01/2019 09:39 PM    UROBILINOGEN 0.2 01/01/2019 09:39 PM    BILIRUBINUR Negative 01/01/2019 09:39 PM    BLOODU Negative 01/01/2019 09:39 PM    GLUCOSEU Negative 01/01/2019 09:39 PM         IMPRESSION/RECOMMENDATIONS:      Active Problems:    Cellulitis    Pseudoaneurysm (HCC)    Femoral artery pseudo-aneurysm, left (HCC)    IV drug abuse (HCC)    Inguinal adenopathy    Hep C w/o coma, chronic (HCC)    Elevated erythrocyte sedimentation rate    CRP elevated  Resolved Problems:    * No resolved hospital problems. *    Hyponatremia - Start patient on FR 1.2 L / day  - Awaiting results of Urine osmolality  - U Na 55  - start sodium chloride tablets to increase renal solute load  - if urine osmolality elevated may benefit from Samsca    2. Femoral artery pseudoaneurysm - s/p eft femoral artery replacement with sartorius flap    3.  Hx of substance abuse

## 2022-11-13 NOTE — PROGRESS NOTES
Vascular    No new complaints  Pain stable    AFVSS  Left foot warm with palpable DP pulse  Left groin KALYN in place  Good seal  Surrounding tissue soft    Lab Results   Component Value Date    WBC 9.0 11/13/2022    HGB 7.6 (L) 11/13/2022    HCT 22.8 (L) 11/13/2022    MCV 85.8 11/13/2022     (H) 11/13/2022     I/P:  POD #5  Kalyn to left groin with good seal  ABX per ID  Dispo early this week      Dar Treadwell M.D., FACS.  11/13/2022  8:21 AM

## 2022-11-14 VITALS
DIASTOLIC BLOOD PRESSURE: 88 MMHG | SYSTOLIC BLOOD PRESSURE: 129 MMHG | TEMPERATURE: 97.4 F | WEIGHT: 108.69 LBS | RESPIRATION RATE: 18 BRPM | HEART RATE: 100 BPM | BODY MASS INDEX: 20 KG/M2 | HEIGHT: 62 IN | OXYGEN SATURATION: 98 %

## 2022-11-14 LAB
ALBUMIN SERPL-MCNC: 3.2 G/DL (ref 3.4–5)
ALP BLD-CCNC: 110 U/L (ref 40–129)
ALT SERPL-CCNC: 13 U/L (ref 10–40)
ANION GAP SERPL CALCULATED.3IONS-SCNC: 7 MMOL/L (ref 3–16)
AST SERPL-CCNC: 18 U/L (ref 15–37)
BASOPHILS ABSOLUTE: 0.1 K/UL (ref 0–0.2)
BASOPHILS RELATIVE PERCENT: 0.6 %
BILIRUB SERPL-MCNC: <0.2 MG/DL (ref 0–1)
BILIRUBIN DIRECT: <0.2 MG/DL (ref 0–0.3)
BILIRUBIN, INDIRECT: NORMAL MG/DL (ref 0–1)
BUN BLDV-MCNC: 16 MG/DL (ref 7–20)
CALCIUM SERPL-MCNC: 8.8 MG/DL (ref 8.3–10.6)
CHLORIDE BLD-SCNC: 102 MMOL/L (ref 99–110)
CO2: 25 MMOL/L (ref 21–32)
CREAT SERPL-MCNC: 0.7 MG/DL (ref 0.6–1.1)
EOSINOPHILS ABSOLUTE: 0.6 K/UL (ref 0–0.6)
EOSINOPHILS RELATIVE PERCENT: 6.5 %
GFR SERPL CREATININE-BSD FRML MDRD: >60 ML/MIN/{1.73_M2}
GLUCOSE BLD-MCNC: 101 MG/DL (ref 70–99)
HCT VFR BLD CALC: 21.7 % (ref 36–48)
HEMOGLOBIN: 7.2 G/DL (ref 12–16)
LYMPHOCYTES ABSOLUTE: 2.7 K/UL (ref 1–5.1)
LYMPHOCYTES RELATIVE PERCENT: 28.2 %
MAGNESIUM: 1.9 MG/DL (ref 1.8–2.4)
MCH RBC QN AUTO: 28.5 PG (ref 26–34)
MCHC RBC AUTO-ENTMCNC: 33.4 G/DL (ref 31–36)
MCV RBC AUTO: 85.3 FL (ref 80–100)
MONOCYTES ABSOLUTE: 0.7 K/UL (ref 0–1.3)
MONOCYTES RELATIVE PERCENT: 7.3 %
NEUTROPHILS ABSOLUTE: 5.4 K/UL (ref 1.7–7.7)
NEUTROPHILS RELATIVE PERCENT: 57.4 %
PDW BLD-RTO: 13.8 % (ref 12.4–15.4)
PHOSPHORUS: 3.8 MG/DL (ref 2.5–4.9)
PLATELET # BLD: 525 K/UL (ref 135–450)
PMV BLD AUTO: 7.1 FL (ref 5–10.5)
POTASSIUM SERPL-SCNC: 4.6 MMOL/L (ref 3.5–5.1)
RBC # BLD: 2.54 M/UL (ref 4–5.2)
SODIUM BLD-SCNC: 134 MMOL/L (ref 136–145)
TOTAL PROTEIN: 6.6 G/DL (ref 6.4–8.2)
URIC ACID, SERUM: 4.2 MG/DL (ref 2.6–6)
WBC # BLD: 9.4 K/UL (ref 4–11)

## 2022-11-14 PROCEDURE — 2580000003 HC RX 258: Performed by: STUDENT IN AN ORGANIZED HEALTH CARE EDUCATION/TRAINING PROGRAM

## 2022-11-14 PROCEDURE — 6370000000 HC RX 637 (ALT 250 FOR IP): Performed by: STUDENT IN AN ORGANIZED HEALTH CARE EDUCATION/TRAINING PROGRAM

## 2022-11-14 PROCEDURE — 6360000002 HC RX W HCPCS: Performed by: INTERNAL MEDICINE

## 2022-11-14 PROCEDURE — 6370000000 HC RX 637 (ALT 250 FOR IP): Performed by: INTERNAL MEDICINE

## 2022-11-14 PROCEDURE — APPNB30 APP NON BILLABLE TIME 0-30 MINS: Performed by: NURSE PRACTITIONER

## 2022-11-14 PROCEDURE — 80069 RENAL FUNCTION PANEL: CPT

## 2022-11-14 PROCEDURE — 94760 N-INVAS EAR/PLS OXIMETRY 1: CPT

## 2022-11-14 PROCEDURE — 6360000002 HC RX W HCPCS: Performed by: STUDENT IN AN ORGANIZED HEALTH CARE EDUCATION/TRAINING PROGRAM

## 2022-11-14 PROCEDURE — 84550 ASSAY OF BLOOD/URIC ACID: CPT

## 2022-11-14 PROCEDURE — 99024 POSTOP FOLLOW-UP VISIT: CPT | Performed by: SURGERY

## 2022-11-14 PROCEDURE — 80076 HEPATIC FUNCTION PANEL: CPT

## 2022-11-14 PROCEDURE — 83735 ASSAY OF MAGNESIUM: CPT

## 2022-11-14 PROCEDURE — 2580000003 HC RX 258: Performed by: INTERNAL MEDICINE

## 2022-11-14 PROCEDURE — 85025 COMPLETE CBC W/AUTO DIFF WBC: CPT

## 2022-11-14 RX ORDER — FERROUS SULFATE TAB EC 324 MG (65 MG FE EQUIVALENT) 324 (65 FE) MG
324 TABLET DELAYED RESPONSE ORAL
Qty: 30 TABLET | Refills: 1 | Status: SHIPPED | OUTPATIENT
Start: 2022-11-14

## 2022-11-14 RX ORDER — SULFAMETHOXAZOLE AND TRIMETHOPRIM 800; 160 MG/1; MG/1
1 TABLET ORAL EVERY 12 HOURS SCHEDULED
Status: DISCONTINUED | OUTPATIENT
Start: 2022-11-14 | End: 2022-11-14 | Stop reason: HOSPADM

## 2022-11-14 RX ORDER — OXYCODONE HYDROCHLORIDE 5 MG/1
5 TABLET ORAL EVERY 6 HOURS PRN
Qty: 1 TABLET | Refills: 0 | Status: SHIPPED | OUTPATIENT
Start: 2022-11-14 | End: 2022-11-19

## 2022-11-14 RX ORDER — ASPIRIN 81 MG/1
81 TABLET ORAL DAILY
Qty: 30 TABLET | Refills: 1 | Status: SHIPPED | OUTPATIENT
Start: 2022-11-14

## 2022-11-14 RX ORDER — MULTIVITAMIN WITH IRON
1 TABLET ORAL DAILY
Qty: 30 TABLET | Refills: 0 | Status: SHIPPED | OUTPATIENT
Start: 2022-11-14

## 2022-11-14 RX ORDER — SULFAMETHOXAZOLE AND TRIMETHOPRIM 800; 160 MG/1; MG/1
1 TABLET ORAL 2 TIMES DAILY
Qty: 28 TABLET | Refills: 0 | Status: SHIPPED | OUTPATIENT
Start: 2022-11-14 | End: 2022-11-28

## 2022-11-14 RX ADMIN — THERA TABS 1 TABLET: TAB at 08:42

## 2022-11-14 RX ADMIN — OXYCODONE HYDROCHLORIDE 10 MG: 10 TABLET ORAL at 08:42

## 2022-11-14 RX ADMIN — Medication 1 G: at 08:42

## 2022-11-14 RX ADMIN — Medication 1 G: at 13:14

## 2022-11-14 RX ADMIN — SODIUM CHLORIDE, PRESERVATIVE FREE 10 ML: 5 INJECTION INTRAVENOUS at 08:47

## 2022-11-14 RX ADMIN — FERROUS SULFATE TAB EC 324 MG (65 MG FE EQUIVALENT) 324 MG: 324 (65 FE) TABLET DELAYED RESPONSE at 08:42

## 2022-11-14 RX ADMIN — ASPIRIN 81 MG 81 MG: 81 TABLET ORAL at 08:42

## 2022-11-14 RX ADMIN — ENOXAPARIN SODIUM 30 MG: 100 INJECTION SUBCUTANEOUS at 08:43

## 2022-11-14 RX ADMIN — SODIUM CHLORIDE, PRESERVATIVE FREE 10 ML: 5 INJECTION INTRAVENOUS at 08:48

## 2022-11-14 RX ADMIN — CEFEPIME 2000 MG: 2 INJECTION, POWDER, FOR SOLUTION INTRAVENOUS at 03:25

## 2022-11-14 RX ADMIN — DOCUSATE SODIUM 100 MG: 100 CAPSULE, LIQUID FILLED ORAL at 08:42

## 2022-11-14 ASSESSMENT — PAIN DESCRIPTION - ORIENTATION: ORIENTATION: LEFT

## 2022-11-14 ASSESSMENT — PAIN DESCRIPTION - DESCRIPTORS: DESCRIPTORS: ACHING;DISCOMFORT

## 2022-11-14 ASSESSMENT — PAIN DESCRIPTION - LOCATION: LOCATION: GROIN

## 2022-11-14 ASSESSMENT — PAIN SCALES - GENERAL
PAINLEVEL_OUTOF10: 8
PAINLEVEL_OUTOF10: 0
PAINLEVEL_OUTOF10: 0

## 2022-11-14 ASSESSMENT — PAIN - FUNCTIONAL ASSESSMENT: PAIN_FUNCTIONAL_ASSESSMENT: ACTIVITIES ARE NOT PREVENTED

## 2022-11-14 ASSESSMENT — PAIN DESCRIPTION - FREQUENCY: FREQUENCY: CONTINUOUS

## 2022-11-14 ASSESSMENT — PAIN DESCRIPTION - PAIN TYPE: TYPE: SURGICAL PAIN

## 2022-11-14 ASSESSMENT — PAIN DESCRIPTION - ONSET: ONSET: ON-GOING

## 2022-11-14 NOTE — DISCHARGE INSTRUCTIONS
KALYN Dressing Removal  The dressing to your Left Groin is called a KALYN dressing  There is a small white box which is a battery pack which helps to provide constant suction to the white KALYN dressing on your skin  The small white box flashes green where it says \"OK\" when it is functioning properly - check the box periodically for proper function  The KALYN dressing is designed to stay in place for 7 days  When it is time to remove the dressing, push the orange button in the middle of the white box to turn the device off prior to removing the dressing  If the white box begins flashing red, you may remove the dressing at that time  Keep the dressing in place until 11/15/2022 unless the white box begins flashing red prior to this  On 11/15/2022, remove the dressing from your skin - this may be slightly uncomfortable as the adhesive pulls on your skin  Once removed, the entire dressing, white box and tubing can be thrown away as they are designed to be a one time use item  If you notice any drainage from your Left Groin site after removal, you may place a piece of gauze dressing over the incision site  If there is no drainage, there is no need to place a dressing over your groin incision

## 2022-11-14 NOTE — PROGRESS NOTES
Pt d/c'd to home with friend. Discharge instructions including med dosing and times reviewed with pt. Prescriptions including Oxycodone provided to pt by [facility] pharmacy. F/U appts including vascular, nephro, and ID reviewed with pt. Pt provided with instructions by vascular on Ric drain. Pt voiced understanding of all instructions. PICC line removed prior to discharge.   Electronically signed by Justen Marin RN on 11/14/22 at 2:58 PM EST

## 2022-11-14 NOTE — PROGRESS NOTES
Hospital Medicine Progress Note     Date:  11/14/2022    PCP: Janet Voss MD (Tel: [phone])    Date of Admission: 11/4/2022    Chief complaint:   Chief Complaint   Patient presents with    Abscess     Started 5 days ago left side of groin       Brief admission history: 60-year-old female with history of chronic hepatitis C, IV drug use, history of thrombosis, chronic cellulitis, who was admitted on November 4, 2022 with complaints of left groin pain, diagnosed with cellulitis and possible abscess of left groin (abscess has now been ruled out), for which she was started on IV antibiotics. Imaging also revealed large pseudoaneurysm of the distal left femoral artery, for which vascular surgery was consulted. On admission, urine drug screen was positive for cocaine and amphetamine. Assessment/plan:  Left groin large pseudoaneurysm, infected. Suspect secondary to IV drug use direct injection into vessel. S/p repair by vascular surgery on November 8, 2022. Patient has been on intravenous antibiotics, with recommendation to transition to oral Bactrim for 14 days at the time of discharge. Acute postoperative anemia. Etiology is multifactorial, including expected perioperative blood loss, frequent blood draw, hemodilution effect from intravenous fluid. Iron studies consistent with BUBBA. Continue supplemental iron. Recommend close monitoring of H&H at discharge, preferably repeat CBC with PCP within 1 week. Hyponatremia, nearly resolved - sodium 134 today. Etiology is multifactorial, including possible SIADH from pain from pain. She was on sodium chloride tablets overnight. Per discussion with Dr. Latisha Fisher, no need to prescribe on sodium chloride tablets as patient reports she typically salts her meals at home. She is to maintain 48-ounce fluid restriction. Illicit drug use. Counseled about cessation of drug use.    Other comorbidities: history of chronic hepatitis C, IV drug use, history of thrombosis, chronic cellulitis. Diet: ADULT DIET; Regular; 1200 ml    Code status: Full Code   ----------  Subjective  No new complaints today. She is eager to go home. Objective  Physical exam:  Vitals: BP (!) 123/93   Pulse (!) 103   Temp 97.5 °F (36.4 °C) (Oral)   Resp 18   Ht 5' 2\" (1.575 m)   Wt 108 lb 11 oz (49.3 kg)   SpO2 99%   BMI 19.88 kg/m²   Gen/overall appearance: Not in acute distress. Alert. Oriented x3. Head: Normocephalic, atraumatic  Eyes: EOMI, good acuity  ENT: Oral mucosa moist  Neck: No JVD, thyromegaly  CVS: Nml S1S2, no MRG, RRR  Pulm: Clear bilaterally. No crackles/wheezes  Gastrointestinal: Soft, NT/ND, +BS  Musculoskeletal: No edema. Warm  Neuro: No focal deficit. Moves extremity spontaneously. Psychiatry: Appropriate affect. Not agitated. Skin: Warm, dry with normal turgor. No rash  Capillary refill: Brisk,< 3 seconds   Peripheral Pulses: +2 palpable, equal bilaterally      24HR INTAKE/OUTPUT:    Intake/Output Summary (Last 24 hours) at 11/14/2022 0759  Last data filed at 11/14/2022 7489  Gross per 24 hour   Intake 2929.69 ml   Output 500 ml   Net 2429.69 ml       I/O last 3 completed shifts: In: 2929.7 [P.O.:1320; I.V.:122.9; IV Piggyback:1486.8]  Out: 1000 [Urine:1000]  No intake/output data recorded.   Meds:    ferrous sulfate  324 mg Oral Daily with breakfast    docusate sodium  100 mg Oral BID    sodium chloride  1 g Oral TID WC    multivitamin  1 tablet Oral Daily    cefepime  2,000 mg IntraVENous Q12H    vancomycin  750 mg IntraVENous Q12H    polyethylene glycol  17 g Oral Daily    sodium chloride flush  5-40 mL IntraVENous 2 times per day    enoxaparin  30 mg SubCUTAneous Daily    aspirin  81 mg Oral Daily    sodium chloride flush  5-40 mL IntraVENous 2 times per day    vancomycin (VANCOCIN) intermittent dosing (placeholder)   Other RX Placeholder    nicotine  1 patch TransDERmal Daily    lidocaine 1 % injection  5 mL IntraDERmal Once     Infusions:    sodium chloride      sodium chloride Stopped (11/13/22 1355)     PRN Meds: bisacodyl, bisacodyl, sodium chloride flush, sodium chloride, calcium carbonate, sodium chloride flush, sodium chloride, acetaminophen **OR** acetaminophen, ondansetron, oxyCODONE **OR** oxyCODONE    Labs/imaging:  CBC:   Recent Labs     11/12/22 0410 11/13/22  0530 11/14/22  0623   WBC 9.6 9.0 9.4   HGB 7.9* 7.6* 7.2*   * 488* 525*       BMP:    Recent Labs     11/12/22 0410 11/13/22  0530 11/14/22  0623   * 128* 134*   K 4.7 4.2 4.6   CL 98* 96* 102   CO2 23 26 25   BUN 15 17 16   CREATININE 0.6 0.7 0.7   GLUCOSE 144* 103* 101*       Hepatic:   Recent Labs     11/14/22  0623   AST 18   ALT 13   BILITOT <0.2   ALKPHOS 110       Olvin Walsh MD  -------------------------------  Rounding hospitalist

## 2022-11-14 NOTE — DISCHARGE SUMMARY
Hospital Discharge Summary    Patient's PCP: Regine Santacruz MD  Admit Date: 11/4/2022   Discharge Date: 11/14/2022    Admitting Physician: Dr. Andra Watson,   Discharge Physician: Dr. Abraham Ngo MD     Consults:   IP CONSULT TO INFECTIOUS DISEASES  IP CONSULT TO VASCULAR SURGERY  PHARMACY TO DOSE VANCOMYCIN  IP CONSULT TO NEPHROLOGY    Brief HPI: Patient admitted with left femoral artery pseudoaneurysm infection    Brief hospital course: 60-year-old female with history of chronic hepatitis C, IV drug use, history of thrombosis, chronic cellulitis, who was admitted on November 4, 2022 with complaints of left groin pain, diagnosed with cellulitis and possible abscess of left groin (abscess has now been ruled out), for which she was started on IV antibiotics. Imaging also revealed large pseudoaneurysm of the distal left femoral artery, for which vascular surgery was consulted. On admission, urine drug screen was positive for cocaine and amphetamine. Assessment/plan:  Left groin large pseudoaneurysm, infected. Suspect secondary to IV drug use direct injection into vessel. S/p repair by vascular surgery on November 8, 2022. Patient has been on intravenous antibiotics, with recommendation to transition to oral Bactrim for 14 days at the time of discharge. Acute postoperative anemia. Etiology is multifactorial, including expected perioperative blood loss, frequent blood draw, hemodilution effect from intravenous fluid. Iron studies consistent with BUBBA. Continue supplemental iron. Recommend close monitoring of H&H at discharge, preferably repeat CBC with PCP within 1 week. Hyponatremia, nearly resolved - sodium 134 today. Etiology is multifactorial, including possible SIADH from pain from pain. She was on sodium chloride tablets overnight. Per discussion with Dr. Emiliano Blackwell, no need to prescribe on sodium chloride tablets as patient reports she typically salts her meals at home.  She is to maintain 48-ounce fluid restriction. Illicit drug use. Counseled about cessation of drug use. Other comorbidities: history of chronic hepatitis C, IV drug use, history of thrombosis, chronic cellulitis. Invasive procedures:  1. Left common femoral artery replacement using a arterial homograft graft. 2.  Sartorius muscle flap. Discharge Diagnoses:   See above. Physical Exam: /88   Pulse 100   Temp 97.4 °F (36.3 °C) (Oral)   Resp 18   Ht 5' 2\" (1.575 m)   Wt 108 lb 11 oz (49.3 kg)   SpO2 99%   BMI 19.88 kg/m²   Gen/overall appearance: Not in acute distress. Alert. Oriented X3  Head: Normocephalic, atraumatic  Eyes: EOMI, good acuity  ENT: Oral mucosa moist  Neck: No JVD, thyromegaly  CVS: Nml S1S2, no MRG, RRR  Pulm: Clear bilaterally. No crackles/wheezes  Gastrointestinal: Soft, NT/ND, +BS  Musculoskeletal: No edema. Warm  Neuro: No focal deficit. Moves extremity spontaneously. Psychiatry: Appropriate affect. Not agitated. Skin: Warm, dry with normal turgor. No rash  Capillary refill: Brisk,< 3 seconds   Peripheral Pulses: +2 palpable, equal bilaterally     Significant diagnostic studies that may require follow up:  CTA LOWER EXTREMITY LEFT W CONTRAST    Result Date: 11/4/2022  EXAMINATION: CTA OF THE LEFT LOWER EXTREMITY 11/4/2022 3:04 am TECHNIQUE: CTA of the left lower extremity was performed after the administration of intravenous contrast. Multiplanar reformatted images are provided for review. MIP images are provided for review. Automated exposure control, iterative reconstruction, and/or weight based adjustment of the mA/kV was utilized to reduce the radiation dose to as low as reasonably achievable. 3D reconstructed images were performed on a separate workstation and provided for review. COMPARISON: None.  HISTORY ORDERING SYSTEM PROVIDED HISTORY: swelling, infection TECHNOLOGIST PROVIDED HISTORY: Reason for exam:->swelling, infection Reason for Exam: swelling, infection in groin History of IV drug abuse with inflammation of the left groin. FINDINGS: CTA: The common, internal and external iliac arteries are normal in the pelvis. On the left, there is a pseudoaneurysm of the distal femoral artery just proximal to bifurcation. This measures approximately 2.9 x 1.5 cm. There is intimal thickening around the aneurysm and along the distal course of the femoral artery which likely represents changes of associated vasculitis. Additional inflammation along the proximal superficial femoral artery. The profunda femoral artery is patent. The popliteal artery is patent. Bones: No skeletal erosions in the pelvis or, hip or femur. Soft Tissue: Cellulitis with mild inflammation of the soft tissue superficial to the pseudoaneurysm described above. There also bulky reactive lymph nodes in the groin measuring up to 1.7 cm. Joint: No joint effusion in the left hip. 1. Large pseudoaneurysm of the distal left femoral artery likely correlating to history of IV drug abuse. 2. There is interval thickening and inflammation along the femoral artery and the proximal superficial femoral artery suggesting vasculitis in association with above. 3. Cellulitis of the left groin with multiple reactive enlarged lymph nodes. Treatments: As above. Discharge Medications:     Medication List        START taking these medications      aspirin EC 81 MG EC tablet  Take 1 tablet by mouth daily     ferrous sulfate 324 (65 Fe) MG EC tablet  Take 1 tablet by mouth daily (with breakfast)     multivitamin Tabs tablet  Take 1 tablet by mouth daily     oxyCODONE 5 MG immediate release tablet  Commonly known as: ROXICODONE  Take 1 tablet by mouth every 6 hours as needed for Pain for up to 5 days.      sulfamethoxazole-trimethoprim 800-160 MG per tablet  Commonly known as: BACTRIM DS;SEPTRA DS  Take 1 tablet by mouth 2 times daily for 14 days            CONTINUE taking these medications      acetaminophen 500 MG tablet  Commonly known as: TYLENOL            STOP taking these medications      ibuprofen 200 MG tablet  Commonly known as: ADVIL;MOTRIN               Where to Get Your Medications        These medications were sent to [facility], [address] - F [phone]  [address] [facility] [address], [address] [facility]      Phone: [phone]   aspirin EC 81 MG EC tablet  ferrous sulfate 324 (65 Fe) MG EC tablet  multivitamin Tabs tablet  oxyCODONE 5 MG immediate release tablet  sulfamethoxazole-trimethoprim 800-160 MG per tablet         Activity: activity as tolerated  Diet: ADULT DIET; Regular; 1200 ml      Disposition: home  Discharged Condition: Stable  Follow Up:   Quita Fenton MD  [address]  ML: [address] [place] [address]  [phone]    Schedule an appointment as soon as possible for a visit in 1 week(s)      [place] [address]  [phone]    Schedule an appointment as soon as possible for a visit in 1 week(s)      Erickson Granda MD  [address] [place] [address]  [phone]    Schedule an appointment as soon as possible for a visit in 1 week(s)      [facility] [address] [place] [address]    Schedule an appointment as soon as possible for a visit in 1 week(s)        Code status:  Full Code     Total time spent on discharge, finalizing medications, referrals and arranging outpatient follow up was more than 30 minutes      Thank you Dr. Cherylene Evans, MD for the opportunity to be involved in this patients care.

## 2022-11-14 NOTE — PROGRESS NOTES
Planning discharge this morning. Awaiting nephro rec for final dispo. Meds sent to pharmacy.     SIGNED: Gretta Anderson MD, MPH. 11/14/2022

## 2022-11-14 NOTE — PROGRESS NOTES
[facility]  Progress Note    11/14/2022 9:21 AM    Chief Complaint: Left Groin Pain    Reason for Consult: Left Groin Abscess    POD #6 Left Common Femoral Artery Replacement with Cadaver Graft and Sartorius Muscle Flap with Dr. Ava Paez    Subjective: Pt seen resting in bed, KALYN dressing flashing green, pt reports pain is adequately controlled    Vital Signs:  Vitals:    11/13/22 2038 11/14/22 0000 11/14/22 0327 11/14/22 0815   BP: 123/83 120/82 (!) 123/93 129/88   Pulse: (!) 115 100 (!) 103 100   Resp: 18 18 18 18   Temp: 97.8 °F (36.6 °C) 97.7 °F (36.5 °C) 97.5 °F (36.4 °C) 97.4 °F (36.3 °C)   TempSrc: Axillary Oral Oral Oral   SpO2: 99% 98% 99%    Weight:   108 lb 11 oz (49.3 kg)    Height:           I/O:    Intake/Output Summary (Last 24 hours) at 11/14/2022 0921  Last data filed at 11/14/2022 0848  Gross per 24 hour   Intake 3529.69 ml   Output 500 ml   Net 3029.69 ml         Physical Exam:   General: no apparent distress, alert and oriented, afebrile  Chest/Lungs: non labored breathing no tachypnea, retractions or cyanosis  Abdomen: abdomen soft and non-distended  Extremities: normal ROM, KALYN dressing peeled back, KALYN dressing remains in place and flashing green, small amount of dressing to distal and proximal aspect of white KALYN dressing, skin surrounding dressing soft, Left Leg warm and able to move leg, foot and toes without difficulty  Vascular: extremities warm and well perfused, no signs of cyanosis or ischemia    Pulses:  -L DP: +2/4 palpable    Labs:   Lab Results   Component Value Date/Time     11/14/2022 06:23 AM    K 4.6 11/14/2022 06:23 AM    K 3.7 11/16/2019 12:58 AM     11/14/2022 06:23 AM    CO2 25 11/14/2022 06:23 AM    BUN 16 11/14/2022 06:23 AM    CREATININE 0.7 11/14/2022 06:23 AM    GFRAA >60 11/16/2019 12:58 AM    LABGLOM >60 11/14/2022 06:23 AM    GLUCOSE 101 11/14/2022 06:23 AM    PHOS 3.8 11/14/2022 06:23 AM    MG 1.90 11/14/2022 06:23 AM    CALCIUM 8.8 11/14/2022 06:23 AM     Lab Results   Component Value Date/Time    WBC 9.4 11/14/2022 06:23 AM    RBC 2.54 11/14/2022 06:23 AM    HGB 7.2 11/14/2022 06:23 AM    HCT 21.7 11/14/2022 06:23 AM    MCV 85.3 11/14/2022 06:23 AM    RDW 13.8 11/14/2022 06:23 AM     11/14/2022 06:23 AM   No results found for: INR, PROTIME     Scheduled Meds:    ferrous sulfate  324 mg Oral Daily with breakfast    docusate sodium  100 mg Oral BID    sodium chloride  1 g Oral TID WC    multivitamin  1 tablet Oral Daily    cefepime  2,000 mg IntraVENous Q12H    vancomycin  750 mg IntraVENous Q12H    polyethylene glycol  17 g Oral Daily    sodium chloride flush  5-40 mL IntraVENous 2 times per day    enoxaparin  30 mg SubCUTAneous Daily    aspirin  81 mg Oral Daily    sodium chloride flush  5-40 mL IntraVENous 2 times per day    vancomycin (VANCOCIN) intermittent dosing (placeholder)   Other RX Placeholder    nicotine  1 patch TransDERmal Daily    lidocaine 1 % injection  5 mL IntraDERmal Once     Continuous Infusions:    sodium chloride      sodium chloride Stopped (11/13/22 1355)       Imaging:   CTA LLE - 11/4/2022  Impression  1. Large pseudoaneurysm of the distal left femoral artery likely correlating to history of IV drug abuse. 2. There is interval thickening and inflammation along the femoral artery and the proximal superficial femoral artery suggesting vasculitis in association with above. 3. Cellulitis of the left groin with multiple reactive enlarged lymph nodes.     Assessment:   -Left Distal Femoral Artery Pseudoaneurysm  -POD #6 Left Common Femoral Artery Replacement with Cadaver Graft and Sartorius Muscle Flap with Dr. Gomez Cover  -KALYN dressing remains in place - flashing green  -Left Foot warm, DP palpable    Plan:  -Antibiotics - per ID - recommending Bactrim BID x14 days at D/C  -Keep KALYN dressing in place - due to be removed tomorrow on POD #7  -Okay for D/C home today with Antibiotics  -KALYN to be removed tomorrow by pt - instructions provided  -F/U with Dr. Cynthia Reid the week of 11/28/2022    All pertinent information and plan of care discussed with Dr. Kathaleen Schaumann. All questions and concerns were addressed with the patient. I have discussed the above stated plan with the patient and the nurse. The patient verbalized understanding and agreed with the plan. Thank you for allowing to us to participate in the care of Florine Kayser      Electronically signed by SANYA Stone - CNP on 11/14/2022 at 9:21 AM   Pt seen and examined. for DIAGNOSIS OF pseudoaneurysm left groin agree with SANYA Stone's note. Labs reviewed. Vitals   Vitals:    11/14/22 0327 11/14/22 0815 11/14/22 0912 11/14/22 0939   BP: (!) 123/93 129/88     Pulse: (!) 103 100     Resp: 18 18 18    Temp: 97.5 °F (36.4 °C) 97.4 °F (36.3 °C)     TempSrc: Oral Oral     SpO2: 99%   98%   Weight: 108 lb 11 oz (49.3 kg)      Height:             Is doing well after the replacement of the artery and sartorius flap kathrine as 1 maybe 1-1/2 days left patient understands how to take it off, has a good pulse in her foot, is to follow-up with Dr. Cynthia Reid in a couple weeks.   She knows that she should take her antibiotics

## 2022-11-14 NOTE — PROGRESS NOTES
Nephrology Progress Note   [facility]      Chief Complaint: swelling in the left groin    Reason for Consultation: Hyponatremia    History of Present Illness: Ms Caitlin Fisher is a 48 yo female with a past medical history significant for Hep C, DVT, polysubstance abuse, pre-eclampsia that presented to the ED with swelling in the left groin. Patient has been injecting drugs for a long time she been using all the veins she admits to injecting into the left groin she missed her vein and hit her artery in the past.  She was using opiates in the past and now started using cocaine and Amphetamines - patient admitted to the hospital started on antibiotics and underwent left femoral artery replacement with sartorius flap to address left femoral pseudoaneurysm    Patient stated she hasn't been eating much in the hospital because the food is bland and has been drinking a lot of water    Subjective:    Resting in recliner; NAD    ROS: occasional pain at incision site.  Otherwise denies shortness of breath, chest pain, obstructive urinary symptoms - all other ROS negative    Scheduled Meds:   sulfamethoxazole-trimethoprim  1 tablet Oral 2 times per day    ferrous sulfate  324 mg Oral Daily with breakfast    docusate sodium  100 mg Oral BID    sodium chloride  1 g Oral TID WC    multivitamin  1 tablet Oral Daily    polyethylene glycol  17 g Oral Daily    sodium chloride flush  5-40 mL IntraVENous 2 times per day    enoxaparin  30 mg SubCUTAneous Daily    aspirin  81 mg Oral Daily    sodium chloride flush  5-40 mL IntraVENous 2 times per day    nicotine  1 patch TransDERmal Daily    lidocaine 1 % injection  5 mL IntraDERmal Once        sodium chloride      sodium chloride Stopped (11/13/22 1355)       PRN Meds:.bisacodyl, bisacodyl, sodium chloride flush, sodium chloride, calcium carbonate, sodium chloride flush, sodium chloride, acetaminophen **OR** acetaminophen, ondansetron, oxyCODONE **OR** oxyCODONE    Physical Exam: TEMPERATURE:  Current - Temp: 97.4 °F (36.3 °C); Max - Temp  Av.7 °F (36.5 °C)  Min: 97.4 °F (36.3 °C)  Max: 98.1 °F (36.7 °C)  RESPIRATIONS RANGE: Resp  Av.3  Min: 18  Max: 20  PULSE RANGE: Pulse  Av  Min: 72  Max: 115  BLOOD PRESSURE RANGE:  Systolic (23ZTC), IQU:831 , Min:111 , WSY:592   ; Diastolic (33BWU), NU, Min:72, Max:93    24HR INTAKE/OUTPUT:    Intake/Output Summary (Last 24 hours) at 2022 1404  Last data filed at 2022 1327  Gross per 24 hour   Intake 3409.69 ml   Output 500 ml   Net 2909.69 ml             Patient Vitals for the past 96 hrs (Last 3 readings):   Weight   22 0327 108 lb 11 oz (49.3 kg)   22 1415 108 lb 3.9 oz (49.1 kg)   22 0523 109 lb 9.1 oz (49.7 kg)         General: Alert, Awake, NAD  HEENT: Normocephalic, atraumatic, Nose and ears appear externally without deformity  Eyes: EOMI, DAVE  Chest: clear to auscultation, no intercostal retractions  CVS: RRR, no murmur, no rub  Abdomen: soft, non tender, no organomegaly, no bruit appreciated  Extremities: no edema, no cyanosis. Skin: normal texture, normal skin turgor, no rash  Musculoskeletal: normal ROM, no joint swelling, no visible deformity. Surgical dressing left groin  Neurological: no focal motor neurological deficit  Psych: O x 3    Past Medical History:   Diagnosis Date    Hepatitis C without hepatic coma     Hx of blood clots     DEEP VEIN THROMBOSIS FROM IV DRUG USE    IV drug abuse ([facility] [address]     None for 5 mos    Methadone use      Past Surgical History:   Procedure Laterality Date    BREAST BIOPSY Right 2015    Excisional     SECTION      FEMORAL BYPASS Left 2022    LEFT FEMORAL ARTERY REPLACEMENT WITH CADAVER VEIN AND SARTORIUS MUSCLE  FLAP performed by Zach Drain, DO at [address]       History reviewed. No pertinent family history.     Social History     Socioeconomic History    Marital status: Single     Spouse name: Not on file    Number of children: Not on file    Years of education: Not on file    Highest education level: Not on file   Occupational History    Not on file   Tobacco Use    Smoking status: Every Day     Packs/day: 1.00     Years: 20.00     Pack years: 20.00     Types: Cigarettes    Smokeless tobacco: Never   Substance and Sexual Activity    Alcohol use: Yes    Drug use: Yes     Frequency: 1.0 times per week     Types: IV, Cocaine    Sexual activity: Yes     Partners: Male   Other Topics Concern    Not on file   Social History Narrative    Not on file     Social Determinants of Health     Financial Resource Strain: Not on file   Food Insecurity: Not on file   Transportation Needs: Not on file   Physical Activity: Not on file   Stress: Not on file   Social Connections: Not on file   Intimate Partner Violence: Not on file   Housing Stability: Not on file         Allergies:   Allergies   Allergen Reactions    Pcn [Penicillins] Hives          LAB DATA:    CBC:   Lab Results   Component Value Date/Time    WBC 9.4 11/14/2022 06:23 AM    RBC 2.54 11/14/2022 06:23 AM    HGB 7.2 11/14/2022 06:23 AM    HCT 21.7 11/14/2022 06:23 AM    MCV 85.3 11/14/2022 06:23 AM    MCH 28.5 11/14/2022 06:23 AM    MCHC 33.4 11/14/2022 06:23 AM    RDW 13.8 11/14/2022 06:23 AM     11/14/2022 06:23 AM    MPV 7.1 11/14/2022 06:23 AM     BMP:    Lab Results   Component Value Date/Time     11/14/2022 06:23 AM    K 4.6 11/14/2022 06:23 AM    K 3.7 11/16/2019 12:58 AM     11/14/2022 06:23 AM    CO2 25 11/14/2022 06:23 AM    BUN 16 11/14/2022 06:23 AM    CREATININE 0.7 11/14/2022 06:23 AM    CALCIUM 8.8 11/14/2022 06:23 AM    GFRAA >60 11/16/2019 12:58 AM    LABGLOM >60 11/14/2022 06:23 AM    GLUCOSE 101 11/14/2022 06:23 AM     Ionized Calcium:  No results found for: IONCA  Magnesium:    Lab Results   Component Value Date/Time    MG 1.90 11/14/2022 06:23 AM     Phosphorus:    Lab Results   Component Value Date/Time    PHOS 3.8 11/14/2022 06:23 AM     U/A: Lab Results   Component Value Date/Time    COLORU Yellow 01/01/2019 09:39 PM    PHUR 6.5 11/05/2022 12:09 PM    CLARITYU Clear 01/01/2019 09:39 PM    SPECGRAV >=1.030 01/01/2019 09:39 PM    LEUKOCYTESUR Negative 01/01/2019 09:39 PM    UROBILINOGEN 0.2 01/01/2019 09:39 PM    BILIRUBINUR Negative 01/01/2019 09:39 PM    BLOODU Negative 01/01/2019 09:39 PM    GLUCOSEU Negative 01/01/2019 09:39 PM         IMPRESSION/RECOMMENDATIONS:      Active Problems:    Cellulitis    Pseudoaneurysm (HCC)    Femoral artery pseudo-aneurysm, left (HCC)    IV drug abuse (HCC)    Inguinal adenopathy    Hep C w/o coma, chronic (HCC)    Elevated erythrocyte sedimentation rate    CRP elevated  Resolved Problems:    * No resolved hospital problems. *    Hyponatremia - Maintain patient on FR 1.5 L / day  - Urine osmolality appropriately low  - U Na 55  - Sodium corrected at desired rate on FR and sodium chloride tablets that don't need to be continued on discharge. Instructed patient to eat more protein    2. Femoral artery pseudoaneurysm - s/p eft femoral artery replacement with sartorius flap    3.  Hx of substance abuse    Ok to d/c from renal standpoint; no follow up needed

## 2022-11-14 NOTE — CARE COORDINATION
CASE MANAGEMENT DISCHARGE SUMMARY:    DISCHARGE DATE: 11/14/2022    DISCHARGED TO HOME     TRANSPORTATION: via friends or public transport             TIME: TBD by patient and bedside RN              COMMENTS: Patient denies discharge needs at this time.     Electronically signed by Marlinda Felty, RN on 11/14/2022 at 11:26 AM

## 2022-11-14 NOTE — PLAN OF CARE
Problem: Discharge Planning  Goal: Discharge to home or other facility with appropriate resources  Outcome: Completed     Problem: Safety - Adult  Goal: Free from fall injury  Outcome: Completed     Problem: Pain  Goal: Verbalizes/displays adequate comfort level or baseline comfort level  Outcome: Completed     Problem: Respiratory - Adult  Goal: Achieves optimal ventilation and oxygenation  Outcome: Completed     Problem: Cardiovascular - Adult  Goal: Maintains optimal cardiac output and hemodynamic stability  Outcome: Completed     Problem: Infection - Adult  Goal: Absence of infection at discharge  Outcome: Completed     Problem: Hematologic - Adult  Goal: Maintains hematologic stability  Outcome: Completed     Problem: Skin/Tissue Integrity  Goal: Absence of new skin breakdown  Description: 1. Monitor for areas of redness and/or skin breakdown  2. Assess vascular access sites hourly  3. Every 4-6 hours minimum:  Change oxygen saturation probe site  4. Every 4-6 hours:  If on nasal continuous positive airway pressure, respiratory therapy assess nares and determine need for appliance change or resting period.   Outcome: Completed

## 2022-11-21 ENCOUNTER — TELEPHONE (OUTPATIENT)
Dept: VASCULAR SURGERY | Age: 52
End: 2022-11-21

## 2022-11-21 NOTE — TELEPHONE ENCOUNTER
Evangelina Hurd says her leg is beginning to be very painful as the numbness of her leg wears off. Is it possible to send her in more pain medication? If so, please send rx to [facility] on [address].

## 2022-11-22 NOTE — TELEPHONE ENCOUNTER
Unable to connect to pt. Rapid busy signal.  Requested pt check her phone settings to make sure unknown numbers are not blocked. She will come to the office on Wednesday at 11am as per Dr Go Mcintyre.

## 2022-11-23 ENCOUNTER — OFFICE VISIT (OUTPATIENT)
Dept: VASCULAR SURGERY | Age: 52
End: 2022-11-23

## 2022-11-23 DIAGNOSIS — G89.18 POST-OP PAIN: ICD-10-CM

## 2022-11-23 DIAGNOSIS — Z09 POSTOP CHECK: Primary | ICD-10-CM

## 2022-11-23 PROCEDURE — 99024 POSTOP FOLLOW-UP VISIT: CPT | Performed by: STUDENT IN AN ORGANIZED HEALTH CARE EDUCATION/TRAINING PROGRAM

## 2022-11-23 RX ORDER — OXYCODONE HYDROCHLORIDE 5 MG/1
5 TABLET ORAL EVERY 6 HOURS PRN
Qty: 28 TABLET | Refills: 0 | Status: SHIPPED | OUTPATIENT
Start: 2022-11-23 | End: 2022-11-30

## 2022-11-23 NOTE — PROGRESS NOTES
Agatha Matthews (:  1970) is a 47 y.o. female,Established patient, here for evaluation of the following chief complaint(s):  Post-Op Check         ASSESSMENT/PLAN:  1. Postop check    This is a 47year old female patient s/p repair of femoral pseudoaneurysm. Overall okay. Marginal wound breakdown. Discussed general basic wound care and hygiene. Will remove sutures, place steri strips and coat incision in betadine. Recommend betadine application daily. Keep covered if planning to shower. Follow up in 2 weeks to assess for resolution. All questions answered. Okay for one more narcotic prescription given persistent wound healing issues. Patient has narcotic abuse history dating to 7 years ago, PDMP reviewed thoroughly. Subjective   SUBJECTIVE/OBJECTIVE:  This is a 47year old female patient s/p femoral pseudoaneurysm repair with cadaveric homograft. Autogenous graft was unavailable due to lack of size or comparable nature. Patient had a sartorius flap. Endorses some drainage that was cloudy in nature. No fever or chills. No bleeding. Objective   Physical Exam  Cardiovascular:      Rate and Rhythm: Normal rate and regular rhythm. Pulses: Normal pulses. Pulmonary:      Effort: Pulmonary effort is normal. No respiratory distress. Skin:     General: Skin is warm and dry. Comments: Incision mostly healed, small 0.5x0.25 cm area in groin crease of fibrinous breakdown, no drainage expressed, no tunneling upon probing. Some scab and eschar over more cephalad portion of incision, marginal bland erythema, no signs of active infection, medical assistant Gerson Craven as chaperone and removed sutures as well as performed wound care    Neurological:      Mental Status: She is alert.           Tristan Kimbrough DO, FACS, FSVS, [address] [facility]

## 2022-12-05 ENCOUNTER — TELEPHONE (OUTPATIENT)
Dept: VASCULAR SURGERY | Age: 52
End: 2022-12-05

## 2022-12-05 NOTE — TELEPHONE ENCOUNTER
Pt requesting a refill of medication for pain. [facility] on [address].     Pt rescheduled her post op to Thursday 12-8-22

## 2022-12-05 NOTE — TELEPHONE ENCOUNTER
Patient would need to be seen in office to assess needs for further narcotic therapy. Consider alternatives such as tylenol, ibuprofen for pain management needs.

## 2025-01-13 NOTE — PLAN OF CARE
Problem: Discharge Planning  Goal: Discharge to home or other facility with appropriate resources  11/4/2022 1007 by Eulogio Monson RN  Outcome: Progressing  Flowsheets (Taken 11/4/2022 0511 by Jim Garcia RN)  Discharge to home or other facility with appropriate resources: Identify barriers to discharge with patient and caregiver  11/4/2022 0458 by Jim Garcia RN  Outcome: Progressing  Flowsheets (Taken 11/4/2022 0456)  Discharge to home or other facility with appropriate resources: Identify barriers to discharge with patient and caregiver     Problem: Safety - Adult  Goal: Free from fall injury  11/4/2022 1007 by Eulogio Monson RN  Outcome: Progressing  Note: Fall precautions in place. Bed locked and in lowest position. Call light within reach.  Room kept free of clutter  11/4/2022 0458 by Jim Garcia RN  Outcome: Progressing Detail Level: Detailed Depth Of Biopsy: dermis Was A Bandage Applied: Yes Size Of Lesion In Cm: 0 Biopsy Type: H and E Biopsy Method: Dermablade Anesthesia Type: 1% lidocaine with epinephrine Anesthesia Volume In Cc: 0.5 Hemostasis: Drysol Wound Care: Petrolatum Dressing: bandage Destruction After The Procedure: No Type Of Destruction Used: Curettage Curettage Text: The wound bed was treated with curettage after the biopsy was performed. Cryotherapy Text: The wound bed was treated with cryotherapy after the biopsy was performed. Electrodesiccation Text: The wound bed was treated with electrodesiccation after the biopsy was performed. Electrodesiccation And Curettage Text: The wound bed was treated with electrodesiccation and curettage after the biopsy was performed. Silver Nitrate Text: The wound bed was treated with silver nitrate after the biopsy was performed. Lab: [id number] Lab Facility: [id number] Medical Necessity Information: It is in your best interest to select a reason for this procedure from the list below. All of these items fulfill various CMS LCD requirements except the new and changing color options. Consent: Written consent was obtained and risks were reviewed including but not limited to scarring, infection, bleeding, scabbing, incomplete removal, nerve damage and allergy to anesthesia. Post-Care Instructions: I reviewed with the patient in detail post-care instructions. Patient is to keep the biopsy site dry overnight, and then apply bacitracin twice daily until healed. Patient may apply hydrogen peroxide soaks to remove any crusting. Notification Instructions: Patient will be notified of biopsy results. However, patient instructed to call the office if not contacted within 2 weeks. Billing Type: Third-Party Bill Information: Selecting Yes will display possible errors in your note based on the variables you have selected. This validation is only offered as a suggestion for you. PLEASE NOTE THAT THE VALIDATION TEXT WILL BE REMOVED WHEN YOU FINALIZE YOUR NOTE. IF YOU WANT TO FAX A PRELIMINARY NOTE YOU WILL NEED TO TOGGLE THIS TO 'NO' IF YOU DO NOT WANT IT IN YOUR FAXED NOTE.

## (undated) DEVICE — FOGARTY - HYDRAGRIP SURGICAL - CLAMP INSERTS: Brand: FOGARTY SOFTJAW

## (undated) DEVICE — SUTURE VCRL UD BR CT 3-0 18IN CT1 J838D

## (undated) DEVICE — SHEET,DRAPE,53X77,STERILE: Brand: MEDLINE

## (undated) DEVICE — SUTURE PDS LL SZ 2-0 L18IN ABSRB VLT CT-1 L36MM 1/2 CIR Z739D

## (undated) DEVICE — MAJOR VASCULAR: Brand: MEDLINE INDUSTRIES, INC.

## (undated) DEVICE — DRAIN,WOUND,15FR,3/16,FULL-FLUTED: Brand: MEDLINE

## (undated) DEVICE — TOTAL TRAY, 16FR 10ML SIL FOLEY, URN: Brand: MEDLINE

## (undated) DEVICE — EXTENSION SET, MALE LUER LOCK ADAPTER

## (undated) DEVICE — LOOP VES W1.3MM THK0.9MM MINI YEL SIL FLD REPELLENT

## (undated) DEVICE — NEEDLE HYPO 25GA L1.5IN BVL ORIENTED ECLIPSE

## (undated) DEVICE — PICO 7 10CM X 40CM: Brand: PICO™ 7

## (undated) DEVICE — SUTURE PROL SZ 5-0 L36IN NONABSORBABLE BLU L13MM C-1 3/8 8720H

## (undated) DEVICE — RESERVOIR,SUCTION,100CC,SILICONE: Brand: MEDLINE

## (undated) DEVICE — DRAPE THER FLUID WARMING 66X44 IN FLAT SLUSH DBL DISC ORS

## (undated) DEVICE — SYRINGE MED 30ML STD CLR PLAS LUERLOCK TIP N CTRL DISP

## (undated) DEVICE — C-ARM: Brand: UNBRANDED

## (undated) DEVICE — SUTURE PROL SZ 6-0 L30IN NONABSORBABLE BLU L11MM BV 3/8 CIR 8776H

## (undated) DEVICE — GLOVE ORTHO 7 1/2   MSG9475

## (undated) DEVICE — ZIMMER® STERILE DISPOSABLE TOURNIQUET CUFF WITH PLC, DUAL PORT, SINGLE BLADDER, 30 IN. (76 CM)

## (undated) DEVICE — PRUITT OCCLUSION CATHETER, 3F, EIFU: Brand: PRUITT OCCLUSION CATHETER

## (undated) DEVICE — PRUITT OCCLUSION CATHETER, 4F, EIFU: Brand: PRUITT OCCLUSION CATHETER

## (undated) DEVICE — SUTURE NONABSORBABLE MONOFILAMENT 7-0 BV-1 1X24 IN PROLENE 8702H

## (undated) DEVICE — SUTURE VCRL + SZ 3-0 L27IN ABSRB WHT CT-1 1/2 CIR VCP258H

## (undated) DEVICE — 450 ML BOTTLE OF 0.05% CHLORHEXIDINE GLUCONATE IN 99.95% STERILE WATER FOR IRRIGATION, USP AND APPLICATOR.: Brand: IRRISEPT ANTIMICROBIAL WOUND LAVAGE

## (undated) DEVICE — INTENDED FOR TISSUE SEPARATION, AND OTHER PROCEDURES THAT REQUIRE A SHARP SURGICAL BLADE TO PUNCTURE OR CUT.: Brand: BARD-PARKER ® STAINLESS STEEL BLADES

## (undated) DEVICE — SUTURE PERMAHAND SZ 2-0 L18IN NONABSORBABLE BLK L26MM SH C012D

## (undated) DEVICE — APPLIER CLP L9.38IN M LIG TI DISP STR RNG HNDL LIGACLP

## (undated) DEVICE — PICO 7 10CM X 30CM: Brand: PICO™ 7

## (undated) DEVICE — PACK DRP UNIV W BK TBL MAYO STD BTM TOP SIDE UTIL CVR ZONE

## (undated) DEVICE — SUTURE VCRL SZ 2-0 L18IN ABSRB UD CT-1 L36MM 1/2 CIR J839D

## (undated) DEVICE — SOLUTION IV IRRIG POUR BRL 0.9% SODIUM CHL 2F7124

## (undated) DEVICE — 3M™ COBAN™ NL STERILE NON-LATEX SELF-ADHERENT WRAP, 2084S, 4 IN X 5 YD (10 CM X 4,5 M), 18 ROLLS/CASE: Brand: 3M™ COBAN™

## (undated) DEVICE — SPONGE LAP W18XL18IN WHT COT 4 PLY FLD STRUNG RADPQ DISP ST

## (undated) DEVICE — 3M™ STERI-DRAPE™ ISOLATION BAG, 10 PER CARTON / 4 CARTONS PER CASE, 1003: Brand: 3M™ STERI-DRAPE™

## (undated) DEVICE — LOOP VES W25MM THK1MM MAXI RED SIL FLD REPELLENT 100 PER

## (undated) DEVICE — APPLIER CLP L9.375IN APER 2.1MM CLS L3.8MM 20 SM TI CLP

## (undated) DEVICE — TAPE MED W1/8XL30IN WHT POLY

## (undated) DEVICE — 3M™ IOBAN™ 2 ANTIMICROBIAL INCISE DRAPE 6650EZ: Brand: IOBAN™ 2

## (undated) DEVICE — TOWEL,OR,DSP,ST,BLUE,STD,4/PK,20PK/CS: Brand: MEDLINE

## (undated) DEVICE — SYRINGE MED 10ML LUERLOCK TIP W/O SFTY DISP

## (undated) DEVICE — SOLUTION IV 1000ML 0.9% SOD CHL PH 5 INJ USP VIAFLX PLAS